# Patient Record
Sex: MALE | Race: WHITE | NOT HISPANIC OR LATINO
[De-identification: names, ages, dates, MRNs, and addresses within clinical notes are randomized per-mention and may not be internally consistent; named-entity substitution may affect disease eponyms.]

---

## 2017-01-25 ENCOUNTER — MEDICATION RENEWAL (OUTPATIENT)
Age: 82
End: 2017-01-25

## 2017-02-15 ENCOUNTER — LABORATORY RESULT (OUTPATIENT)
Age: 82
End: 2017-02-15

## 2017-02-22 ENCOUNTER — APPOINTMENT (OUTPATIENT)
Dept: ENDOCRINOLOGY | Facility: CLINIC | Age: 82
End: 2017-02-22

## 2017-02-22 VITALS
DIASTOLIC BLOOD PRESSURE: 82 MMHG | SYSTOLIC BLOOD PRESSURE: 150 MMHG | HEART RATE: 72 BPM | HEIGHT: 60 IN | BODY MASS INDEX: 30.43 KG/M2 | OXYGEN SATURATION: 95 % | WEIGHT: 155 LBS

## 2017-02-22 RX ORDER — DENOSUMAB 60 MG/ML
60 INJECTION SUBCUTANEOUS
Qty: 1 | Refills: 0 | Status: COMPLETED | OUTPATIENT
Start: 2017-02-22

## 2017-02-22 RX ADMIN — DENOSUMAB 0 MG/ML: 60 INJECTION SUBCUTANEOUS at 00:00

## 2017-08-30 ENCOUNTER — APPOINTMENT (OUTPATIENT)
Dept: ENDOCRINOLOGY | Facility: CLINIC | Age: 82
End: 2017-08-30

## 2017-08-30 ENCOUNTER — APPOINTMENT (OUTPATIENT)
Dept: ENDOCRINOLOGY | Facility: CLINIC | Age: 82
End: 2017-08-30
Payer: COMMERCIAL

## 2017-08-30 VITALS
SYSTOLIC BLOOD PRESSURE: 142 MMHG | DIASTOLIC BLOOD PRESSURE: 64 MMHG | BODY MASS INDEX: 30.63 KG/M2 | HEART RATE: 73 BPM | OXYGEN SATURATION: 93 % | WEIGHT: 156 LBS | HEIGHT: 60 IN

## 2017-08-30 PROCEDURE — 99214 OFFICE O/P EST MOD 30 MIN: CPT | Mod: 25

## 2017-08-30 PROCEDURE — 96401 CHEMO ANTI-NEOPL SQ/IM: CPT

## 2017-08-30 RX ORDER — DENOSUMAB 60 MG/ML
60 INJECTION SUBCUTANEOUS
Qty: 1 | Refills: 0 | Status: COMPLETED | OUTPATIENT
Start: 2017-08-30

## 2017-08-30 RX ORDER — AMLODIPINE BESYLATE AND BENAZEPRIL HYDROCHLORIDE 5; 10 MG/1; MG/1
5-10 CAPSULE ORAL
Refills: 0 | Status: ACTIVE | COMMUNITY

## 2017-08-30 RX ADMIN — DENOSUMAB 0 MG/ML: 60 INJECTION SUBCUTANEOUS at 00:00

## 2017-09-06 ENCOUNTER — APPOINTMENT (OUTPATIENT)
Dept: ENDOCRINOLOGY | Facility: CLINIC | Age: 82
End: 2017-09-06
Payer: COMMERCIAL

## 2017-09-06 VITALS — BODY MASS INDEX: 24.91 KG/M2 | HEIGHT: 66 IN | WEIGHT: 155 LBS

## 2017-09-06 PROCEDURE — 77080 DXA BONE DENSITY AXIAL: CPT

## 2018-03-05 ENCOUNTER — APPOINTMENT (OUTPATIENT)
Dept: ENDOCRINOLOGY | Facility: CLINIC | Age: 83
End: 2018-03-05

## 2018-04-16 ENCOUNTER — INPATIENT (INPATIENT)
Facility: HOSPITAL | Age: 83
LOS: 3 days | Discharge: HOME | DRG: 156 | End: 2018-04-20
Attending: INTERNAL MEDICINE | Admitting: INTERNAL MEDICINE
Payer: COMMERCIAL

## 2018-04-16 VITALS
HEART RATE: 86 BPM | RESPIRATION RATE: 17 BRPM | DIASTOLIC BLOOD PRESSURE: 88 MMHG | TEMPERATURE: 98 F | OXYGEN SATURATION: 98 % | HEIGHT: 68 IN | SYSTOLIC BLOOD PRESSURE: 158 MMHG | WEIGHT: 154.98 LBS

## 2018-04-16 DIAGNOSIS — K12.2 CELLULITIS AND ABSCESS OF MOUTH: ICD-10-CM

## 2018-04-16 LAB
ALBUMIN SERPL ELPH-MCNC: 3.6 G/DL — SIGNIFICANT CHANGE UP (ref 3.3–5)
ALP SERPL-CCNC: 60 U/L — SIGNIFICANT CHANGE UP (ref 40–120)
ALT FLD-CCNC: 8 U/L RC — LOW (ref 10–45)
ANION GAP SERPL CALC-SCNC: 15 MMOL/L — SIGNIFICANT CHANGE UP (ref 5–17)
APTT BLD: 28 SEC — SIGNIFICANT CHANGE UP (ref 27.5–37.4)
AST SERPL-CCNC: 11 U/L — SIGNIFICANT CHANGE UP (ref 10–40)
BASOPHILS # BLD AUTO: 0 K/UL — SIGNIFICANT CHANGE UP (ref 0–0.2)
BASOPHILS NFR BLD AUTO: 0.1 % — SIGNIFICANT CHANGE UP (ref 0–2)
BILIRUB SERPL-MCNC: 0.6 MG/DL — SIGNIFICANT CHANGE UP (ref 0.2–1.2)
BUN SERPL-MCNC: 18 MG/DL — SIGNIFICANT CHANGE UP (ref 7–23)
CALCIUM SERPL-MCNC: 9.4 MG/DL — SIGNIFICANT CHANGE UP (ref 8.4–10.5)
CHLORIDE SERPL-SCNC: 95 MMOL/L — LOW (ref 96–108)
CO2 SERPL-SCNC: 23 MMOL/L — SIGNIFICANT CHANGE UP (ref 22–31)
CREAT SERPL-MCNC: 0.8 MG/DL — SIGNIFICANT CHANGE UP (ref 0.5–1.3)
EOSINOPHIL # BLD AUTO: 0.1 K/UL — SIGNIFICANT CHANGE UP (ref 0–0.5)
EOSINOPHIL NFR BLD AUTO: 0.6 % — SIGNIFICANT CHANGE UP (ref 0–6)
GLUCOSE SERPL-MCNC: 109 MG/DL — HIGH (ref 70–99)
HCT VFR BLD CALC: 32.7 % — LOW (ref 39–50)
HGB BLD-MCNC: 11 G/DL — LOW (ref 13–17)
INR BLD: 1.08 RATIO — SIGNIFICANT CHANGE UP (ref 0.88–1.16)
LYMPHOCYTES # BLD AUTO: 0.8 K/UL — LOW (ref 1–3.3)
LYMPHOCYTES # BLD AUTO: 6.2 % — LOW (ref 13–44)
MCHC RBC-ENTMCNC: 31.3 PG — SIGNIFICANT CHANGE UP (ref 27–34)
MCHC RBC-ENTMCNC: 33.7 GM/DL — SIGNIFICANT CHANGE UP (ref 32–36)
MCV RBC AUTO: 93 FL — SIGNIFICANT CHANGE UP (ref 80–100)
MONOCYTES # BLD AUTO: 1.4 K/UL — HIGH (ref 0–0.9)
MONOCYTES NFR BLD AUTO: 10.5 % — SIGNIFICANT CHANGE UP (ref 2–14)
NEUTROPHILS # BLD AUTO: 11 K/UL — HIGH (ref 1.8–7.4)
NEUTROPHILS NFR BLD AUTO: 82.6 % — HIGH (ref 43–77)
PLATELET # BLD AUTO: 280 K/UL — SIGNIFICANT CHANGE UP (ref 150–400)
POTASSIUM SERPL-MCNC: 4.2 MMOL/L — SIGNIFICANT CHANGE UP (ref 3.5–5.3)
POTASSIUM SERPL-SCNC: 4.2 MMOL/L — SIGNIFICANT CHANGE UP (ref 3.5–5.3)
PROT SERPL-MCNC: 6.5 G/DL — SIGNIFICANT CHANGE UP (ref 6–8.3)
PROTHROM AB SERPL-ACNC: 11.8 SEC — SIGNIFICANT CHANGE UP (ref 9.8–12.7)
RBC # BLD: 3.51 M/UL — LOW (ref 4.2–5.8)
RBC # FLD: 11.2 % — SIGNIFICANT CHANGE UP (ref 10.3–14.5)
SODIUM SERPL-SCNC: 133 MMOL/L — LOW (ref 135–145)
WBC # BLD: 13.4 K/UL — HIGH (ref 3.8–10.5)
WBC # FLD AUTO: 13.4 K/UL — HIGH (ref 3.8–10.5)

## 2018-04-16 PROCEDURE — 99220: CPT

## 2018-04-16 PROCEDURE — 93010 ELECTROCARDIOGRAM REPORT: CPT

## 2018-04-16 RX ORDER — SODIUM CHLORIDE 9 MG/ML
3 INJECTION INTRAMUSCULAR; INTRAVENOUS; SUBCUTANEOUS EVERY 8 HOURS
Qty: 0 | Refills: 0 | Status: DISCONTINUED | OUTPATIENT
Start: 2018-04-16 | End: 2018-04-20

## 2018-04-16 RX ORDER — AMLODIPINE BESYLATE 2.5 MG/1
5 TABLET ORAL DAILY
Qty: 0 | Refills: 0 | Status: DISCONTINUED | OUTPATIENT
Start: 2018-04-16 | End: 2018-04-20

## 2018-04-16 RX ORDER — SODIUM CHLORIDE 9 MG/ML
1000 INJECTION INTRAMUSCULAR; INTRAVENOUS; SUBCUTANEOUS
Qty: 0 | Refills: 0 | Status: DISCONTINUED | OUTPATIENT
Start: 2018-04-16 | End: 2018-04-20

## 2018-04-16 RX ORDER — ATORVASTATIN CALCIUM 80 MG/1
20 TABLET, FILM COATED ORAL AT BEDTIME
Qty: 0 | Refills: 0 | Status: DISCONTINUED | OUTPATIENT
Start: 2018-04-16 | End: 2018-04-20

## 2018-04-16 RX ADMIN — ATORVASTATIN CALCIUM 20 MILLIGRAM(S): 80 TABLET, FILM COATED ORAL at 23:04

## 2018-04-16 RX ADMIN — SODIUM CHLORIDE 100 MILLILITER(S): 9 INJECTION INTRAMUSCULAR; INTRAVENOUS; SUBCUTANEOUS at 23:05

## 2018-04-16 RX ADMIN — AMLODIPINE BESYLATE 5 MILLIGRAM(S): 2.5 TABLET ORAL at 23:05

## 2018-04-16 RX ADMIN — Medication 100 MILLIGRAM(S): at 18:46

## 2018-04-16 NOTE — ED PROVIDER NOTE - PROGRESS NOTE DETAILS
ENT consulted, PAs saw pt, waiting for attending, Dr. Roberson for recs. ENT performed I&D at bedside, recommended clinda, medicine admission, will follow.

## 2018-04-16 NOTE — CONSULT NOTE ADULT - ATTENDING COMMENTS
Thick purulent foul smelling abscess contents drained from right SMG. Sent for culture. Packed with gauze. Pt failed outpt treatment with augmentin so recommend clindamycin. Likely can d/c tomorrow if clinically improved.

## 2018-04-16 NOTE — ED CDU PROVIDER INITIAL DAY NOTE - OBJECTIVE STATEMENT
88y/o M with PMH prostate cancer, HLD, HTN, and osteoporosis p/w L. mandibular swelling x 3 wks. Area is painful, swollen and red. pt was diagnosed with sialodenitis 3 weeks ago, completed course of augmentin about 1.5wks ago. pts symptoms were improving while on the antibiotic, then a week after completing it, symptoms worsened. + subjective fevers, pain with swallowing. pt went to his ENT again today, had flexible laryngoscope which showed clear airway, and was informed to go to the ED for IV antibiotics. denies SOB, dyspnea, inability to swallowing, drooling, N/V, CP, neck pain, problems walking/going to the bathroom.   In ED, ENT saw pt and performed I&D. pt states he feels much better since having the I&D. pt started on clinda. pt sent to CDU for continued IV antibioitics.      PMD: Dr. Geovani Puri  ENT: Dr. Philip Perlman 88y/o M with PMH prostate cancer, HLD, HTN, and osteoporosis p/w L. mandibular swelling x 3 wks. Area is painful, swollen and red. pt was diagnosed with sialodenitis 3 weeks ago, completed course of augmentin about 1.5wks ago. pts symptoms were improving while on the antibiotic, then a week after completing it, symptoms worsened. + subjective fevers, pain with swallowing. pt went to his ENT again today, had flexible laryngoscope which showed clear airway, and was informed to go to the ED for IV antibiotics. denies SOB, dyspnea, inability to swallowing, drooling, N/V, CP, neck pain, problems walking/going to the bathroom.   In ED, CT shows L sialodenitis with abscess, ENT saw pt and performed I&D. pt states he feels much better since having the I&D. pt started on clinda. pt sent to CDU for continued IV antibiotics.      PMD: Dr. Geovani Puri  ENT: Dr. Philip Perlman

## 2018-04-16 NOTE — ED PROVIDER NOTE - PHYSICAL EXAMINATION
Gen: NAD  Eyes:  sclerae white, no icterus  HEENT swelling under L. mandible 2x3cm, indurated, erythematous, tender  Neck: supple, no LAD, mass or goiter, trachea midline  CV: RRR. Audible S1 and S2. No murmurs, rubs, gallops, S3, nor S4  Pulm: Clear to auscultation bilaterally. No wheezes, rales, or rhonchi  Abd: BS+, nondistended, No tenderness to palpation  Psych: mood good, affect full range and congruent with mood.  Neurologic: AAOx3

## 2018-04-16 NOTE — ED CDU PROVIDER INITIAL DAY NOTE - ATTENDING CONTRIBUTION TO CARE
Attending Note (Jamal): patient with rle cellulitis.  erythema, warmth.  recently treated with antibiotics, failed.  labs, antibiotics, cdu. 88 yo male with large L sided neck mass as noted, evaluated by ENT and drained at the bedside, now dispositioned to CDU for observation and further management.  see ED note and ENT consult note from this same visit for further details.

## 2018-04-16 NOTE — CONSULT NOTE ADULT - PROBLEM SELECTOR RECOMMENDATION 9
IV ABX & pain control prn  Sialogogues  Possible I&D of abscess  Dr Roberson to see Medicine Admission for observation IV ABX & pain control prn  Sialogogues, warm compresses  Local wound checks/care per routine  Will follow

## 2018-04-16 NOTE — ED CDU PROVIDER DISPOSITION NOTE - CLINICAL COURSE
88y/o M with PMH prostate cancer, HLD, HTN, and osteoporosis p/w L. mandibular swelling x 3 wks. Area is painful, swollen and red. pt was diagnosed with sialodenitis 3 weeks ago, completed course of augmentin about 1.5wks ago. pts symptoms were improving while on the antibiotic, then a week after completing it, symptoms worsened. + subjective fevers, pain with swallowing. pt went to his ENT again today, had flexible laryngoscope which showed clear airway, and was informed to go to the ED for IV antibiotics.   In ED, CT shows L sialodenitis with abscess, ENT saw pt and performed I&D. pt states he feels much better since having the I&D. pt started on clinda. pt sent to CDU for continued IV antibiotics. 89y/o M with PMH prostate cancer, HLD, HTN, and osteoporosis p/w L. mandibular swelling x 3 wks. Area is painful, swollen and red. pt was diagnosed with sialodenitis 3 weeks ago, completed course of augmentin about 1.5wks ago. pts symptoms were improving while on the antibiotic, then a week after completing it, symptoms worsened. + subjective fevers, pain with swallowing. pt went to his ENT again today, had flexible laryngoscope which showed clear airway, and was informed to go to the ED for IV antibiotics.   In ED, CT shows L sialodenitis with abscess, ENT saw pt and performed I&D. pt states he feels much better since having the I&D. pt started on clinda. pt sent to CDU for continued IV antibiotics. Patient felt well in the morning and was afebrile overnight. ENT came to see the patient in AM and changed the packing to assess and after further observation patient was evaluated by ENT attending Dr. Roberson who recommended medicine admission for continued IV abx given wound continuously draining. Dr. Rudolph was called and accepted the patient under his service. Case discussed with ED attending and patient made aware.

## 2018-04-16 NOTE — ED CDU PROVIDER DISPOSITION NOTE - ATTENDING CONTRIBUTION TO CARE
ED attending Dr Marck Escudero note:  Patient re-evaluated and doing well.  No acute issues at  this time.  Lab and radiology tests reviewed with patient and/or family.  Patient stable for discharge.  I have personally performed a face to face diagnostic evaluation on this patient.  I have reviewed the ACP note and agree with the history, exam, and plan of care, except as noted.  History and Exam by me showed improvement of sts, seen by ENT and repacked abscess for d.c today with abx and ent follow up.

## 2018-04-16 NOTE — ED ADULT NURSE NOTE - OBJECTIVE STATEMENT
1235 89 yr old WM brought to ER by daughter for further eval and tx of increasing redness to left side of face and voice slightly hoarse. s/p "infected salivary gland" 3/28/2018. Was on augmentin at that time. finished 2 wks ago. Evaluated today by ENT and sent to ER for work-up to r/o abscess. Denies fever or chills. A&Ox3. Hx through daughter. Fall risk precautions maintained. Yellow band intact, siderails up, red socks given. wearing diaper. minimal pain at present2/10. erythema noted at left side of face and left ear. No stridor or drooling. Voice mildly hoarse.

## 2018-04-16 NOTE — ED ADULT TRIAGE NOTE - CHIEF COMPLAINT QUOTE
Patient sent by Dr. Perlman (ENT) for "clogged left salivary gland" +swelling and redness x 2 weeks. Patient was on augmentin, minimal improvement.

## 2018-04-16 NOTE — ED CDU PROVIDER INITIAL DAY NOTE - FAMILY HISTORY
Sibling  Still living? Unknown  Family history of breast cancer in male, Age at diagnosis: Age Unknown  Family history of prostate cancer, Age at diagnosis: Age Unknown

## 2018-04-16 NOTE — CONSULT NOTE ADULT - SUBJECTIVE AND OBJECTIVE BOX
CC: Left mandibular swelling    HPI: Patient is a 89y old male with HTN, HLD, hx prostate ca, sent in by Dr. Perlman (ENT) for worsening left submandibular swelling. Pt with increasing pain, subjective fevers, after course of abx for suspecting sialoadenitis. Out pt FOE/laryngoscopy done out pt and revealed patent airway.    PAST MEDICAL & SURGICAL HISTORY:  HLD (hyperlipidemia)  Hypertension  History of Prostate Cancer  H/O arthroplasty: Left shoulder.  S/P Prostatectomy  S/P Appendectomy    Allergies    No Known Allergies    Intolerances      MEDICATIONS  (STANDING):    MEDICATIONS  (PRN):    Social History: ????????    ROS: ENT, GI, , CV, Pulm, Neuro, Psych, MS, Heme, Endo, Constitutional; all negative except as noted in HPI    Vital Signs Last 24 Hrs  T(C): 37.1 (16 Apr 2018 15:03), Max: 37.1 (16 Apr 2018 15:03)  T(F): 98.7 (16 Apr 2018 15:03), Max: 98.7 (16 Apr 2018 15:03)  HR: 96 (16 Apr 2018 15:03) (82 - 96)  BP: 155/55 (16 Apr 2018 15:03) (147/80 - 158/88)  BP(mean): --  RR: 19 (16 Apr 2018 15:03) (17 - 20)  SpO2: 98% (16 Apr 2018 15:03) (98% - 99%)                          11.0   13.4  )-----------( 280      ( 16 Apr 2018 14:00 )             32.7    04-16    133<L>  |  95<L>  |  18  ----------------------------<  109<H>  4.2   |  23  |  0.80    Ca    9.4      16 Apr 2018 14:00    TPro  6.5  /  Alb  3.6  /  TBili  0.6  /  DBili  x   /  AST  11  /  ALT  8<L>  /  AlkPhos  60  04-16   PT/INR - ( 16 Apr 2018 14:00 )   PT: 11.8 sec;   INR: 1.08 ratio         PTT - ( 16 Apr 2018 14:00 )  PTT:28.0 sec    PHYSICAL EXAM:  Gen: NAD, well-developed  Head: Normocephalic, Atraumatic  Face: no edema/erythema/fluctuance, parotid glands soft without mass  Eyes: PERRL, EOMI, no scleral injection  Ears: Right - ear canal clear, TM intact without effusion            Left - ear canal clear, TM intact without effusion  Nose: Nares bilaterally patent, no discharge  Mouth: Mucosa moist, tongue/uvula midline, oropharynx clear  Neck: Flat, supple, no lymphadenopathy, trachea midline, no masses  Resp: no use of accessory muscles, no stridor  CV: no peripheral edema/cyanosis CC: Left mandibular swelling x 2 weeks        HPI: Patient is a 89y old male with HTN, HLD, hx prostate ca, sent in by Dr. Perlman ( Primary ENT) for progressive Left submandibular swelling, and pain a/w difficulty swallowing, pt reports discomfort while sleeping increasing Left neck pain. Pt was being treated with Augmentin PO, with no resolution of symptoms. Pt seen & evaluated  by Dr Perlman this AM, advised to come to ED for further evaluation Imaging ( Neck CT to r/o Abscess), and IV ABX     Laryngoscopy by Dr Perlman today within normal limits, no airway edema or involvement. Pt denies dysphagia/f/c/voice change/drooling/recent travel/weight loss/neck pain/      EXAM: CT NECK SOFT TISSUE IC     PROCEDURE DATE: 04/16/2018     INTERPRETATION: Contrast-enhanced CT of the neck     CLINICAL INDICATION: Left neck swelling. Pain.     TECHNIQUE: Axial CT scanning of the neck was obtained after the intravenous   administration of 90 cc of Omnipaque 300 (10 cc were discarded) from skull   base to the clavicles. Sagittal and coronal reformats were provided.     COMPARISON: None available     FINDINGS:     There is a 3.4 x 3.2 x 2.9 cm (anterior-posterior by transverse by   craniocaudad) rim-enhancing fluid collection involving the inferior aspect   of the left submandibular gland, consistent with abscess. Thin internal   septations are present. Left submandibular sialoliths are noted. There is   infiltration of the surrounding soft tissues.     Right submandibular gland, bilateral parotid glands, and visualized thyroid   gland demonstrate no focal lesion.     Soft tissues surrounding the nasopharynx, oropharynx, hypopharynx, and   larynx are unremarkable.     No lymphadenopathy.     Visualized lung apices clear. No lytic or destructive osseous lesion.     Moderate compression deformity of T3 which appears chronic. Milder   compression deformity of other     IMPRESSION:     Findings consistent with left submandibular gland sialadenitis complicated   by the presence of a 3.4 x 3.2 x 2.9 cm left submandibular abscess.     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 Clinical   correlation recommended.         PAST MEDICAL & SURGICAL HISTORY:  HLD (hyperlipidemia)  Hypertension  History of Prostate Cancer  H/O arthroplasty: Left shoulder.  S/P Prostatectomy  S/P Appendectomy    Allergies    No Known Allergies    Intolerances      MEDICATIONS  (STANDING):    MEDICATIONS  (PRN):    Social History: denies ETOH/Tobacco/Recreational Drug use    ROS: ENT, GI, , CV, Pulm, Neuro, Psych, MS, Heme, Endo, Constitutional; all negative except as noted in HPI    Vital Signs Last 24 Hrs  T(C): 37.1 (16 Apr 2018 15:03), Max: 37.1 (16 Apr 2018 15:03)  T(F): 98.7 (16 Apr 2018 15:03), Max: 98.7 (16 Apr 2018 15:03)  HR: 96 (16 Apr 2018 15:03) (82 - 96)  BP: 155/55 (16 Apr 2018 15:03) (147/80 - 158/88)  RR: 19 (16 Apr 2018 15:03) (17 - 20)  SpO2: 98% (16 Apr 2018 15:03) (98% - 99%)                          11.0   13.4  )-----------( 280      ( 16 Apr 2018 14:00 )             32.7    04-16    133<L>  |  95<L>  |  18  ----------------------------<  109<H>  4.2   |  23  |  0.80    Ca    9.4      16 Apr 2018 14:00    TPro  6.5  /  Alb  3.6  /  TBili  0.6  /  DBili  x   /  AST  11  /  ALT  8<L>  /  AlkPhos  60  04-16   PT/INR - ( 16 Apr 2018 14:00 )   PT: 11.8 sec;   INR: 1.08 ratio         PTT - ( 16 Apr 2018 14:00 )  PTT:28.0 sec    PHYSICAL EXAM:  Gen: NAD, well-developed speaking full sentences no stridor  Head: Normocephalic, Atraumatic  Face: no edema/erythema/fluctuance, parotid glands soft without mass   Eyes: no scleral injection  Ears: Right - ear canal clear, TM intact without effusion            Left - ear canal clear, TM intact without effusion  Nose: Nares bilaterally patent, no discharge  Mouth: Mucosa moist, tongue midline FOM soft uvula midline, oropharynx clear symmterical tonsillar fossa no pooling of secretions  Neck:  +Left Submandibular swelling with overlying erythema, induration, and fluctuance, +TTP no submental edema, neck landmarks visible otherwise neck Flat, supple, no lymphadenopathy, trachea midline, no masses  Resp: no use of accessory muscles, no stridor    Laryngoscopy Scope 4  Nasopharynx, oropharynx, and hypopharynx clear, no bleeding. Airway patent, no foreign body visualized. No erythema, edema, pooling of secretions, masses or lesions. No glottic/supraglottic edema. Vocal cords mobile with good contact b/l. bilateral nasal cavities were thoroughly inspected. The scope was advanced on the floor of the nose to the nasopharynx, it was fully inspected. It was then passed between the middle and the inferior turbinates, followed by exam of the olfactory cleft to look for any polyps. The patient was seen to have no bilateral turbinate hypertrophy and no nasal septal deviation. Examination of the middle and superior meatus, sphenoethmoid recess were seen to be normal.  No bleeding in Oral pharynx.  No foreign body or pooling of secretions.  No glottic / supraglottic swelling.  Vocal cords mobile in intact b/l.  Airway patent. CC: Left mandibular swelling x 2 weeks        HPI: Patient is a 89y old male with HTN, HLD, hx prostate ca, sent in by Dr. Perlman ( Primary ENT) for progressive Left submandibular swelling, and pain a/w difficulty swallowing, pt reports discomfort while sleeping increasing Left neck pain. Pt was being treated with Augmentin PO, with no resolution of symptoms. Pt seen & evaluated  by Dr Perlman this AM, advised to come to ED for further evaluation Imaging ( Neck CT to r/o Abscess), and IV ABX     Laryngoscopy by Dr Perlman today within normal limits, no airway edema or involvement. Pt denies dysphagia/f/c/voice change/drooling/recent travel/weight loss/neck pain/      EXAM: CT NECK SOFT TISSUE IC     PROCEDURE DATE: 04/16/2018     INTERPRETATION: Contrast-enhanced CT of the neck     CLINICAL INDICATION: Left neck swelling. Pain.     TECHNIQUE: Axial CT scanning of the neck was obtained after the intravenous   administration of 90 cc of Omnipaque 300 (10 cc were discarded) from skull   base to the clavicles. Sagittal and coronal reformats were provided.     COMPARISON: None available     FINDINGS:     There is a 3.4 x 3.2 x 2.9 cm (anterior-posterior by transverse by   craniocaudad) rim-enhancing fluid collection involving the inferior aspect   of the left submandibular gland, consistent with abscess. Thin internal   septations are present. Left submandibular sialoliths are noted. There is   infiltration of the surrounding soft tissues.     Right submandibular gland, bilateral parotid glands, and visualized thyroid   gland demonstrate no focal lesion.     Soft tissues surrounding the nasopharynx, oropharynx, hypopharynx, and   larynx are unremarkable.     No lymphadenopathy.     Visualized lung apices clear. No lytic or destructive osseous lesion.     Moderate compression deformity of T3 which appears chronic. Milder   compression deformity of other     IMPRESSION:     Findings consistent with left submandibular gland sialadenitis complicated   by the presence of a 3.4 x 3.2 x 2.9 cm left submandibular abscess.     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 Clinical   correlation recommended.         PAST MEDICAL & SURGICAL HISTORY:  HLD (hyperlipidemia)  Hypertension  History of Prostate Cancer  H/O arthroplasty: Left shoulder.  S/P Prostatectomy  S/P Appendectomy    Allergies    No Known Allergies    Intolerances      MEDICATIONS  (STANDING):    MEDICATIONS  (PRN):    Social History: denies ETOH/Tobacco/Recreational Drug use    ROS: ENT, GI, , CV, Pulm, Neuro, Psych, MS, Heme, Endo, Constitutional; all negative except as noted in HPI    Vital Signs Last 24 Hrs  T(C): 37.1 (16 Apr 2018 15:03), Max: 37.1 (16 Apr 2018 15:03)  T(F): 98.7 (16 Apr 2018 15:03), Max: 98.7 (16 Apr 2018 15:03)  HR: 96 (16 Apr 2018 15:03) (82 - 96)  BP: 155/55 (16 Apr 2018 15:03) (147/80 - 158/88)  RR: 19 (16 Apr 2018 15:03) (17 - 20)  SpO2: 98% (16 Apr 2018 15:03) (98% - 99%)                          11.0   13.4  )-----------( 280      ( 16 Apr 2018 14:00 )             32.7    04-16    133<L>  |  95<L>  |  18  ----------------------------<  109<H>  4.2   |  23  |  0.80    Ca    9.4      16 Apr 2018 14:00    TPro  6.5  /  Alb  3.6  /  TBili  0.6  /  DBili  x   /  AST  11  /  ALT  8<L>  /  AlkPhos  60  04-16   PT/INR - ( 16 Apr 2018 14:00 )   PT: 11.8 sec;   INR: 1.08 ratio         PTT - ( 16 Apr 2018 14:00 )  PTT:28.0 sec    PHYSICAL EXAM:  Gen: NAD, well-developed speaking full sentences no stridor  Head: Normocephalic, Atraumatic  Face: no edema/erythema/fluctuance, parotid glands soft without mass   Eyes: no scleral injection  Ears: Right - ear canal clear, TM intact without effusion            Left - ear canal clear, TM intact without effusion  Nose: Nares bilaterally patent, no discharge  Mouth: Mucosa moist, tongue midline FOM soft uvula midline, oropharynx clear symmterical tonsillar fossa no pooling of secretions  Neck:  +Left Submandibular swelling with overlying erythema, induration, and fluctuance, +TTP no submental edema, neck landmarks visible otherwise neck Flat, supple, no lymphadenopathy, trachea midline, no masses  Resp: no use of accessory muscles, no stridor    Laryngoscopy Scope 4  Nasopharynx, oropharynx, and hypopharynx clear, no bleeding. Airway patent, no foreign body visualized. No erythema, edema, pooling of secretions, masses or lesions. No glottic/supraglottic edema. Vocal cords mobile with good contact b/l. bilateral nasal cavities were thoroughly inspected. The scope was advanced on the floor of the nose to the nasopharynx, it was fully inspected. It was then passed between the middle and the inferior turbinates, followed by exam of the olfactory cleft to look for any polyps. The patient was seen to have no bilateral turbinate hypertrophy and no nasal septal deviation. Examination of the middle and superior meatus, sphenoethmoid recess were seen to be normal.  No bleeding in Oral pharynx.  No foreign body or pooling of secretions.  No glottic / supraglottic swelling.  Vocal cords mobile in intact b/l.  Airway patent.        PROCEDURE NOTE - Incision and Drainage of  Left Submandibular Abscess    Indication: Left Submandibular Abscess  	  Consent obtained, correct patient, procedure, site, positioning, and presence of correct supplies.    Procedure: Patient was positioned, prepped and draped in usual sterile fashion. Approximately 5cc's of 1% lidocaine w/ Epi was used to anesthetize the Left Submandibular region #11 blade scalpel was used to make a 1.5 cm incision across the body of the abscess. Clamp inserted and maneuvered to break up  any loculations Manual pressure was used to remove the contents of the abscess cavity. Cultures of the drainage were sent. Following evacuation of the abscess cavity, it was rinsed thoroughly with sterile normal saline. The abscess cavity was packed with 1/2 inch iodoform pressure dressing as applied to ensure hemostasis.  Blood loss: Minimal  Complications: None CC: Left mandibular swelling x 2 weeks        HPI: Patient is a 89y old male with HTN, HLD, hx prostate ca, sent in by Dr. Perlman ( Primary ENT) for progressive Left submandibular swelling, and pain a/w difficulty swallowing, pt reports discomfort while sleeping increasing Left neck pain. Pt was being treated with Augmentin PO, with no resolution of symptoms. Pt seen & evaluated  by Dr Perlman this AM, advised to come to ED for further evaluation Imaging ( Neck CT to r/o Abscess), and IV ABX     Laryngoscopy by Dr Perlman today within normal limits, no airway edema or involvement. Pt denies dysphagia/f/c/voice change/drooling/recent travel/weight loss/neck pain/      EXAM: CT NECK SOFT TISSUE IC     PROCEDURE DATE: 04/16/2018     INTERPRETATION: Contrast-enhanced CT of the neck     CLINICAL INDICATION: Left neck swelling. Pain.     TECHNIQUE: Axial CT scanning of the neck was obtained after the intravenous   administration of 90 cc of Omnipaque 300 (10 cc were discarded) from skull   base to the clavicles. Sagittal and coronal reformats were provided.     COMPARISON: None available     FINDINGS:     There is a 3.4 x 3.2 x 2.9 cm (anterior-posterior by transverse by   craniocaudad) rim-enhancing fluid collection involving the inferior aspect   of the left submandibular gland, consistent with abscess. Thin internal   septations are present. Left submandibular sialoliths are noted. There is   infiltration of the surrounding soft tissues.     Right submandibular gland, bilateral parotid glands, and visualized thyroid   gland demonstrate no focal lesion.     Soft tissues surrounding the nasopharynx, oropharynx, hypopharynx, and   larynx are unremarkable.     No lymphadenopathy.     Visualized lung apices clear. No lytic or destructive osseous lesion.     Moderate compression deformity of T3 which appears chronic. Milder   compression deformity of other     IMPRESSION:     Findings consistent with left submandibular gland sialadenitis complicated   by the presence of a 3.4 x 3.2 x 2.9 cm left submandibular abscess.     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 Clinical   correlation recommended.         PAST MEDICAL & SURGICAL HISTORY:  HLD (hyperlipidemia)  Hypertension  History of Prostate Cancer  H/O arthroplasty: Left shoulder.  S/P Prostatectomy  S/P Appendectomy    Allergies    No Known Allergies    Intolerances      MEDICATIONS  (STANDING):    MEDICATIONS  (PRN):    Social History: denies ETOH/Tobacco/Recreational Drug use    ROS: ENT, GI, , CV, Pulm, Neuro, Psych, MS, Heme, Endo, Constitutional; all negative except as noted in HPI    Vital Signs Last 24 Hrs  T(C): 37.1 (16 Apr 2018 15:03), Max: 37.1 (16 Apr 2018 15:03)  T(F): 98.7 (16 Apr 2018 15:03), Max: 98.7 (16 Apr 2018 15:03)  HR: 96 (16 Apr 2018 15:03) (82 - 96)  BP: 155/55 (16 Apr 2018 15:03) (147/80 - 158/88)  RR: 19 (16 Apr 2018 15:03) (17 - 20)  SpO2: 98% (16 Apr 2018 15:03) (98% - 99%)                          11.0   13.4  )-----------( 280      ( 16 Apr 2018 14:00 )             32.7    04-16    133<L>  |  95<L>  |  18  ----------------------------<  109<H>  4.2   |  23  |  0.80    Ca    9.4      16 Apr 2018 14:00    TPro  6.5  /  Alb  3.6  /  TBili  0.6  /  DBili  x   /  AST  11  /  ALT  8<L>  /  AlkPhos  60  04-16   PT/INR - ( 16 Apr 2018 14:00 )   PT: 11.8 sec;   INR: 1.08 ratio         PTT - ( 16 Apr 2018 14:00 )  PTT:28.0 sec    PHYSICAL EXAM:  Gen: NAD, well-developed speaking full sentences no stridor  Head: Normocephalic, Atraumatic  Face: no edema/erythema/fluctuance, parotid glands soft without mass   Eyes: no scleral injection  Ears: Right - ear canal clear, TM intact without effusion            Left - ear canal clear, TM intact without effusion  Nose: Nares bilaterally patent, no discharge  Mouth: Mucosa moist, tongue midline FOM soft uvula midline, oropharynx clear symmterical tonsillar fossa no pooling of secretions  Neck:  +Left Submandibular swelling with overlying erythema, induration, and fluctuance, +TTP no submental edema, neck landmarks visible otherwise neck Flat, supple, no lymphadenopathy, trachea midline, no masses  Resp: no use of accessory muscles, no stridor    Laryngoscopy Scope 4  Nasopharynx, oropharynx, and hypopharynx clear, no bleeding. Airway patent, no foreign body visualized. No erythema, edema, pooling of secretions, masses or lesions. Vocal cords mobile with good contact b/l.   No foreign body or pooling of secretions.  No glottic / supraglottic swelling.  Airway patent.        PROCEDURE NOTE - Incision and Drainage of  Left Submandibular Abscess    Indication: Left Submandibular Abscess  	  Consent obtained, correct patient, procedure, site, positioning, and presence of correct supplies.    Procedure: Patient was positioned, prepped and draped in usual sterile fashion. Approximately 5cc's of 1% lidocaine w/ Epi was used to anesthetize the Left Submandibular region #11 blade scalpel was used to make a 1.5 cm incision across the body of the abscess. Clamp inserted and maneuvered to break up  any loculations Manual pressure was used to remove the contents of the abscess cavity. Cultures of the drainage were sent. Following evacuation of the abscess cavity, it was rinsed thoroughly with sterile normal saline. The abscess cavity was packed with 1/2 inch iodoform pressure dressing as applied to ensure hemostasis.  Blood loss: Minimal  Complications: None

## 2018-04-16 NOTE — ED CDU PROVIDER DISPOSITION NOTE - PLAN OF CARE
1. Take Clindamycin 300mg every 6 hours for 1 week. Stay hydrated.  2.  Keep covered and dry.  Return to ED in 1-2 days for wound recheck and packing change.   3. Any increased pain, redness, fever, chills return to ED.  4. Follow up with your PMD within 1 week.

## 2018-04-16 NOTE — ED PROVIDER NOTE - MEDICAL DECISION MAKING DETAILS
Attending Note (Jamal): patient with rle cellulitis.  erythema, warmth.  recently treated with antibiotics, failed.  labs, antibiotics, cdu. Attending Note (Jamal): patient with neck swelling, sent in by ent.  unclear etiology.  ct neck, ent consult.

## 2018-04-16 NOTE — CONSULT NOTE ADULT - ASSESSMENT
89yoM with progressive Left Submandibular Swelling and pain, with no improvement on Oral ABX,  Neck CT reveals a 3.4 x 3.2 x 2.9 cm (anterior-posterior by transverse by   craniocaudad) rim-enhancing fluid collection involving the inferior aspect of the left submandibular gland, consistent with abscess.  Laryngoscopy reveals no airway involvement 89yoM with progressive Left Submandibular Swelling and pain, with no improvement on Oral ABX,  Neck CT reveals a 3.4 x 3.2 x 2.9 cm (anterior-posterior by transverse by craniocaudad) rim-enhancing fluid collection involving the inferior aspect of the left submandibular gland, consistent with abscess.  Laryngoscopy reveals no airway involvement Pt is s/p Bedside Incision and Drainage of Abscess Cultures obtained

## 2018-04-16 NOTE — ED PROVIDER NOTE - OBJECTIVE STATEMENT
Hx HTN, hyperchol, p/w L. mandibular swelling, worsening x 3 wks, painful, sent in by ENT - flexible laryngoscope showed clear airway, initially thought to be sialdenitis, completed course of augmentin. + subjective fever. Hx HTN, hyperchol, p/w L. mandibular swelling, worsening x 3 wks, painful, sent in by ENT - flexible laryngoscope showed clear airway, initially thought to be sialdenitis, completed course of augmentin. + subjective fever.  primary care physician: Geovani Puri  ENT: Philip Perlman

## 2018-04-17 DIAGNOSIS — K12.2 CELLULITIS AND ABSCESS OF MOUTH: ICD-10-CM

## 2018-04-17 LAB
ANION GAP SERPL CALC-SCNC: 14 MMOL/L — SIGNIFICANT CHANGE UP (ref 5–17)
BASOPHILS # BLD AUTO: 0 K/UL — SIGNIFICANT CHANGE UP (ref 0–0.2)
BASOPHILS NFR BLD AUTO: 0.3 % — SIGNIFICANT CHANGE UP (ref 0–2)
BUN SERPL-MCNC: 14 MG/DL — SIGNIFICANT CHANGE UP (ref 7–23)
CALCIUM SERPL-MCNC: 8.7 MG/DL — SIGNIFICANT CHANGE UP (ref 8.4–10.5)
CHLORIDE SERPL-SCNC: 95 MMOL/L — LOW (ref 96–108)
CO2 SERPL-SCNC: 22 MMOL/L — SIGNIFICANT CHANGE UP (ref 22–31)
CREAT SERPL-MCNC: 0.79 MG/DL — SIGNIFICANT CHANGE UP (ref 0.5–1.3)
EOSINOPHIL # BLD AUTO: 0.1 K/UL — SIGNIFICANT CHANGE UP (ref 0–0.5)
EOSINOPHIL NFR BLD AUTO: 1 % — SIGNIFICANT CHANGE UP (ref 0–6)
GLUCOSE SERPL-MCNC: 108 MG/DL — HIGH (ref 70–99)
HCT VFR BLD CALC: 30.2 % — LOW (ref 39–50)
HGB BLD-MCNC: 10.2 G/DL — LOW (ref 13–17)
LYMPHOCYTES # BLD AUTO: 1 K/UL — SIGNIFICANT CHANGE UP (ref 1–3.3)
LYMPHOCYTES # BLD AUTO: 7.7 % — LOW (ref 13–44)
MCHC RBC-ENTMCNC: 30.8 PG — SIGNIFICANT CHANGE UP (ref 27–34)
MCHC RBC-ENTMCNC: 33.8 GM/DL — SIGNIFICANT CHANGE UP (ref 32–36)
MCV RBC AUTO: 91.1 FL — SIGNIFICANT CHANGE UP (ref 80–100)
MONOCYTES # BLD AUTO: 1.6 K/UL — HIGH (ref 0–0.9)
MONOCYTES NFR BLD AUTO: 13.2 % — SIGNIFICANT CHANGE UP (ref 2–14)
NEUTROPHILS # BLD AUTO: 9.7 K/UL — HIGH (ref 1.8–7.4)
NEUTROPHILS NFR BLD AUTO: 77.9 % — HIGH (ref 43–77)
PLATELET # BLD AUTO: 279 K/UL — SIGNIFICANT CHANGE UP (ref 150–400)
POTASSIUM SERPL-MCNC: 4.1 MMOL/L — SIGNIFICANT CHANGE UP (ref 3.5–5.3)
POTASSIUM SERPL-SCNC: 4.1 MMOL/L — SIGNIFICANT CHANGE UP (ref 3.5–5.3)
RBC # BLD: 3.32 M/UL — LOW (ref 4.2–5.8)
RBC # FLD: 11.2 % — SIGNIFICANT CHANGE UP (ref 10.3–14.5)
SODIUM SERPL-SCNC: 131 MMOL/L — LOW (ref 135–145)
WBC # BLD: 12.5 K/UL — HIGH (ref 3.8–10.5)
WBC # FLD AUTO: 12.5 K/UL — HIGH (ref 3.8–10.5)

## 2018-04-17 PROCEDURE — 99024 POSTOP FOLLOW-UP VISIT: CPT

## 2018-04-17 PROCEDURE — 99217: CPT

## 2018-04-17 RX ORDER — VANCOMYCIN HCL 1 G
1000 VIAL (EA) INTRAVENOUS EVERY 24 HOURS
Qty: 0 | Refills: 0 | Status: DISCONTINUED | OUTPATIENT
Start: 2018-04-17 | End: 2018-04-20

## 2018-04-17 RX ADMIN — Medication 100 MILLIGRAM(S): at 21:07

## 2018-04-17 RX ADMIN — SODIUM CHLORIDE 3 MILLILITER(S): 9 INJECTION INTRAMUSCULAR; INTRAVENOUS; SUBCUTANEOUS at 21:05

## 2018-04-17 RX ADMIN — Medication 250 MILLIGRAM(S): at 18:14

## 2018-04-17 RX ADMIN — SODIUM CHLORIDE 3 MILLILITER(S): 9 INJECTION INTRAMUSCULAR; INTRAVENOUS; SUBCUTANEOUS at 16:39

## 2018-04-17 RX ADMIN — ATORVASTATIN CALCIUM 20 MILLIGRAM(S): 80 TABLET, FILM COATED ORAL at 21:07

## 2018-04-17 RX ADMIN — SODIUM CHLORIDE 100 MILLILITER(S): 9 INJECTION INTRAMUSCULAR; INTRAVENOUS; SUBCUTANEOUS at 18:14

## 2018-04-17 RX ADMIN — Medication 100 MILLIGRAM(S): at 10:56

## 2018-04-17 RX ADMIN — SODIUM CHLORIDE 3 MILLILITER(S): 9 INJECTION INTRAMUSCULAR; INTRAVENOUS; SUBCUTANEOUS at 07:01

## 2018-04-17 RX ADMIN — Medication 100 MILLIGRAM(S): at 03:07

## 2018-04-17 NOTE — H&P ADULT - HISTORY OF PRESENT ILLNESS
88y/o M with PMH prostate cancer, HLD, HTN, and osteoporosis p/w L. mandibular swelling x 3 wks. Area is painful, swollen and red. pt was diagnosed with sialodenitis 3 weeks ago, completed course of augmentin about 1.5wks ago. pts symptoms were improving while on the antibiotic, then a week after completing it, symptoms worsened. + subjective fevers, pain with swallowing. pt went to his ENT again today, had flexible laryngoscope which showed clear airway, and was informed to go to the ED for IV antibiotics. denies SOB, dyspnea, inability to swallowing, drooling, N/V, CP, neck pain, problems walking/going to the bathroom.   	In ED, CT shows L sialodenitis with abscess, ENT saw pt and performed I&D. pt states he feels much better since having the I&D. pt started on 89y/o M with PMH prostate cancer, HLD, HTN, and osteoporosis p/w L. mandibular swelling x 3 wks. Area is painful, swollen and red. pt was diagnosed with sialodenitis 3 weeks ago, completed course of augmentin about 1.5wks ago. pts symptoms were improving while on the antibiotic, then a week after completing it, symptoms worsened. + subjective fevers, pain with swallowing. pt went to his ENT again today, had flexible laryngoscope which showed clear airway, and was informed to go to the ED for IV antibiotics. denies SOB, dyspnea, inability to swallowing, drooling, N/V, CP, neck pain, problems walking/going to the bathroom.   	In ED, CT shows L sialodenitis with abscess, ENT saw pt and performed I&D. pt states he feels much better since having the I&D. pt started on clinda

## 2018-04-17 NOTE — H&P ADULT - ASSESSMENT
89 yo male h/o prstate ca, htn, chol, a/w left submandibular cellulitis with abscess  s/p I&D  wound care  ent f/u  f/u cult  abx as per id  pain control  hydration 88 yo male h/o prstate ca, htn, chol, a/w left submandibular cellulitis with abscess    s/p I&D by ENT  wound care  ent f/u  f/u cult  abx as per id  pain control  hydration    cont home meds    dvt ppx

## 2018-04-17 NOTE — CONSULT NOTE ADULT - SUBJECTIVE AND OBJECTIVE BOX
Patient is a 89y old  Male who presents with a chief complaint of     HPI:    Patient is a 89y old male with HTN, HLD, hx prostate ca, sent in by Dr. Perlman ( Primary ENT) for progressive Left submandibular swelling, and pain a/w difficulty swallowing, pt reports discomfort while sleeping increasing Left neck pain. Pt was being treated with Augmentin PO, with no resolution of symptoms. Pt seen & evaluated  by Dr Perlman this AM, advised to come to ED for further evaluation Imaging ( Neck CT to r/o Abscess), and IV ABX     Laryngoscopy by Dr Perlman today within normal limits, no airway edema or involvement. Pt denies dysphagia/f/c/voice change/drooling/recent travel/weight loss/neck pain/      REVIEW OF SYSTEMS:    CONSTITUTIONAL: No fever, weight loss, or fatigue  EYES: No eye pain, visual disturbances, or discharge  ENMT:  No sore throat  NECK: No pain or stiffness  RESPIRATORY: No cough, wheezing, chills or hemoptysis; No shortness of breath  CARDIOVASCULAR: No chest pain, palpitations, dizziness, or leg swelling  GASTROINTESTINAL: No abdominal or epigastric pain. No nausea, vomiting, or hematemesis; No diarrhea or constipation. No melena or hematochezia.  GENITOURINARY: No dysuria, frequency, hematuria, or incontinence  NEUROLOGICAL: No headaches, memory loss, loss of strength, numbness, or tremors  SKIN: No itching, burning, rashes, or lesions   LYMPH NODES: No enlarged glands  MUSCULOSKELETAL: No joint pain or swelling; No muscle, back, or extremity pain      PAST MEDICAL & SURGICAL HISTORY:  Osteoporosis  HLD (hyperlipidemia)  Hypertension  History of Prostate Cancer  H/O arthroplasty: Left shoulder.  S/P Prostatectomy  S/P Appendectomy      Allergies    No Known Allergies    Intolerances        FAMILY HISTORY:  Family history of prostate cancer (Sibling)  Family history of breast cancer in male (Sibling)      SOCIAL HISTORY:        MEDICATIONS  (STANDING):  amLODIPine   Tablet 5 milliGRAM(s) Oral daily  atorvastatin 20 milliGRAM(s) Oral at bedtime  clindamycin IVPB 600 milliGRAM(s) IV Intermittent every 8 hours  sodium chloride 0.9% lock flush 3 milliLiter(s) IV Push every 8 hours  sodium chloride 0.9%. 1000 milliLiter(s) (100 mL/Hr) IV Continuous <Continuous>    MEDICATIONS  (PRN):      Vital Signs Last 24 Hrs  T(C): 37 (17 Apr 2018 12:14), Max: 37.5 (17 Apr 2018 03:09)  T(F): 98.6 (17 Apr 2018 12:14), Max: 99.5 (17 Apr 2018 03:09)  HR: 81 (17 Apr 2018 12:14) (81 - 93)  BP: 110/68 (17 Apr 2018 12:14) (110/68 - 171/89)  BP(mean): --  RR: 18 (17 Apr 2018 12:14) (16 - 19)  SpO2: 97% (17 Apr 2018 12:14) (96% - 98%)    PHYSICAL EXAM:    GENERAL: NAD, well-groomed  HEAD:  Atraumatic, Normocephalic  EYES: EOMI, PERRLA, conjunctiva and sclera clear  ENMT: No tonsillar erythema, exudates, or enlargement; Moist mucous membranes  NECK: Supple, No JVD  CHEST/LUNG: Clear to percussion bilaterally; No rales, rhonchi, wheezing, or rubs  HEART: Regular rate and rhythm; No murmurs, rubs, or gallops  ABDOMEN: Soft, Nontender, Nondistended; Bowel sounds present  EXTREMITIES:  2+ Peripheral Pulses, No clubbing, cyanosis, or edema  LYMPH: No lymphadenopathy noted  SKIN: No rashes or lesions    LABS:  CBC Full  -  ( 17 Apr 2018 06:49 )  WBC Count : 12.5 K/uL  Hemoglobin : 10.2 g/dL  Hematocrit : 30.2 %  Platelet Count - Automated : 279 K/uL  Mean Cell Volume : 91.1 fl  Mean Cell Hemoglobin : 30.8 pg  Mean Cell Hemoglobin Concentration : 33.8 gm/dL  Auto Neutrophil # : 9.7 K/uL  Auto Lymphocyte # : 1.0 K/uL  Auto Monocyte # : 1.6 K/uL  Auto Eosinophil # : 0.1 K/uL  Auto Basophil # : 0.0 K/uL  Auto Neutrophil % : 77.9 %  Auto Lymphocyte % : 7.7 %  Auto Monocyte % : 13.2 %  Auto Eosinophil % : 1.0 %  Auto Basophil % : 0.3 %      04-17    131<L>  |  95<L>  |  14  ----------------------------<  108<H>  4.1   |  22  |  0.79    Ca    8.7      17 Apr 2018 06:49    TPro  6.5  /  Alb  3.6  /  TBili  0.6  /  DBili  x   /  AST  11  /  ALT  8<L>  /  AlkPhos  60  04-16      LIVER FUNCTIONS - ( 16 Apr 2018 14:00 )  Alb: 3.6 g/dL / Pro: 6.5 g/dL / ALK PHOS: 60 U/L / ALT: 8 U/L RC / AST: 11 U/L / GGT: x                 MICROBIOLOGY:          RADIOLOGY:      < from: CT Neck Soft Tissue w/ IV Cont (04.16.18 @ 15:44) >    IMPRESSION:    Findings consistent with left submandibular gland sialadenitis   complicated by the presence of a 3.4 x 3.2 x 2.9 cm left submandibular   abscess.    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 Clinical   correlation recommended.    < end of copied text >

## 2018-04-17 NOTE — H&P ADULT - NSHPREVIEWOFSYSTEMS_GEN_ALL_CORE
Constitutional: No fatigue or weight loss.  Skin: No rash.  Eyes: No recent vision problems or eye pain.  ENT: No congestion, ear pain, or sore throat.  Endocrine: No thyroid problems.  Cardiovascular: No chest pain or palpation.  Respiratory: No cough, shortness of breath, congestion, or wheezing.  Gastrointestinal: No abdominal pain, nausea, vomiting, or diarrhea.  Genitourinary: No dysuria.  Musculoskeletal: No joint swelling.  Neurologic: No headache.

## 2018-04-17 NOTE — ED ADULT NURSE REASSESSMENT NOTE - NS ED NURSE REASSESS COMMENT FT1
ENT at bedside, dtr present
awaiting CDU PA eval
dtr present, transfer to cdu pending PA eval, without c/o
report taken from Juanita GREEN. pt antibiotic due at 11:00 am
resting in bed, dtr present, awaiting CT neck/soft tissue, vitals satble, clear lungs, skin wdi, denies pain/pruritis, L jaw firm to touch with associated redness, L ear redness, non tender
returns from CT
Pt received from PETER Ward. Pt oriented to CDU & plan of care was discussed. Pt denies any pain at the moment. Pt L mandible is swollen. Unable to visualize wound due to packing & tape. Dressy dry & intact. Pt endorses some difficulty swallowing. Pt on continuous pulse ox maintaining O2 sat >95%. Safety & comfort measures maintained. Call bell in reach. Will continue to monitor.

## 2018-04-17 NOTE — CHART NOTE - NSCHARTNOTEFT_GEN_A_CORE
88 yo male with left sialoadenitiis complicated by submandibular abscess s/p Incision and Drainage in ED yesterday. Dressing change at bedside tonight performed with saline flush into wound and massaging of wound edges to evacuate drainage.  There was a smaller amount of serosanginous with pus drainage as compared to the mornings dressing change.. Wound was packed with iodoform guaze and dressing was applied. Pt. tolerated preocedure. Pt. to continue with IV antibiotics (Clinda) and daily dressing changes. ENT will continue to follow.

## 2018-04-17 NOTE — PROGRESS NOTE ADULT - SUBJECTIVE AND OBJECTIVE BOX
POD/STATUS POST/ENT ISSUE: L submandibular swelling and pain    INTERVAL HPI: 89 yo male seen and examined in CDU after overnight stay for IV abx s/p incision and drainage of left submandibular abscess in ED last night. WBC 12.5, afebrile.    Vital Signs Last 24 Hrs  T(C): 37.2 (17 Apr 2018 07:56), Max: 37.5 (17 Apr 2018 03:09)  T(F): 98.9 (17 Apr 2018 07:56), Max: 99.5 (17 Apr 2018 03:09)  HR: 81 (17 Apr 2018 07:56) (81 - 96)  BP: 124/73 (17 Apr 2018 07:56) (116/72 - 171/89)  BP(mean): --  RR: 18 (17 Apr 2018 07:56) (16 - 20)  SpO2: 98% (17 Apr 2018 07:56) (96% - 99%)    PHYSICAL EXAM:  Gen: NAD, well-developed  Head: Normocephalic, Atraumatic  Eyes: PERRL, EOMI, no scleral injection  Nose: Nares bilaterally patent, no discharge  Mouth: Mucosa moist, tongue/uvula midline, oropharynx clear  Neck: Flat, supple, no lymphadenopathy, trachea midline, no masses  Resp: breathing easily, no stridor  CV: no peripheral edema/cyanosis    LABS:                        10.2   12.5  )-----------( 279      ( 17 Apr 2018 06:49 )             30.2 POD/STATUS POST/ENT ISSUE: L submandibular swelling and pain    INTERVAL HPI: 88 yo male seen and examined in CDU after overnight stay for IV abx s/p incision and drainage of left submandibular abscess in ED last night. WBC 12.5, afebrile. Pt eating breakfast without difficulty, breathing comfortably on room air.    Vital Signs Last 24 Hrs  T(C): 37.2 (17 Apr 2018 07:56), Max: 37.5 (17 Apr 2018 03:09)  T(F): 98.9 (17 Apr 2018 07:56), Max: 99.5 (17 Apr 2018 03:09)  HR: 81 (17 Apr 2018 07:56) (81 - 96)  BP: 124/73 (17 Apr 2018 07:56) (116/72 - 171/89)  BP(mean): --  RR: 18 (17 Apr 2018 07:56) (16 - 20)  SpO2: 98% (17 Apr 2018 07:56) (96% - 99%)    PHYSICAL EXAM:  Gen: NAD, well-developed  Head: Normocephalic, Atraumatic  Eyes: PERRL, EOMI, no scleral injection  Nose: Nares bilaterally patent, no discharge  Mouth: Mucosa moist, tongue/uvula midline, oropharynx clear  Neck: Flat, supple, no lymphadenopathy, trachea midline, left submandibular abscess draining serosanguinous drainage, 4x4 gauze saturated, iodofrom packing removed and repacked, new gauze pressure dressing placed  Resp: breathing easily, no stridor  CV: no peripheral edema/cyanosis    LABS:                        10.2   12.5  )-----------( 279      ( 17 Apr 2018 06:49 )             30.2

## 2018-04-17 NOTE — CONSULT NOTE ADULT - ASSESSMENT
Patient is a 89y old male with HTN, HLD, hx prostate ca, sent in by Dr. Perlman ( Primary ENT) for progressive Left submandibular swelling, and pain a/w difficulty swallowing, pt reports discomfort while sleeping increasing Left neck pain. Pt was being treated with Augmentin PO, with no resolution of symptoms. Pt seen & evaluated  by Dr Perlman this AM, advised to come to ED for further evaluation Imaging ( Neck CT to r/o Abscess), and IV ABX     Laryngoscopy by Dr Perlman today within normal limits, no airway edema or involvement. Pt denies dysphagia/f/c/voice change/drooling/recent travel/weight loss/neck pain/    Problem/Plan - 1:    Submandibular abscess    - s/p I&D and cultures sent    - Agree with continuing clindamycin for gram positive and anerobic coverage.  Will add vancomycin to cover for MRSA.    - Cont wound care, packing in place.  ENT following    d/w patient and daughter at bedside      Will follow,    Shanika Espinoza  [phone]

## 2018-04-17 NOTE — ED CDU PROVIDER SUBSEQUENT DAY NOTE - SKIN, MLM
Left mandibular wound as above in HEAD. Skin normal color for race, warm, dry and intact. No evidence of rash.

## 2018-04-17 NOTE — PATIENT PROFILE ADULT. - ABILITY TO HEAR (WITH HEARING AID OR HEARING APPLIANCE IF NORMALLY USED):
Mildly to Moderately Impaired: difficulty hearing in some environments or speaker may need to increase volume or speak distinctly/bilateral [place]

## 2018-04-17 NOTE — PROGRESS NOTE ADULT - ASSESSMENT
88 yo male  s/p incision and drainage of left submandibular abscess in ED last night, received 2 doses IV abx. WBC 12.5, trending down, afebrile. 89 yo male  s/p incision and drainage of left submandibular abscess in ED last night, received 2 doses IV abx. WBC 12.5, trending down, afebrile. Draining left submandibular abscess iodoform packing changed, saturated gauze replaced with new pressure dressing.

## 2018-04-17 NOTE — H&P ADULT - NSHPPHYSICALEXAM_GEN_ALL_CORE
Vital Signs Last 24 Hrs  T(C): 36.8 (17 Apr 2018 17:56), Max: 37.5 (17 Apr 2018 03:09)  T(F): 98.2 (17 Apr 2018 17:56), Max: 99.5 (17 Apr 2018 03:09)  HR: 77 (17 Apr 2018 17:56) (77 - 93)  BP: 110/73 (17 Apr 2018 17:56) (110/68 - 159/82)  BP(mean): --  RR: 18 (17 Apr 2018 17:56) (16 - 18)  SpO2: 97% (17 Apr 2018 17:56) (96% - 98%)    PHYSICAL EXAM:  GENERAL: NAD, well-developed  HEAD:  Atraumatic, Normocephalic.  left mandibular dressing in place  EYES: EOMI, PERRLA, conjunctiva and sclera clear  NECK: Supple, No JVD  CHEST/LUNG: Clear to auscultation bilaterally; No wheeze  HEART: Regular rate and rhythm; No murmurs, rubs, or gallops  ABDOMEN: Soft, Nontender, Nondistended; Bowel sounds present  EXTREMITIES:  2+ Peripheral Pulses, No clubbing, cyanosis, or edema  PSYCH: AAOx3  NEUROLOGY: non-focal  SKIN: No rashes or lesions

## 2018-04-17 NOTE — ED CDU PROVIDER SUBSEQUENT DAY NOTE - PROGRESS NOTE DETAILS
Patient seen at bedside in NAD.  VSS.  Patient resting comfortably without complaints. Will continue to monitor. - Boni Morrow PA-C Patient seen at bedside in NAD.  VSS.  Patient resting comfortably without complaints. Very hard of hearing at baseline. Awaiting daughter to bring back hearing aids. Pt feels well and states improved since yesterday. Packing in place. No fever/chills overnight and has had improvement of pain. Will continue to monitor. - Boni Morrow PA-C Patient seen at bedside in NAD.  VSS.  Patient resting comfortably without complaints. Continues to report improvement this AM. Feels swelling is still there but improving. No fever/chills, dysphagia, headache, trismus. ENT saw pt in AM and changed packing and will round on patient again in afternoon w/ attending. Will await final recs. Continue clindamycin. - Boni Morrow PA-C ENT attending re-evaluated pt at bedside and decided given pt's wound still draining a lot in setting of age and co-morbidities will need admission for additional days of IV abx and wound care. Called PMD Dr. Sanjay Puri and informed him of this and he instructed to admit to whoever is on call for unattached. ED attending made aware. Will page unattached med. - Boni Morrow PA-C Spoke with Dr. Rudolph regarding admission. Informed him of pt's presentation, ENT evaluations thus far and ENTs recs for admission for continued IV abx and wound care as inpatient. He accepted pt under his service. Admission orders in. - Boni Morrow PA-C

## 2018-04-17 NOTE — PROGRESS NOTE ADULT - PROBLEM SELECTOR PLAN 1
Iodoform packing changed, pressure dressing changed  Continue with medications  Dr. Roberson to see pt Iodoform packing changed, pressure dressing changed  Continue with medications  Pending left submandibular abscess culture  Dr. Roberson to see pt

## 2018-04-17 NOTE — PATIENT PROFILE ADULT. - SURGICAL SITE INCISION
"FLASHES / FLOATERS / POSTERIOR VITREOUS DETACHMENT    Call the clinic if you have any further changes in symptoms.  Including:  Increased numbers of floaters or flashing lights, dimness or darkness that moves through or stays constant in your vision, or any pain in the eye (s).            DRY EYES:  Use Over The Counter artificial tears as needed for dry eye symptoms.  Some common brands include:  Systane, Optive, and Refresh.  These drops can be used as frequently as desired, but may be most helpful use during long periods of concentrated work.  For example, reading / working at the computer.  Avoid drops that "get redness out", as these contain medication that may further irritate the eyes.    ALLERGY EYES / SYMPTOMS:    Over the counter medications include--Zaditor and Alaway  Use as directed 1-2 drops daily for symptoms of itching / watering eyes.  These drops will not help for dry eye or exposure symptoms.      " no

## 2018-04-18 LAB
ANION GAP SERPL CALC-SCNC: 11 MMOL/L — SIGNIFICANT CHANGE UP (ref 5–17)
BUN SERPL-MCNC: 16 MG/DL — SIGNIFICANT CHANGE UP (ref 7–23)
CALCIUM SERPL-MCNC: 9 MG/DL — SIGNIFICANT CHANGE UP (ref 8.4–10.5)
CHLORIDE SERPL-SCNC: 97 MMOL/L — SIGNIFICANT CHANGE UP (ref 96–108)
CO2 SERPL-SCNC: 25 MMOL/L — SIGNIFICANT CHANGE UP (ref 22–31)
CREAT SERPL-MCNC: 0.85 MG/DL — SIGNIFICANT CHANGE UP (ref 0.5–1.3)
CULTURE RESULTS: SIGNIFICANT CHANGE UP
GLUCOSE SERPL-MCNC: 106 MG/DL — HIGH (ref 70–99)
HCT VFR BLD CALC: 29.4 % — LOW (ref 39–50)
HGB BLD-MCNC: 10.2 G/DL — LOW (ref 13–17)
MCHC RBC-ENTMCNC: 31.9 PG — SIGNIFICANT CHANGE UP (ref 27–34)
MCHC RBC-ENTMCNC: 34.6 GM/DL — SIGNIFICANT CHANGE UP (ref 32–36)
MCV RBC AUTO: 92.1 FL — SIGNIFICANT CHANGE UP (ref 80–100)
PLATELET # BLD AUTO: 259 K/UL — SIGNIFICANT CHANGE UP (ref 150–400)
POTASSIUM SERPL-MCNC: 4.1 MMOL/L — SIGNIFICANT CHANGE UP (ref 3.5–5.3)
POTASSIUM SERPL-SCNC: 4.1 MMOL/L — SIGNIFICANT CHANGE UP (ref 3.5–5.3)
RBC # BLD: 3.19 M/UL — LOW (ref 4.2–5.8)
RBC # FLD: 11.1 % — SIGNIFICANT CHANGE UP (ref 10.3–14.5)
SODIUM SERPL-SCNC: 133 MMOL/L — LOW (ref 135–145)
SPECIMEN SOURCE: SIGNIFICANT CHANGE UP
WBC # BLD: 7.5 K/UL — SIGNIFICANT CHANGE UP (ref 3.8–10.5)
WBC # FLD AUTO: 7.5 K/UL — SIGNIFICANT CHANGE UP (ref 3.8–10.5)

## 2018-04-18 PROCEDURE — 99024 POSTOP FOLLOW-UP VISIT: CPT

## 2018-04-18 RX ADMIN — AMLODIPINE BESYLATE 5 MILLIGRAM(S): 2.5 TABLET ORAL at 05:03

## 2018-04-18 RX ADMIN — SODIUM CHLORIDE 3 MILLILITER(S): 9 INJECTION INTRAMUSCULAR; INTRAVENOUS; SUBCUTANEOUS at 21:14

## 2018-04-18 RX ADMIN — Medication 100 MILLIGRAM(S): at 05:03

## 2018-04-18 RX ADMIN — Medication 100 MILLIGRAM(S): at 21:14

## 2018-04-18 RX ADMIN — Medication 100 MILLIGRAM(S): at 13:13

## 2018-04-18 RX ADMIN — SODIUM CHLORIDE 3 MILLILITER(S): 9 INJECTION INTRAMUSCULAR; INTRAVENOUS; SUBCUTANEOUS at 11:34

## 2018-04-18 RX ADMIN — ATORVASTATIN CALCIUM 20 MILLIGRAM(S): 80 TABLET, FILM COATED ORAL at 21:14

## 2018-04-18 RX ADMIN — Medication 250 MILLIGRAM(S): at 17:53

## 2018-04-18 RX ADMIN — SODIUM CHLORIDE 3 MILLILITER(S): 9 INJECTION INTRAMUSCULAR; INTRAVENOUS; SUBCUTANEOUS at 05:03

## 2018-04-18 NOTE — PROGRESS NOTE ADULT - PROBLEM SELECTOR PLAN 1
Iodoform packing changed, pressure dressing changed  Continue with medications  Pending left submandibular abscess culture

## 2018-04-18 NOTE — PROGRESS NOTE ADULT - SUBJECTIVE AND OBJECTIVE BOX
POD/STATUS POST/ENT ISSUE: L submandibular swelling and pain      INTERVAL HPI: 89 yo male seen and examined at bedside after admitted for IV abx s/p incision and drainage of left submandibular abscess in ED on 4/16. WBC 12.5, afebrile. Pt eating breakfast without difficulty, breathing comfortably on room air. Pt denies sore throat, SOB, hoarseness, fevers      Vital Signs Last 24 Hrs  T(C): 36.6 (18 Apr 2018 04:08), Max: 37.1 (17 Apr 2018 16:55)  T(F): 97.9 (18 Apr 2018 04:08), Max: 98.7 (17 Apr 2018 16:55)  HR: 68 (18 Apr 2018 04:08) (68 - 81)  BP: 105/70 (18 Apr 2018 04:08) (105/70 - 133/86)  BP(mean): --  RR: 18 (18 Apr 2018 04:08) (18 - 18)  SpO2: 96% (18 Apr 2018 04:08) (96% - 97%)    PHYSICAL EXAM:  Gen: NAD, well-developed  Head: Normocephalic, Atraumatic  Eyes: PERRL, EOMI, no scleral injection  Nose: Nares bilaterally patent, no discharge  Mouth: Mucosa moist, tongue/uvula midline, oropharynx clear  Neck: minimal serosanguinous drainage from left submandibular wound, mildly tender to palpation, DRSG and iodoform packing in place, trachea midline, no masses  Resp:  no stridor  CV: no peripheral edema/cyanosis    LABS:                        10.2   12.5  )-----------( 279      ( 17 Apr 2018 06:49 )             30.2 POD/STATUS POST/ENT ISSUE: L submandibular swelling and pain      INTERVAL HPI: 88 yo male seen and examined at bedside after admitted for IV abx s/p incision and drainage of left submandibular abscess in ED on 4/16. WBC 12.5, afebrile. Pt admits to feeling much better today. Pt eating breakfast without difficulty, breathing comfortably on room air. Pt denies sore throat, SOB, hoarseness, fevers      Vital Signs Last 24 Hrs  T(C): 36.6 (18 Apr 2018 04:08), Max: 37.1 (17 Apr 2018 16:55)  T(F): 97.9 (18 Apr 2018 04:08), Max: 98.7 (17 Apr 2018 16:55)  HR: 68 (18 Apr 2018 04:08) (68 - 81)  BP: 105/70 (18 Apr 2018 04:08) (105/70 - 133/86)  BP(mean): --  RR: 18 (18 Apr 2018 04:08) (18 - 18)  SpO2: 96% (18 Apr 2018 04:08) (96% - 97%)    PHYSICAL EXAM:  Gen: NAD, well-developed  Head: Normocephalic, Atraumatic  Eyes: PERRL, EOMI, no scleral injection  Nose: Nares bilaterally patent, no discharge  Mouth: Mucosa moist, tongue/uvula midline, oropharynx clear  Neck: minimal serosanguinous drainage from left submandibular wound, mildly tender to palpation, DRSG and wound washed and  iodoform packing changed, trachea midline, no masses  Resp:  no stridor  CV: no peripheral edema/cyanosis    LABS:                        10.2   12.5  )-----------( 279      ( 17 Apr 2018 06:49 )             30.2

## 2018-04-18 NOTE — PROGRESS NOTE ADULT - ASSESSMENT
Patient is a 89y old male with HTN, HLD, hx prostate ca, sent in by Dr. Perlman ( Primary ENT) for progressive Left submandibular swelling, and pain a/w difficulty swallowing, pt reports discomfort while sleeping increasing Left neck pain. Pt was being treated with Augmentin PO, with no resolution of symptoms. Pt seen & evaluated  by Dr Perlman this AM, advised to come to ED for further evaluation Imaging ( Neck CT to r/o Abscess), and IV ABX     Laryngoscopy by Dr Perlman today within normal limits, no airway edema or involvement. Pt denies dysphagia/f/c/voice change/drooling/recent travel/weight loss/neck pain/    Problem/Plan - 1:    Submandibular abscess    - s/p I&D and cultures sent - growing alpha strep species.  Awaiting final cultures results/sensitivities.      - Agree with continuing clindamycin for gram positive and anerobic coverage.  Will add vancomycin to cover for MRSA pending final results    - Cont wound care, packing in place.  ENT following    - Cont iv abx for now - pt still with significant induration    d/w patient and daughter at bedside      Will follow,    Shanika Espinoza  [phone]

## 2018-04-18 NOTE — PROGRESS NOTE ADULT - ASSESSMENT
89 yo male  s/p incision and drainage of left submandibular abscess in ED last night. WBC 12.5, trending down, afebrile. Draining left submandibular abscess iodoform packing changed, saturated gauze replaced with new pressure dressing.

## 2018-04-18 NOTE — PROGRESS NOTE ADULT - SUBJECTIVE AND OBJECTIVE BOX
Infectious Diseases progress note:    Subjective: Feels better.  Rt submandibular area still indurated/swollen.  Packing in place.  No new complaints.  WBC trending down.  Daughter at bedside.    ROS:  CONSTITUTIONAL:  No fever, chills, rigors  CARDIOVASCULAR:  No chest pain or palpitations  RESPIRATORY:   No SOB, cough, dyspnea on exertion.  No wheezing  GASTROINTESTINAL:  No abd pain, N/V, diarrhea/constipation  EXTREMITIES:  No swelling or joint pain  GENITOURINARY:  No burning on urination, increased frequency or urgency.  No flank pain  NEUROLOGIC:  No HA, visual disturbances  SKIN: No rashes    Allergies    No Known Allergies    Intolerances        ANTIBIOTICS/RELEVANT:  antimicrobials  clindamycin IVPB 600 milliGRAM(s) IV Intermittent every 8 hours  vancomycin  IVPB 1000 milliGRAM(s) IV Intermittent every 24 hours    immunologic:    OTHER:  amLODIPine   Tablet 5 milliGRAM(s) Oral daily  atorvastatin 20 milliGRAM(s) Oral at bedtime  sodium chloride 0.9% lock flush 3 milliLiter(s) IV Push every 8 hours  sodium chloride 0.9%. 1000 milliLiter(s) IV Continuous <Continuous>      Objective:  Vital Signs Last 24 Hrs  T(C): 36.8 (18 Apr 2018 12:11), Max: 36.8 (18 Apr 2018 12:11)  T(F): 98.3 (18 Apr 2018 12:11), Max: 98.3 (18 Apr 2018 12:11)  HR: 70 (18 Apr 2018 12:11) (68 - 70)  BP: 110/62 (18 Apr 2018 12:11) (105/70 - 110/62)  BP(mean): --  RR: 18 (18 Apr 2018 12:11) (18 - 18)  SpO2: 97% (18 Apr 2018 12:11) (96% - 97%)    PHYSICAL EXAM:  Constitutional:NAD  Eyes:MIRTA, EOMI  Ear/Nose/Throat: no thrush, mucositis.  Moist mucous membranes	  Neck:no JVD, no lymphadenopathy, supple  Respiratory: CTA regan  Cardiovascular: S1S2 RRR, no murmurs  Gastrointestinal:soft, nontender,  nondistended (+) BS  Extremities:no e/e/c  Skin:  rt submandibular area with packing in place.  Erythematous/indurated        LABS:                        10.2   7.5   )-----------( 259      ( 18 Apr 2018 10:24 )             29.4     04-18    133<L>  |  97  |  16  ----------------------------<  106<H>  4.1   |  25  |  0.85    Ca    9.0      18 Apr 2018 10:14              MICROBIOLOGY:    Culture - Other (04.16.18 @ 20:43)    Specimen Source: Skin Submandibular abscess, left    Culture Results:   Moderate Alpha hemolytic strep    Culture - Blood (04.16.18 @ 16:56)    Specimen Source: .Blood Blood-Peripheral    Culture Results:   No growth to date.    Culture - Blood (04.16.18 @ 16:56)    Specimen Source: .Blood Blood-Peripheral    Culture Results:   No growth to date.          RADIOLOGY & ADDITIONAL STUDIES:

## 2018-04-18 NOTE — PROGRESS NOTE ADULT - ASSESSMENT
89 yo male h/o prstate ca, htn, chol, a/w left submandibular cellulitis with abscess    s/p I&D by ENT  wound care  ent f/u  f/u cult and sens  abx as per id  pain control  hydration    cont home meds    dvt ppx

## 2018-04-18 NOTE — PROGRESS NOTE ADULT - SUBJECTIVE AND OBJECTIVE BOX
MANOLO WALDRON  89y Male  MRN:[id number]    Patient is a 89y old  Male who presents with a chief complaint of Cellulitis and abscess of mouth (17 Apr 2018 18:43)    HPI:  88y/o M with PMH prostate cancer, HLD, HTN, and osteoporosis p/w L. mandibular swelling x 3 wks. Area is painful, swollen and red. pt was diagnosed with sialodenitis 3 weeks ago, completed course of augmentin about 1.5wks ago. pts symptoms were improving while on the antibiotic, then a week after completing it, symptoms worsened. + subjective fevers, pain with swallowing. pt went to his ENT again today, had flexible laryngoscope which showed clear airway, and was informed to go to the ED for IV antibiotics. denies SOB, dyspnea, inability to swallowing, drooling, N/V, CP, neck pain, problems walking/going to the bathroom.   	In ED, CT shows L sialodenitis with abscess, ENT saw pt and performed I&D. pt states he feels much better since having the I&D. pt started on clinda (17 Apr 2018 17:14)      Patient seen and evaluated at bedside. No acute events overnight except as noted.    Interval HPI: no acute events     PAST MEDICAL & SURGICAL HISTORY:  Osteoporosis  HLD (hyperlipidemia)  Hypertension  History of Prostate Cancer  H/O arthroplasty: Left shoulder.  S/P Prostatectomy  S/P Appendectomy      REVIEW OF SYSTEMS:  as per hpi    VITALS:  Vital Signs Last 24 Hrs  T(C): 36.6 (18 Apr 2018 04:08), Max: 36.8 (17 Apr 2018 17:56)  T(F): 97.9 (18 Apr 2018 04:08), Max: 98.2 (17 Apr 2018 17:56)  HR: 68 (18 Apr 2018 04:08) (68 - 77)  BP: 105/70 (18 Apr 2018 04:08) (105/70 - 110/73)  BP(mean): --  RR: 18 (18 Apr 2018 04:08) (18 - 18)  SpO2: 96% (18 Apr 2018 04:08) (96% - 97%)  CAPILLARY BLOOD GLUCOSE        I&O's Summary    17 Apr 2018 07:01  -  18 Apr 2018 07:00  --------------------------------------------------------  IN: 470 mL / OUT: 650 mL / NET: -180 mL    18 Apr 2018 07:01  -  18 Apr 2018 17:53  --------------------------------------------------------  IN: 860 mL / OUT: 761 mL / NET: 99 mL        PHYSICAL EXAM:  GENERAL: NAD, well-developed  HEAD:  Atraumatic, Normocephalic, left mandibular dressing in place  EYES: EOMI, PERRLA, conjunctiva and sclera clear  NECK: Supple, No JVD  CHEST/LUNG: Clear to auscultation bilaterally; No wheeze  HEART: S1, S2; No murmurs, rubs, or gallops  ABDOMEN: Soft, Nontender, Nondistended; Bowel sounds present  EXTREMITIES:  2+ Peripheral Pulses, No clubbing, cyanosis, or edema  PSYCH: Normal affect  NEUROLOGY: AAOX3; non-focal  SKIN: No rashes or lesions    Consultant(s) Notes Reviewed:  [x ] YES  [ ] NO  Care Discussed with Consultants/Other Providers [ x] YES  [ ] NO    MEDS:  MEDICATIONS  (STANDING):  amLODIPine   Tablet 5 milliGRAM(s) Oral daily  atorvastatin 20 milliGRAM(s) Oral at bedtime  clindamycin IVPB 600 milliGRAM(s) IV Intermittent every 8 hours  sodium chloride 0.9% lock flush 3 milliLiter(s) IV Push every 8 hours  sodium chloride 0.9%. 1000 milliLiter(s) (100 mL/Hr) IV Continuous <Continuous>  vancomycin  IVPB 1000 milliGRAM(s) IV Intermittent every 24 hours    MEDICATIONS  (PRN):    ALLERGIES:  No Known Allergies      LABS:                        10.2   7.5   )-----------( 259      ( 18 Apr 2018 10:24 )             29.4     04-18    133<L>  |  97  |  16  ----------------------------<  106<H>  4.1   |  25  |  0.85    Ca    9.0      18 Apr 2018 10:14                TSH:   A1c:  BNP:  Lipid panel:   cultures: Culture Results:   Few Alpha hemolytic strep (04-16 @ 20:43)  Culture Results:   No growth to date. (04-16 @ 16:56)  Culture Results:   No growth to date. (04-16 @ 16:56)      RADIOLOGY & ADDITIONAL TESTS:    Imaging Personally Reviewed:  [ ] YES  [ ] NO

## 2018-04-19 ENCOUNTER — TRANSCRIPTION ENCOUNTER (OUTPATIENT)
Age: 83
End: 2018-04-19

## 2018-04-19 LAB
ANION GAP SERPL CALC-SCNC: 11 MMOL/L — SIGNIFICANT CHANGE UP (ref 5–17)
BUN SERPL-MCNC: 17 MG/DL — SIGNIFICANT CHANGE UP (ref 7–23)
CALCIUM SERPL-MCNC: 8.6 MG/DL — SIGNIFICANT CHANGE UP (ref 8.4–10.5)
CHLORIDE SERPL-SCNC: 99 MMOL/L — SIGNIFICANT CHANGE UP (ref 96–108)
CO2 SERPL-SCNC: 22 MMOL/L — SIGNIFICANT CHANGE UP (ref 22–31)
CREAT SERPL-MCNC: 0.88 MG/DL — SIGNIFICANT CHANGE UP (ref 0.5–1.3)
GLUCOSE SERPL-MCNC: 100 MG/DL — HIGH (ref 70–99)
HCT VFR BLD CALC: 28.1 % — LOW (ref 39–50)
HGB BLD-MCNC: 9.5 G/DL — LOW (ref 13–17)
MCHC RBC-ENTMCNC: 30 PG — SIGNIFICANT CHANGE UP (ref 27–34)
MCHC RBC-ENTMCNC: 33.8 GM/DL — SIGNIFICANT CHANGE UP (ref 32–36)
MCV RBC AUTO: 88.6 FL — SIGNIFICANT CHANGE UP (ref 80–100)
PLATELET # BLD AUTO: 270 K/UL — SIGNIFICANT CHANGE UP (ref 150–400)
POTASSIUM SERPL-MCNC: 4.2 MMOL/L — SIGNIFICANT CHANGE UP (ref 3.5–5.3)
POTASSIUM SERPL-SCNC: 4.2 MMOL/L — SIGNIFICANT CHANGE UP (ref 3.5–5.3)
RBC # BLD: 3.17 M/UL — LOW (ref 4.2–5.8)
RBC # FLD: 12.6 % — SIGNIFICANT CHANGE UP (ref 10.3–14.5)
SODIUM SERPL-SCNC: 132 MMOL/L — LOW (ref 135–145)
VANCOMYCIN TROUGH SERPL-MCNC: 4.8 UG/ML — LOW (ref 10–20)
WBC # BLD: 8.69 K/UL — SIGNIFICANT CHANGE UP (ref 3.8–10.5)
WBC # FLD AUTO: 8.69 K/UL — SIGNIFICANT CHANGE UP (ref 3.8–10.5)

## 2018-04-19 PROCEDURE — 99024 POSTOP FOLLOW-UP VISIT: CPT

## 2018-04-19 RX ADMIN — SODIUM CHLORIDE 100 MILLILITER(S): 9 INJECTION INTRAMUSCULAR; INTRAVENOUS; SUBCUTANEOUS at 14:12

## 2018-04-19 RX ADMIN — Medication 250 MILLIGRAM(S): at 18:14

## 2018-04-19 RX ADMIN — SODIUM CHLORIDE 100 MILLILITER(S): 9 INJECTION INTRAMUSCULAR; INTRAVENOUS; SUBCUTANEOUS at 06:10

## 2018-04-19 RX ADMIN — SODIUM CHLORIDE 100 MILLILITER(S): 9 INJECTION INTRAMUSCULAR; INTRAVENOUS; SUBCUTANEOUS at 23:31

## 2018-04-19 RX ADMIN — ATORVASTATIN CALCIUM 20 MILLIGRAM(S): 80 TABLET, FILM COATED ORAL at 23:31

## 2018-04-19 RX ADMIN — SODIUM CHLORIDE 3 MILLILITER(S): 9 INJECTION INTRAMUSCULAR; INTRAVENOUS; SUBCUTANEOUS at 14:13

## 2018-04-19 RX ADMIN — Medication 100 MILLIGRAM(S): at 05:08

## 2018-04-19 RX ADMIN — SODIUM CHLORIDE 3 MILLILITER(S): 9 INJECTION INTRAMUSCULAR; INTRAVENOUS; SUBCUTANEOUS at 23:06

## 2018-04-19 RX ADMIN — AMLODIPINE BESYLATE 5 MILLIGRAM(S): 2.5 TABLET ORAL at 05:08

## 2018-04-19 RX ADMIN — SODIUM CHLORIDE 3 MILLILITER(S): 9 INJECTION INTRAMUSCULAR; INTRAVENOUS; SUBCUTANEOUS at 05:10

## 2018-04-19 RX ADMIN — Medication 100 MILLIGRAM(S): at 23:31

## 2018-04-19 RX ADMIN — Medication 100 MILLIGRAM(S): at 14:12

## 2018-04-19 NOTE — PROGRESS NOTE ADULT - SUBJECTIVE AND OBJECTIVE BOX
MANOLO WALDRON  89y Male  MRN:[id number]    Patient is a 89y old  Male who presents with a chief complaint of Cellulitis and abscess of mouth (17 Apr 2018 18:43)    HPI:  88y/o M with PMH prostate cancer, HLD, HTN, and osteoporosis p/w L. mandibular swelling x 3 wks. Area is painful, swollen and red. pt was diagnosed with sialodenitis 3 weeks ago, completed course of augmentin about 1.5wks ago. pts symptoms were improving while on the antibiotic, then a week after completing it, symptoms worsened. + subjective fevers, pain with swallowing. pt went to his ENT again today, had flexible laryngoscope which showed clear airway, and was informed to go to the ED for IV antibiotics. denies SOB, dyspnea, inability to swallowing, drooling, N/V, CP, neck pain, problems walking/going to the bathroom.   	In ED, CT shows L sialodenitis with abscess, ENT saw pt and performed I&D. pt states he feels much better since having the I&D. pt started on clinda (17 Apr 2018 17:14)      Patient seen and evaluated at bedside. No acute events overnight except as noted.    Interval HPI: no acute events     PAST MEDICAL & SURGICAL HISTORY:  Osteoporosis  HLD (hyperlipidemia)  Hypertension  History of Prostate Cancer  H/O arthroplasty: Left shoulder.  S/P Prostatectomy  S/P Appendectomy      REVIEW OF SYSTEMS:  as per hpi    VITALS:  Vital Signs Last 24 Hrs  T(C): 36.9 (19 Apr 2018 21:24), Max: 36.9 (19 Apr 2018 21:24)  T(F): 98.4 (19 Apr 2018 21:24), Max: 98.4 (19 Apr 2018 21:24)  HR: 71 (19 Apr 2018 21:24) (71 - 79)  BP: 124/78 (19 Apr 2018 21:24) (116/70 - 124/78)  BP(mean): --  RR: 18 (19 Apr 2018 21:24) (18 - 18)  SpO2: 95% (19 Apr 2018 21:24) (95% - 97%)    PHYSICAL EXAM:  GENERAL: NAD, well-developed  HEAD:  Atraumatic, Normocephalic, left mandibular dressing in place  EYES: EOMI, PERRLA, conjunctiva and sclera clear  NECK: Supple, No JVD  CHEST/LUNG: Clear to auscultation bilaterally; No wheeze  HEART: S1, S2; No murmurs, rubs, or gallops  ABDOMEN: Soft, Nontender, Nondistended; Bowel sounds present  EXTREMITIES:  2+ Peripheral Pulses, No clubbing, cyanosis, or edema  PSYCH: Normal affect  NEUROLOGY: AAOX3; non-focal  SKIN: No rashes or lesions    Consultant(s) Notes Reviewed:  [x ] YES  [ ] NO  Care Discussed with Consultants/Other Providers [ x] YES  [ ] NO    MEDS:  MEDICATIONS  (STANDING):  amLODIPine   Tablet 5 milliGRAM(s) Oral daily  atorvastatin 20 milliGRAM(s) Oral at bedtime  clindamycin IVPB 600 milliGRAM(s) IV Intermittent every 8 hours  sodium chloride 0.9% lock flush 3 milliLiter(s) IV Push every 8 hours  sodium chloride 0.9%. 1000 milliLiter(s) (100 mL/Hr) IV Continuous <Continuous>  vancomycin  IVPB 1000 milliGRAM(s) IV Intermittent every 24 hours    MEDICATIONS  (PRN):      ALLERGIES:  No Known Allergies      LABS:                                       9.5    8.69  )-----------( 270      ( 19 Apr 2018 09:28 )             28.1   04-19    132<L>  |  99  |  17  ----------------------------<  100<H>  4.2   |  22  |  0.88    Ca    8.6      19 Apr 2018 07:17            cultures: Culture Results:   Few Alpha hemolytic strep (04-16 @ 20:43)  Culture Results:   No growth to date. (04-16 @ 16:56)  Culture Results:   No growth to date. (04-16 @ 16:56)

## 2018-04-19 NOTE — DISCHARGE NOTE ADULT - HOSPITAL COURSE
89y old male with HTN, HLD, hx prostate ca, sent in by Dr. Perlman ( Primary ENT) for progressive Left submandibular swelling, and pain a/w difficulty swallowing, pt reports discomfort while sleeping increasing Left neck pain. Pt was being treated with Augmentin PO, with no resolution of symptoms. Pt seen & evaluated  by Dr Perlman this AM, advised to come to ED for further evaluation Imaging ( Neck CT to r/o Abscess), and IV ABX.   Laryngoscopy by Dr Perlman within normal limits, no airway edema or involvement.   - s/p I&D by ENT. cultures sent - final cultures reported as alpha hemolytic strep (i.e. strep milleri group);  continued clindamycin for gram positive and anerobic coverage.  - Pt clinically improving.  Can change to PO clindamycin 300mg po qid through May 7th (to complete 3 week course for submandibular abscess).  Discharged home

## 2018-04-19 NOTE — PROGRESS NOTE ADULT - SUBJECTIVE AND OBJECTIVE BOX
POD/STATUS POST/ENT ISSUE: L submandibular abscess s/p I&D 4/16      INTERVAL HPI: 88 yo male seen and examined at bedside after admitted for IV abx s/p incision and drainage of left submandibular abscess in ED on 4/16. Pt admits to feeling much better today. Pt eating breakfast without difficulty, breathing comfortably on room air. Pt denies sore throat, SOB, hoarseness, fevers    Vital Signs Last 24 Hrs  T(C): 36.7 (19 Apr 2018 04:04), Max: 36.8 (18 Apr 2018 12:11)  T(F): 98.1 (19 Apr 2018 04:04), Max: 98.3 (18 Apr 2018 12:11)  HR: 73 (19 Apr 2018 04:04) (70 - 76)  BP: 116/70 (19 Apr 2018 04:04) (110/62 - 125/7)  BP(mean): --  RR: 18 (19 Apr 2018 04:04) (18 - 18)  SpO2: 97% (19 Apr 2018 04:04) (97% - 97%)    LABS:                        10.2   7.5   )-----------( 259      ( 18 Apr 2018 10:24 )             29.4     PHYSICAL EXAM:  Gen: NAD, well-developed  Head: Normocephalic, Atraumatic  Eyes: PERRL, EOMI, no scleral injection  Nose: Nares bilaterally patent, no discharge  Mouth: Mucosa moist, tongue/uvula midline, oropharynx clear  Neck: minimal serosanguinous drainage from left submandibular wound, mildly tender to palpation, DRSG and wound washed and  iodoform packing changed, trachea midline, no masses  Resp:  no stridor  CV: no peripheral edema/cyanosis    Culture - Other (04.16.18 @ 20:43)    Specimen Source: Skin Submandibular abscess, left    Culture Results:   Moderate Alpha hemolytic strep POD/STATUS POST/ENT ISSUE: L submandibular abscess s/p I&D 4/16      INTERVAL HPI: 89 yo male seen and examined at bedside after admitted for IV abx s/p incision and drainage of left submandibular abscess in ED on 4/16. Pt admits to feeling much better today. Pt eating breakfast without difficulty, breathing comfortably on room air. Pt denies sore throat, SOB, hoarseness, fevers.  Culture positive for alpha hemolytic strep, vanco added per ID. Sensitivity pending.    Vital Signs Last 24 Hrs  T(C): 36.7 (19 Apr 2018 04:04), Max: 36.8 (18 Apr 2018 12:11)  T(F): 98.1 (19 Apr 2018 04:04), Max: 98.3 (18 Apr 2018 12:11)  HR: 73 (19 Apr 2018 04:04) (70 - 76)  BP: 116/70 (19 Apr 2018 04:04) (110/62 - 125/7)  BP(mean): --  RR: 18 (19 Apr 2018 04:04) (18 - 18)  SpO2: 97% (19 Apr 2018 04:04) (97% - 97%)    LABS:                        10.2   7.5   )-----------( 259      ( 18 Apr 2018 10:24 )             29.4     PHYSICAL EXAM:  Gen: NAD, well-developed  Head: Normocephalic, Atraumatic  Eyes: PERRL, EOMI, no scleral injection  Nose: Nares bilaterally patent, no discharge  Mouth: Mucosa moist, tongue/uvula midline, oropharynx clear  Neck: minimal serosanguinous drainage from left submandibular wound, mildly tender to palpation, DRSG and wound washed and  iodoform packing changed, trachea midline, no masses  Resp:  no stridor  CV: no peripheral edema/cyanosis    Culture - Other (04.16.18 @ 20:43)    Specimen Source: Skin Submandibular abscess, left    Culture Results:   Moderate Alpha hemolytic strep

## 2018-04-19 NOTE — DISCHARGE NOTE ADULT - MEDICATION SUMMARY - MEDICATIONS TO TAKE
I will START or STAY ON the medications listed below when I get home from the hospital:    atorvastatin 20 mg oral tablet  -- 1 tab(s) by mouth once a day (at bedtime)  -- Indication: For High cholesterol    Xtandi  -- 160 milligram(s) by mouth once a day  -- Indication: For prostate CA    Lupron  --  intramuscular every 3 months  -- Indication: For prostate CA    amLODIPine 5 mg oral tablet  -- 1 tab(s) by mouth once a day  -- Indication: For High BP    docusate sodium 100 mg oral capsule  -- 1 cap(s) by mouth 3 times a day  -- Indication: For Constipation    clindamycin 300 mg oral capsule  -- 1 cap(s) by mouth every 6 hours until 5/7/2018  -- Finish all this medication unless otherwise directed by prescriber.  Medication should be taken with plenty of water.    -- Indication: For abscess

## 2018-04-19 NOTE — DISCHARGE NOTE ADULT - CARE PROVIDER_API CALL
Miroslava Roberson), Otolaryngology  [address] [place] [id number]  Phone: [phone]  Fax: [phone]

## 2018-04-19 NOTE — DISCHARGE NOTE ADULT - ABILITY TO HEAR (WITH HEARING AID OR HEARING APPLIANCE IF NORMALLY USED):
Mildly to Moderately Impaired: difficulty hearing in some environments or speaker may need to increase volume or speak distinctly/bilateral [place]

## 2018-04-19 NOTE — PROGRESS NOTE ADULT - SUBJECTIVE AND OBJECTIVE BOX
Infectious Diseases progress note:    Subjective: Feeling better.  No fevers/chills.  Seen by ENT.  Daughter at bedside    ROS:  CONSTITUTIONAL:  No fever, chills, rigors  CARDIOVASCULAR:  No chest pain or palpitations  RESPIRATORY:   No SOB, cough, dyspnea on exertion.  No wheezing  GASTROINTESTINAL:  No abd pain, N/V, diarrhea/constipation  EXTREMITIES:  No swelling or joint pain  GENITOURINARY:  No burning on urination, increased frequency or urgency.  No flank pain  NEUROLOGIC:  No HA, visual disturbances  SKIN: No rashes    Allergies    No Known Allergies    Intolerances        ANTIBIOTICS/RELEVANT:  antimicrobials  clindamycin IVPB 600 milliGRAM(s) IV Intermittent every 8 hours  vancomycin  IVPB 1000 milliGRAM(s) IV Intermittent every 24 hours    immunologic:    OTHER:  amLODIPine   Tablet 5 milliGRAM(s) Oral daily  atorvastatin 20 milliGRAM(s) Oral at bedtime  sodium chloride 0.9% lock flush 3 milliLiter(s) IV Push every 8 hours  sodium chloride 0.9%. 1000 milliLiter(s) IV Continuous <Continuous>      Objective:  Vital Signs Last 24 Hrs  T(C): 36.8 (19 Apr 2018 12:10), Max: 36.8 (18 Apr 2018 20:35)  T(F): 98.2 (19 Apr 2018 12:10), Max: 98.2 (18 Apr 2018 20:35)  HR: 79 (19 Apr 2018 12:10) (73 - 79)  BP: 120/78 (19 Apr 2018 12:10) (116/70 - 125/7)  BP(mean): --  RR: 18 (19 Apr 2018 12:10) (18 - 18)  SpO2: 97% (19 Apr 2018 12:10) (97% - 97%)    PHYSICAL EXAM:  Constitutional:NAD  Eyes:MIRTA, EOMI  Ear/Nose/Throat: no thrush, mucositis.  Moist mucous membranes	  Neck:no JVD, no lymphadenopathy, supple  Respiratory: CTA regan  Cardiovascular: S1S2 RRR, no murmurs  Gastrointestinal:soft, nontender,  nondistended (+) BS  Extremities:no e/e/c  Skin:  L submandibular swelling        LABS:                        9.5    8.69  )-----------( 270      ( 19 Apr 2018 09:28 )             28.1     04-19    132<L>  |  99  |  17  ----------------------------<  100<H>  4.2   |  22  |  0.88    Ca    8.6      19 Apr 2018 07:17          MICROBIOLOGY:    Culture - Other (04.16.18 @ 20:43)    Specimen Source: Skin Submandibular abscess, left    Culture Results:   Moderate Alpha hemolytic strep    Culture - Blood (04.16.18 @ 16:56)    Specimen Source: .Blood Blood-Peripheral    Culture Results:   No growth to date.    Culture - Blood (04.16.18 @ 16:56)    Specimen Source: .Blood Blood-Peripheral    Culture Results:   No growth to date.          RADIOLOGY & ADDITIONAL STUDIES:

## 2018-04-19 NOTE — PROGRESS NOTE ADULT - ATTENDING COMMENTS
Wound cavity much smaller and significant decrease in discharge. Once ID switches to PO regimen patient can be discharged home. Please call before discharge and we will remove packing. He should keep area clean and dry with a clean gauze and paper tape to absorb any discharge. should be changed bid. He can f/u in office next week. Patient should follow up in ENT office as an outpatient. May see Dr. Roberson or Gonsalo. Call [phone].
left SMG abscess repacked this am. still draining copiously. recommend continued inpatient care for wound management. will repack this evening and again in am. if decreased drainage tomorrow will consider d/c home tomorrow with close outpatient f/u

## 2018-04-19 NOTE — DISCHARGE NOTE ADULT - CARE PLAN
Principal Discharge DX:	Submandibular abscess  Goal:	abscess resolves  Assessment and plan of treatment:	complete antibiotics  keep area clean and dry with clean gauze and paper tape, change 2 x daily  - f/u outpatient next week with Dr. Roberson/Gonsalo [phone].  Secondary Diagnosis:	History of prostate cancer  Assessment and plan of treatment:	continue current treatment  Secondary Diagnosis:	Essential hypertension  Assessment and plan of treatment:	Low salt diet  Activity as tolerated.  Take all medication as prescribed.  Follow up with your medical doctor for routine blood pressure monitoring at your next visit.  Notify your doctor if you have any of the following symptoms:   Dizziness, Lightheadedness, Blurry vision, Headache, Chest pain, Shortness of breath  Secondary Diagnosis:	HLD (hyperlipidemia)  Assessment and plan of treatment:	Low fat diet  continue lipitor

## 2018-04-19 NOTE — DISCHARGE NOTE ADULT - PATIENT PORTAL LINK FT
You can access the [facility] Patient Portal, offered by [facility], by registering with the following website: [email or web address]

## 2018-04-19 NOTE — DISCHARGE NOTE ADULT - CONDITIONS AT DISCHARGE
Alert and oriented  X 4. Skin color good warm and dry to touch. Respirations regular and unlabored. Denies pain or discomfort. Chin dressing clean dry and intact. Appetite good at meals. Out of bed tolerated well. No distress noted at time of discharge.

## 2018-04-19 NOTE — PROGRESS NOTE ADULT - ASSESSMENT
Patient is a 89y old male with HTN, HLD, hx prostate ca, sent in by Dr. Perlman ( Primary ENT) for progressive Left submandibular swelling, and pain a/w difficulty swallowing, pt reports discomfort while sleeping increasing Left neck pain. Pt was being treated with Augmentin PO, with no resolution of symptoms. Pt seen & evaluated  by Dr Perlman this AM, advised to come to ED for further evaluation Imaging ( Neck CT to r/o Abscess), and IV ABX     Laryngoscopy by Dr Perlman today within normal limits, no airway edema or involvement. Pt denies dysphagia/f/c/voice change/drooling/recent travel/weight loss/neck pain/    Problem/Plan - 1:    Submandibular abscess    - s/p I&D and cultures sent - final cultures reported as alpha hemolytic strep (i.e. strep milleri group)    - Agree with continuing clindamycin for gram positive and anerobic coverage.  Will d/c vancomycin    - Cont wound care, packing in place.  ENT following    - Pt clinically improving.  Can change to PO clindamycin 300mg po qid through May 7th (to complete 3 week course for submandibular abscess).  Plan for D/C in AM    d/w patient and daughter at bedside      Will follow,    Shanika Espinoza  [phone]

## 2018-04-19 NOTE — DISCHARGE NOTE ADULT - PLAN OF CARE
abscess resolves complete antibiotics  keep area clean and dry with clean gauze and paper tape, change 2 x daily  - f/u outpatient next week with Dr. Roberson/Gonsalo [phone]. continue current treatment Low salt diet  Activity as tolerated.  Take all medication as prescribed.  Follow up with your medical doctor for routine blood pressure monitoring at your next visit.  Notify your doctor if you have any of the following symptoms:   Dizziness, Lightheadedness, Blurry vision, Headache, Chest pain, Shortness of breath Low fat diet  continue lipitor

## 2018-04-19 NOTE — PROGRESS NOTE ADULT - ASSESSMENT
88 yo male s/p incision and drainage of left submandibular abscess in ED 4/16. WBC 7.5, trending down, afebrile. Drained left submandibular abscess iodoform packing changed, saturated gauze replaced with new pressure dressing.

## 2018-04-19 NOTE — PROGRESS NOTE ADULT - PROBLEM SELECTOR PLAN 1
Iodoform packing changed, pressure dressing changed  Continue with medications  continue with ID spring Iodoform packing changed, pressure dressing changed  Continue with medications  Pt now on vanco per ID recommendations

## 2018-04-20 VITALS
RESPIRATION RATE: 18 BRPM | OXYGEN SATURATION: 98 % | TEMPERATURE: 98 F | HEART RATE: 74 BPM | DIASTOLIC BLOOD PRESSURE: 77 MMHG | SYSTOLIC BLOOD PRESSURE: 114 MMHG

## 2018-04-20 LAB
ANION GAP SERPL CALC-SCNC: 10 MMOL/L — SIGNIFICANT CHANGE UP (ref 5–17)
BUN SERPL-MCNC: 15 MG/DL — SIGNIFICANT CHANGE UP (ref 7–23)
CALCIUM SERPL-MCNC: 8.7 MG/DL — SIGNIFICANT CHANGE UP (ref 8.4–10.5)
CHLORIDE SERPL-SCNC: 102 MMOL/L — SIGNIFICANT CHANGE UP (ref 96–108)
CO2 SERPL-SCNC: 22 MMOL/L — SIGNIFICANT CHANGE UP (ref 22–31)
CREAT SERPL-MCNC: 0.91 MG/DL — SIGNIFICANT CHANGE UP (ref 0.5–1.3)
GLUCOSE SERPL-MCNC: 103 MG/DL — HIGH (ref 70–99)
HCT VFR BLD CALC: 29 % — LOW (ref 39–50)
HGB BLD-MCNC: 9.6 G/DL — LOW (ref 13–17)
MCHC RBC-ENTMCNC: 29.4 PG — SIGNIFICANT CHANGE UP (ref 27–34)
MCHC RBC-ENTMCNC: 33.1 GM/DL — SIGNIFICANT CHANGE UP (ref 32–36)
MCV RBC AUTO: 89 FL — SIGNIFICANT CHANGE UP (ref 80–100)
PLATELET # BLD AUTO: 293 K/UL — SIGNIFICANT CHANGE UP (ref 150–400)
POTASSIUM SERPL-MCNC: 4.4 MMOL/L — SIGNIFICANT CHANGE UP (ref 3.5–5.3)
POTASSIUM SERPL-SCNC: 4.4 MMOL/L — SIGNIFICANT CHANGE UP (ref 3.5–5.3)
RBC # BLD: 3.26 M/UL — LOW (ref 4.2–5.8)
RBC # FLD: 12.7 % — SIGNIFICANT CHANGE UP (ref 10.3–14.5)
SODIUM SERPL-SCNC: 134 MMOL/L — LOW (ref 135–145)
WBC # BLD: 7.94 K/UL — SIGNIFICANT CHANGE UP (ref 3.8–10.5)
WBC # FLD AUTO: 7.94 K/UL — SIGNIFICANT CHANGE UP (ref 3.8–10.5)

## 2018-04-20 PROCEDURE — 96376 TX/PRO/DX INJ SAME DRUG ADON: CPT | Mod: XU

## 2018-04-20 PROCEDURE — 85027 COMPLETE CBC AUTOMATED: CPT

## 2018-04-20 PROCEDURE — 80053 COMPREHEN METABOLIC PANEL: CPT

## 2018-04-20 PROCEDURE — 99285 EMERGENCY DEPT VISIT HI MDM: CPT | Mod: 25

## 2018-04-20 PROCEDURE — 85610 PROTHROMBIN TIME: CPT

## 2018-04-20 PROCEDURE — G0378: CPT

## 2018-04-20 PROCEDURE — 99024 POSTOP FOLLOW-UP VISIT: CPT

## 2018-04-20 PROCEDURE — 93005 ELECTROCARDIOGRAM TRACING: CPT | Mod: XU

## 2018-04-20 PROCEDURE — 80202 ASSAY OF VANCOMYCIN: CPT

## 2018-04-20 PROCEDURE — 96374 THER/PROPH/DIAG INJ IV PUSH: CPT | Mod: XU

## 2018-04-20 PROCEDURE — 85730 THROMBOPLASTIN TIME PARTIAL: CPT

## 2018-04-20 PROCEDURE — 41008 DRAINAGE OF MOUTH LESION: CPT

## 2018-04-20 PROCEDURE — 87070 CULTURE OTHR SPECIMN AEROBIC: CPT

## 2018-04-20 PROCEDURE — 31505 DIAGNOSTIC LARYNGOSCOPY: CPT

## 2018-04-20 PROCEDURE — 80048 BASIC METABOLIC PNL TOTAL CA: CPT

## 2018-04-20 PROCEDURE — 87040 BLOOD CULTURE FOR BACTERIA: CPT

## 2018-04-20 PROCEDURE — 70491 CT SOFT TISSUE NECK W/DYE: CPT

## 2018-04-20 RX ADMIN — AMLODIPINE BESYLATE 5 MILLIGRAM(S): 2.5 TABLET ORAL at 06:35

## 2018-04-20 RX ADMIN — Medication 100 MILLIGRAM(S): at 06:35

## 2018-04-20 RX ADMIN — SODIUM CHLORIDE 3 MILLILITER(S): 9 INJECTION INTRAMUSCULAR; INTRAVENOUS; SUBCUTANEOUS at 06:29

## 2018-04-20 NOTE — PROGRESS NOTE ADULT - SUBJECTIVE AND OBJECTIVE BOX
ENT ISSUE: L submandibular abscess s/p I&D 4/16    HPI: 88 yo male admitted for IV abx s/p incision and drainage of left submandibular abscess in ED on 4/16. Pt seen and examined at bedside. No acute events overnight. Pt states great improvement today. Pt denies fever, chills, n/v, HA, SOB, dysphagia, odynophagia, hoarseness. WBC stable. Culture positive for alpha hemolytic strep, sensitivity pending, Vanco added per ID. Pt planning to be discharged today.      Vital Signs Last 24 Hrs  T(C): 36.8 (20 Apr 2018 04:19), Max: 36.9 (19 Apr 2018 21:24)  T(F): 98.2 (20 Apr 2018 04:19), Max: 98.4 (19 Apr 2018 21:24)  HR: 77 (20 Apr 2018 04:19) (71 - 79)  BP: 122/70 (20 Apr 2018 04:19) (120/78 - 124/78)  BP(mean): --  RR: 17 (20 Apr 2018 04:19) (17 - 18)  SpO2: 99% (20 Apr 2018 04:19) (95% - 99%)    LABS:                        9.6    7.94  )-----------( 293      ( 20 Apr 2018 07:58 )             29.0         PHYSICAL EXAM:  Gen: NAD, well-developed  Head: Normocephalic, Atraumatic  Eyes: PERRL, EOMI, no scleral injection  Nose: Nares bilaterally patent, no discharge  Mouth: Mucosa moist, tongue/uvula midline, oropharynx clear  Neck: iodoform packing removed, no serosanguinous drainage from left submandibular wound, nontender to palpation, no erythema or edema, dry gauze dressing in place, trachea midline, no masses  Resp:  no stridor  CV: no peripheral edema/cyanosis

## 2018-04-20 NOTE — PROGRESS NOTE ADULT - PROBLEM SELECTOR PLAN 1
- continue ABx PO per ID recs  - keep area clean and dry with clean gauze and paper tape, change bid  - f/u outpt next week with Dr. Roberson/Gonsalo [phone]

## 2018-04-20 NOTE — PROGRESS NOTE ADULT - ASSESSMENT
88yo male s/p incision and drainage of left submandibular abscess on 4/16. Pt doing well, wound cavity minimal, no drainage noted. Packing removed and dry gauze dressing in place. Pt planning for discharge today.

## 2018-04-20 NOTE — PROGRESS NOTE ADULT - ASSESSMENT
88 yo male h/o prstate ca, htn, chol, a/w left submandibular cellulitis with abscess    s/p I&D by ENT  wound care  ent f/u apprec  cult noted  ID f/u appred  change to po clinda for dc home today  pain control    cont home meds    dvt ppx    dc home today. outpt ENT f/u

## 2018-04-21 LAB
CULTURE RESULTS: SIGNIFICANT CHANGE UP
CULTURE RESULTS: SIGNIFICANT CHANGE UP
SPECIMEN SOURCE: SIGNIFICANT CHANGE UP
SPECIMEN SOURCE: SIGNIFICANT CHANGE UP

## 2018-04-23 PROBLEM — M81.0 AGE-RELATED OSTEOPOROSIS WITHOUT CURRENT PATHOLOGICAL FRACTURE: Chronic | Status: ACTIVE | Noted: 2018-04-16

## 2018-04-23 PROBLEM — E78.5 HYPERLIPIDEMIA, UNSPECIFIED: Chronic | Status: ACTIVE | Noted: 2018-04-16

## 2018-04-27 ENCOUNTER — APPOINTMENT (OUTPATIENT)
Dept: GERIATRICS | Facility: CLINIC | Age: 83
End: 2018-04-27
Payer: COMMERCIAL

## 2018-04-27 ENCOUNTER — APPOINTMENT (OUTPATIENT)
Dept: ENDOCRINOLOGY | Facility: CLINIC | Age: 83
End: 2018-04-27
Payer: COMMERCIAL

## 2018-04-27 VITALS
SYSTOLIC BLOOD PRESSURE: 130 MMHG | RESPIRATION RATE: 18 BRPM | OXYGEN SATURATION: 93 % | HEART RATE: 74 BPM | DIASTOLIC BLOOD PRESSURE: 70 MMHG | HEIGHT: 67.2 IN | WEIGHT: 155.2 LBS | BODY MASS INDEX: 24.08 KG/M2

## 2018-04-27 VITALS
WEIGHT: 156 LBS | HEART RATE: 66 BPM | HEIGHT: 66 IN | DIASTOLIC BLOOD PRESSURE: 66 MMHG | OXYGEN SATURATION: 93 % | BODY MASS INDEX: 25.07 KG/M2 | SYSTOLIC BLOOD PRESSURE: 126 MMHG

## 2018-04-27 DIAGNOSIS — K57.90 DIVERTICULOSIS OF INTESTINE, PART UNSPECIFIED, W/OUT PERFORATION OR ABSCESS W/OUT BLEEDING: ICD-10-CM

## 2018-04-27 PROCEDURE — 96372 THER/PROPH/DIAG INJ SC/IM: CPT

## 2018-04-27 PROCEDURE — 99215 OFFICE O/P EST HI 40 MIN: CPT

## 2018-04-27 PROCEDURE — 99214 OFFICE O/P EST MOD 30 MIN: CPT | Mod: 25

## 2018-04-27 RX ORDER — DENOSUMAB 60 MG/ML
60 INJECTION SUBCUTANEOUS
Qty: 0 | Refills: 0 | Status: COMPLETED | OUTPATIENT
Start: 2018-04-27

## 2018-04-27 RX ADMIN — DENOSUMAB 0 MG/ML: 60 INJECTION SUBCUTANEOUS at 00:00

## 2018-10-15 ENCOUNTER — MEDICATION RENEWAL (OUTPATIENT)
Age: 83
End: 2018-10-15

## 2018-11-01 ENCOUNTER — APPOINTMENT (OUTPATIENT)
Dept: ENDOCRINOLOGY | Facility: CLINIC | Age: 83
End: 2018-11-01
Payer: COMMERCIAL

## 2018-11-01 VITALS
SYSTOLIC BLOOD PRESSURE: 130 MMHG | HEIGHT: 67 IN | WEIGHT: 162 LBS | DIASTOLIC BLOOD PRESSURE: 70 MMHG | OXYGEN SATURATION: 98 % | BODY MASS INDEX: 25.43 KG/M2 | HEART RATE: 79 BPM

## 2018-11-01 PROCEDURE — 99214 OFFICE O/P EST MOD 30 MIN: CPT | Mod: 25

## 2018-11-01 PROCEDURE — 96401 CHEMO ANTI-NEOPL SQ/IM: CPT

## 2018-11-01 PROCEDURE — ZZZZZ: CPT

## 2018-11-01 PROCEDURE — 77080 DXA BONE DENSITY AXIAL: CPT

## 2018-11-01 RX ORDER — AMOXICILLIN AND CLAVULANATE POTASSIUM 875; 125 MG/1; MG/1
875-125 TABLET, COATED ORAL
Qty: 20 | Refills: 0 | Status: DISCONTINUED | COMMUNITY
Start: 2018-03-28 | End: 2018-11-01

## 2018-11-01 RX ORDER — DENOSUMAB 60 MG/ML
60 INJECTION SUBCUTANEOUS
Qty: 1 | Refills: 0 | Status: COMPLETED | OUTPATIENT
Start: 2018-11-01

## 2018-11-01 RX ORDER — CLINDAMYCIN HYDROCHLORIDE 300 MG/1
300 CAPSULE ORAL
Qty: 68 | Refills: 0 | Status: DISCONTINUED | COMMUNITY
Start: 2018-04-20 | End: 2018-11-01

## 2018-11-01 RX ADMIN — DENOSUMAB 0 MG/ML: 60 INJECTION SUBCUTANEOUS at 00:00

## 2018-11-19 NOTE — ED PROVIDER NOTE - NSTIMEPROVIDERCAREINITIATE_GEN_ER
16-Apr-2018 13:18 Manual Repair Warning Statement: We plan on removing the manually selected variable below in favor of our much easier automatic structured text blocks found in the previous tab. We decided to do this to help make the flow better and give you the full power of structured data. Manual selection is never going to be ideal in our platform and I would encourage you to avoid using manual selection from this point on, especially since I will be sunsetting this feature. It is important that you do one of two things with the customized text below. First, you can save all of the text in a word file so you can have it for future reference. Second, transfer the text to the appropriate area in the Library tab. Lastly, if there is a flap or graft type which we do not have you need to let us know right away so I can add it in before the variable is hidden. No need to panic, we plan to give you roughly 6 months to make the change.

## 2018-12-10 ENCOUNTER — APPOINTMENT (OUTPATIENT)
Dept: GERIATRICS | Facility: CLINIC | Age: 83
End: 2018-12-10

## 2018-12-24 ENCOUNTER — APPOINTMENT (OUTPATIENT)
Dept: DERMATOLOGY | Facility: CLINIC | Age: 83
End: 2018-12-24
Payer: COMMERCIAL

## 2018-12-24 ENCOUNTER — LABORATORY RESULT (OUTPATIENT)
Age: 83
End: 2018-12-24

## 2018-12-24 DIAGNOSIS — D48.5 NEOPLASM OF UNCERTAIN BEHAVIOR OF SKIN: ICD-10-CM

## 2018-12-24 DIAGNOSIS — L85.3 XEROSIS CUTIS: ICD-10-CM

## 2018-12-24 DIAGNOSIS — L82.1 OTHER SEBORRHEIC KERATOSIS: ICD-10-CM

## 2018-12-24 DIAGNOSIS — Z12.83 ENCOUNTER FOR SCREENING FOR MALIGNANT NEOPLASM OF SKIN: ICD-10-CM

## 2018-12-24 DIAGNOSIS — D18.01 HEMANGIOMA OF SKIN AND SUBCUTANEOUS TISSUE: ICD-10-CM

## 2018-12-24 PROCEDURE — 11100 BX SKIN SUBCUTANEOUS&/MUCOUS MEMBRANE 1 LESION: CPT

## 2018-12-24 PROCEDURE — 99204 OFFICE O/P NEW MOD 45 MIN: CPT | Mod: 25

## 2018-12-26 ENCOUNTER — MEDICATION RENEWAL (OUTPATIENT)
Age: 83
End: 2018-12-26

## 2018-12-31 ENCOUNTER — MESSAGE (OUTPATIENT)
Age: 83
End: 2018-12-31

## 2019-01-01 ENCOUNTER — APPOINTMENT (OUTPATIENT)
Dept: ENDOCRINOLOGY | Facility: CLINIC | Age: 84
End: 2019-01-01
Payer: MEDICARE

## 2019-01-01 ENCOUNTER — APPOINTMENT (OUTPATIENT)
Dept: ORTHOPEDIC SURGERY | Facility: CLINIC | Age: 84
End: 2019-01-01
Payer: MEDICARE

## 2019-01-01 ENCOUNTER — APPOINTMENT (OUTPATIENT)
Dept: GERIATRICS | Facility: CLINIC | Age: 84
End: 2019-01-01
Payer: MEDICARE

## 2019-01-01 VITALS
OXYGEN SATURATION: 90 % | DIASTOLIC BLOOD PRESSURE: 58 MMHG | BODY MASS INDEX: 24.11 KG/M2 | SYSTOLIC BLOOD PRESSURE: 98 MMHG | HEART RATE: 86 BPM | HEIGHT: 66.3 IN | WEIGHT: 150 LBS

## 2019-01-01 VITALS
HEIGHT: 60 IN | RESPIRATION RATE: 15 BRPM | DIASTOLIC BLOOD PRESSURE: 60 MMHG | WEIGHT: 147.5 LBS | TEMPERATURE: 98.7 F | OXYGEN SATURATION: 96 % | BODY MASS INDEX: 28.96 KG/M2 | HEART RATE: 84 BPM | SYSTOLIC BLOOD PRESSURE: 90 MMHG

## 2019-01-01 DIAGNOSIS — L40.9 PSORIASIS, UNSPECIFIED: ICD-10-CM
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
## 2019-01-01 PROCEDURE — 77080 DXA BONE DENSITY AXIAL: CPT | Mod: GA

## 2019-01-01 PROCEDURE — 99024 POSTOP FOLLOW-UP VISIT: CPT

## 2019-01-01 PROCEDURE — 96372 THER/PROPH/DIAG INJ SC/IM: CPT

## 2019-01-01 PROCEDURE — 73502 X-RAY EXAM HIP UNI 2-3 VIEWS: CPT | Mod: LT

## 2019-01-01 PROCEDURE — ZZZZZ: CPT

## 2019-01-01 PROCEDURE — 99214 OFFICE O/P EST MOD 30 MIN: CPT | Mod: 25

## 2019-01-01 PROCEDURE — 99215 OFFICE O/P EST HI 40 MIN: CPT | Mod: GC

## 2019-01-01 RX ORDER — METOPROLOL SUCCINATE 25 MG/1
25 TABLET, EXTENDED RELEASE ORAL
Refills: 0 | Status: ACTIVE | COMMUNITY
Start: 2019-01-01

## 2019-01-01 RX ORDER — PNV NO.95/FERROUS FUM/FOLIC AC 28MG-0.8MG
TABLET ORAL
Refills: 0 | Status: ACTIVE | COMMUNITY

## 2019-01-01 RX ORDER — METOPROLOL TARTRATE 75 MG/1
TABLET, FILM COATED ORAL
Refills: 0 | Status: DISCONTINUED | COMMUNITY
End: 2019-01-01

## 2019-01-01 RX ORDER — DENOSUMAB 60 MG/ML
60 INJECTION SUBCUTANEOUS
Qty: 1 | Refills: 0 | Status: COMPLETED | OUTPATIENT
Start: 2019-01-01

## 2019-01-01 RX ORDER — HYDROCORTISONE 1 %
12 CREAM (GRAM) TOPICAL TWICE DAILY
Qty: 1 | Refills: 11 | Status: DISCONTINUED | COMMUNITY
Start: 2018-12-24 | End: 2019-01-01

## 2019-01-01 RX ORDER — AMLODIPINE BESYLATE AND BENAZEPRIL HYDROCHLORIDE 5; 10 MG/1; MG/1
5-10 CAPSULE ORAL
Refills: 0 | Status: ACTIVE | COMMUNITY
Start: 2019-01-01

## 2019-01-01 RX ADMIN — DENOSUMAB 60 MG/ML: 60 INJECTION SUBCUTANEOUS at 00:00

## 2019-02-08 ENCOUNTER — APPOINTMENT (OUTPATIENT)
Dept: DERMATOLOGY | Facility: CLINIC | Age: 84
End: 2019-02-08
Payer: COMMERCIAL

## 2019-02-08 PROCEDURE — 17263 DSTRJ MAL LES T/A/L 2.1-3.0: CPT

## 2019-03-22 ENCOUNTER — APPOINTMENT (OUTPATIENT)
Dept: DERMATOLOGY | Facility: CLINIC | Age: 84
End: 2019-03-22
Payer: COMMERCIAL

## 2019-03-22 DIAGNOSIS — C44.519 BASAL CELL CARCINOMA OF SKIN OF OTHER PART OF TRUNK: ICD-10-CM

## 2019-03-22 PROCEDURE — 99213 OFFICE O/P EST LOW 20 MIN: CPT | Mod: 25

## 2019-03-22 PROCEDURE — 17003 DESTRUCT PREMALG LES 2-14: CPT

## 2019-03-22 PROCEDURE — 17000 DESTRUCT PREMALG LESION: CPT

## 2019-05-09 ENCOUNTER — APPOINTMENT (OUTPATIENT)
Dept: ENDOCRINOLOGY | Facility: CLINIC | Age: 84
End: 2019-05-09

## 2019-05-10 ENCOUNTER — APPOINTMENT (OUTPATIENT)
Dept: ENDOCRINOLOGY | Facility: CLINIC | Age: 84
End: 2019-05-10
Payer: COMMERCIAL

## 2019-05-10 PROCEDURE — 99212 OFFICE O/P EST SF 10 MIN: CPT | Mod: 25

## 2019-05-10 PROCEDURE — 96401 CHEMO ANTI-NEOPL SQ/IM: CPT

## 2019-08-20 ENCOUNTER — INPATIENT (INPATIENT)
Facility: HOSPITAL | Age: 84
LOS: 2 days | Discharge: NURSING, REHAB OR LONG-TERM CARE | DRG: 481 | End: 2019-08-23
Attending: SPECIALIST | Admitting: SPECIALIST
Payer: MEDICARE

## 2019-08-20 ENCOUNTER — TRANSCRIPTION ENCOUNTER (OUTPATIENT)
Age: 84
End: 2019-08-20

## 2019-08-20 VITALS
HEART RATE: 77 BPM | DIASTOLIC BLOOD PRESSURE: 78 MMHG | RESPIRATION RATE: 18 BRPM | SYSTOLIC BLOOD PRESSURE: 160 MMHG | OXYGEN SATURATION: 95 % | HEIGHT: 71 IN | WEIGHT: 179.9 LBS | TEMPERATURE: 98 F

## 2019-08-20 DIAGNOSIS — S72.002A FRACTURE OF UNSPECIFIED PART OF NECK OF LEFT FEMUR, INITIAL ENCOUNTER FOR CLOSED FRACTURE: ICD-10-CM

## 2019-08-20 LAB
ALBUMIN SERPL ELPH-MCNC: 4.2 G/DL — SIGNIFICANT CHANGE UP (ref 3.3–5)
ALP SERPL-CCNC: 49 U/L — SIGNIFICANT CHANGE UP (ref 40–120)
ALT FLD-CCNC: 14 U/L — SIGNIFICANT CHANGE UP (ref 10–45)
ANION GAP SERPL CALC-SCNC: 12 MMOL/L — SIGNIFICANT CHANGE UP (ref 5–17)
APPEARANCE UR: CLEAR — SIGNIFICANT CHANGE UP
APTT BLD: 24.8 SEC — LOW (ref 27.5–36.3)
AST SERPL-CCNC: 16 U/L — SIGNIFICANT CHANGE UP (ref 10–40)
BASOPHILS # BLD AUTO: 0 K/UL — SIGNIFICANT CHANGE UP (ref 0–0.2)
BASOPHILS NFR BLD AUTO: 0.4 % — SIGNIFICANT CHANGE UP (ref 0–2)
BILIRUB SERPL-MCNC: 0.4 MG/DL — SIGNIFICANT CHANGE UP (ref 0.2–1.2)
BILIRUB UR-MCNC: NEGATIVE — SIGNIFICANT CHANGE UP
BLD GP AB SCN SERPL QL: NEGATIVE — SIGNIFICANT CHANGE UP
BUN SERPL-MCNC: 20 MG/DL — SIGNIFICANT CHANGE UP (ref 7–23)
CALCIUM SERPL-MCNC: 9.6 MG/DL — SIGNIFICANT CHANGE UP (ref 8.4–10.5)
CHLORIDE SERPL-SCNC: 104 MMOL/L — SIGNIFICANT CHANGE UP (ref 96–108)
CO2 SERPL-SCNC: 22 MMOL/L — SIGNIFICANT CHANGE UP (ref 22–31)
COLOR SPEC: YELLOW — SIGNIFICANT CHANGE UP
CREAT SERPL-MCNC: 1 MG/DL — SIGNIFICANT CHANGE UP (ref 0.5–1.3)
DIFF PNL FLD: NEGATIVE — SIGNIFICANT CHANGE UP
EOSINOPHIL # BLD AUTO: 0.3 K/UL — SIGNIFICANT CHANGE UP (ref 0–0.5)
EOSINOPHIL NFR BLD AUTO: 3.5 % — SIGNIFICANT CHANGE UP (ref 0–6)
GLUCOSE SERPL-MCNC: 127 MG/DL — HIGH (ref 70–99)
GLUCOSE UR QL: NEGATIVE — SIGNIFICANT CHANGE UP
HCT VFR BLD CALC: 36.2 % — LOW (ref 39–50)
HGB BLD-MCNC: 11.7 G/DL — LOW (ref 13–17)
INR BLD: 0.94 RATIO — SIGNIFICANT CHANGE UP (ref 0.88–1.16)
KETONES UR-MCNC: ABNORMAL
LEUKOCYTE ESTERASE UR-ACNC: NEGATIVE — SIGNIFICANT CHANGE UP
LYMPHOCYTES # BLD AUTO: 1.3 K/UL — SIGNIFICANT CHANGE UP (ref 1–3.3)
LYMPHOCYTES # BLD AUTO: 15.4 % — SIGNIFICANT CHANGE UP (ref 13–44)
MCHC RBC-ENTMCNC: 29.4 PG — SIGNIFICANT CHANGE UP (ref 27–34)
MCHC RBC-ENTMCNC: 32.4 GM/DL — SIGNIFICANT CHANGE UP (ref 32–36)
MCV RBC AUTO: 90.7 FL — SIGNIFICANT CHANGE UP (ref 80–100)
MONOCYTES # BLD AUTO: 0.8 K/UL — SIGNIFICANT CHANGE UP (ref 0–0.9)
MONOCYTES NFR BLD AUTO: 9.5 % — SIGNIFICANT CHANGE UP (ref 2–14)
NEUTROPHILS # BLD AUTO: 5.9 K/UL — SIGNIFICANT CHANGE UP (ref 1.8–7.4)
NEUTROPHILS NFR BLD AUTO: 71.2 % — SIGNIFICANT CHANGE UP (ref 43–77)
NITRITE UR-MCNC: NEGATIVE — SIGNIFICANT CHANGE UP
PH UR: 6 — SIGNIFICANT CHANGE UP (ref 5–8)
PLATELET # BLD AUTO: 230 K/UL — SIGNIFICANT CHANGE UP (ref 150–400)
POTASSIUM SERPL-MCNC: 4.5 MMOL/L — SIGNIFICANT CHANGE UP (ref 3.5–5.3)
POTASSIUM SERPL-SCNC: 4.5 MMOL/L — SIGNIFICANT CHANGE UP (ref 3.5–5.3)
PROT SERPL-MCNC: 6.2 G/DL — SIGNIFICANT CHANGE UP (ref 6–8.3)
PROT UR-MCNC: ABNORMAL
PROTHROM AB SERPL-ACNC: 10.8 SEC — SIGNIFICANT CHANGE UP (ref 10–12.9)
RBC # BLD: 3.99 M/UL — LOW (ref 4.2–5.8)
RBC # FLD: 11.6 % — SIGNIFICANT CHANGE UP (ref 10.3–14.5)
RH IG SCN BLD-IMP: POSITIVE — SIGNIFICANT CHANGE UP
SODIUM SERPL-SCNC: 138 MMOL/L — SIGNIFICANT CHANGE UP (ref 135–145)
SP GR SPEC: 1.02 — SIGNIFICANT CHANGE UP (ref 1.01–1.02)
UROBILINOGEN FLD QL: NORMAL — SIGNIFICANT CHANGE UP
WBC # BLD: 8.3 K/UL — SIGNIFICANT CHANGE UP (ref 3.8–10.5)
WBC # FLD AUTO: 8.3 K/UL — SIGNIFICANT CHANGE UP (ref 3.8–10.5)

## 2019-08-20 PROCEDURE — 73070 X-RAY EXAM OF ELBOW: CPT | Mod: 26,LT

## 2019-08-20 PROCEDURE — 71045 X-RAY EXAM CHEST 1 VIEW: CPT | Mod: 26

## 2019-08-20 PROCEDURE — 73120 X-RAY EXAM OF HAND: CPT | Mod: 26,LT

## 2019-08-20 PROCEDURE — 73502 X-RAY EXAM HIP UNI 2-3 VIEWS: CPT | Mod: 26,LT

## 2019-08-20 PROCEDURE — 99285 EMERGENCY DEPT VISIT HI MDM: CPT

## 2019-08-20 PROCEDURE — 73552 X-RAY EXAM OF FEMUR 2/>: CPT | Mod: 26,LT

## 2019-08-20 PROCEDURE — 93010 ELECTROCARDIOGRAM REPORT: CPT

## 2019-08-20 RX ORDER — TETANUS TOXOID, REDUCED DIPHTHERIA TOXOID AND ACELLULAR PERTUSSIS VACCINE, ADSORBED 5; 2.5; 8; 8; 2.5 [IU]/.5ML; [IU]/.5ML; UG/.5ML; UG/.5ML; UG/.5ML
0.5 SUSPENSION INTRAMUSCULAR ONCE
Refills: 0 | Status: COMPLETED | OUTPATIENT
Start: 2019-08-20 | End: 2019-08-20

## 2019-08-20 RX ORDER — MORPHINE SULFATE 50 MG/1
2 CAPSULE, EXTENDED RELEASE ORAL ONCE
Refills: 0 | Status: DISCONTINUED | OUTPATIENT
Start: 2019-08-20 | End: 2019-08-20

## 2019-08-20 RX ORDER — ACETAMINOPHEN 500 MG
975 TABLET ORAL ONCE
Refills: 0 | Status: COMPLETED | OUTPATIENT
Start: 2019-08-20 | End: 2019-08-20

## 2019-08-20 RX ORDER — MAGNESIUM HYDROXIDE 400 MG/1
30 TABLET, CHEWABLE ORAL DAILY
Refills: 0 | Status: DISCONTINUED | OUTPATIENT
Start: 2019-08-20 | End: 2019-08-21

## 2019-08-20 RX ORDER — DOCUSATE SODIUM 100 MG
100 CAPSULE ORAL THREE TIMES A DAY
Refills: 0 | Status: DISCONTINUED | OUTPATIENT
Start: 2019-08-20 | End: 2019-08-21

## 2019-08-20 RX ORDER — ASPIRIN/CALCIUM CARB/MAGNESIUM 324 MG
81 TABLET ORAL DAILY
Refills: 0 | Status: DISCONTINUED | OUTPATIENT
Start: 2019-08-20 | End: 2019-08-21

## 2019-08-20 RX ORDER — ACETAMINOPHEN 500 MG
975 TABLET ORAL EVERY 8 HOURS
Refills: 0 | Status: DISCONTINUED | OUTPATIENT
Start: 2019-08-20 | End: 2019-08-21

## 2019-08-20 RX ORDER — LISINOPRIL 2.5 MG/1
10 TABLET ORAL DAILY
Refills: 0 | Status: DISCONTINUED | OUTPATIENT
Start: 2019-08-20 | End: 2019-08-21

## 2019-08-20 RX ORDER — TRAMADOL HYDROCHLORIDE 50 MG/1
25 TABLET ORAL EVERY 8 HOURS
Refills: 0 | Status: DISCONTINUED | OUTPATIENT
Start: 2019-08-20 | End: 2019-08-21

## 2019-08-20 RX ORDER — OXYCODONE HYDROCHLORIDE 5 MG/1
2.5 TABLET ORAL EVERY 4 HOURS
Refills: 0 | Status: DISCONTINUED | OUTPATIENT
Start: 2019-08-20 | End: 2019-08-21

## 2019-08-20 RX ORDER — LANOLIN ALCOHOL/MO/W.PET/CERES
10 CREAM (GRAM) TOPICAL AT BEDTIME
Refills: 0 | Status: DISCONTINUED | OUTPATIENT
Start: 2019-08-20 | End: 2019-08-21

## 2019-08-20 RX ORDER — OXYCODONE HYDROCHLORIDE 5 MG/1
5 TABLET ORAL EVERY 4 HOURS
Refills: 0 | Status: DISCONTINUED | OUTPATIENT
Start: 2019-08-20 | End: 2019-08-21

## 2019-08-20 RX ORDER — SODIUM CHLORIDE 9 MG/ML
1000 INJECTION INTRAMUSCULAR; INTRAVENOUS; SUBCUTANEOUS
Refills: 0 | Status: DISCONTINUED | OUTPATIENT
Start: 2019-08-20 | End: 2019-08-21

## 2019-08-20 RX ORDER — ATORVASTATIN CALCIUM 80 MG/1
20 TABLET, FILM COATED ORAL AT BEDTIME
Refills: 0 | Status: DISCONTINUED | OUTPATIENT
Start: 2019-08-20 | End: 2019-08-21

## 2019-08-20 RX ORDER — SENNA PLUS 8.6 MG/1
2 TABLET ORAL AT BEDTIME
Refills: 0 | Status: DISCONTINUED | OUTPATIENT
Start: 2019-08-20 | End: 2019-08-21

## 2019-08-20 RX ORDER — AMLODIPINE BESYLATE 2.5 MG/1
5 TABLET ORAL DAILY
Refills: 0 | Status: DISCONTINUED | OUTPATIENT
Start: 2019-08-20 | End: 2019-08-21

## 2019-08-20 RX ADMIN — SENNA PLUS 2 TABLET(S): 8.6 TABLET ORAL at 22:45

## 2019-08-20 RX ADMIN — Medication 975 MILLIGRAM(S): at 17:36

## 2019-08-20 RX ADMIN — MORPHINE SULFATE 2 MILLIGRAM(S): 50 CAPSULE, EXTENDED RELEASE ORAL at 17:36

## 2019-08-20 RX ADMIN — Medication 10 MILLIGRAM(S): at 22:45

## 2019-08-20 RX ADMIN — TRAMADOL HYDROCHLORIDE 25 MILLIGRAM(S): 50 TABLET ORAL at 23:15

## 2019-08-20 RX ADMIN — TETANUS TOXOID, REDUCED DIPHTHERIA TOXOID AND ACELLULAR PERTUSSIS VACCINE, ADSORBED 0.5 MILLILITER(S): 5; 2.5; 8; 8; 2.5 SUSPENSION INTRAMUSCULAR at 17:36

## 2019-08-20 RX ADMIN — AMLODIPINE BESYLATE 5 MILLIGRAM(S): 2.5 TABLET ORAL at 22:45

## 2019-08-20 RX ADMIN — ATORVASTATIN CALCIUM 20 MILLIGRAM(S): 80 TABLET, FILM COATED ORAL at 22:45

## 2019-08-20 RX ADMIN — TRAMADOL HYDROCHLORIDE 25 MILLIGRAM(S): 50 TABLET ORAL at 22:45

## 2019-08-20 NOTE — ED ADULT NURSE NOTE - NSIMPLEMENTINTERV_GEN_ALL_ED
Implemented All Fall with Harm Risk Interventions:  [place] to call system. Call bell, personal items and telephone within reach. Instruct patient to call for assistance. Room bathroom lighting operational. Non-slip footwear when patient is off stretcher. Physically safe environment: no spills, clutter or unnecessary equipment. Stretcher in lowest position, wheels locked, appropriate side rails in place. Provide visual cue, wrist band, yellow gown, etc. Monitor gait and stability. Monitor for mental status changes and reorient to person, place, and time. Review medications for side effects contributing to fall risk. Reinforce activity limits and safety measures with patient and family. Provide visual clues: red socks.

## 2019-08-20 NOTE — H&P ADULT - HISTORY OF PRESENT ILLNESS
89y Male hx of R femur IMN by Dr. Anderson in 2015 presents s/p mech fall c/o severe L hip pain and inability to ambulate.  Patient denies headstrike or LOC. Patient denies radiation of pain. Patient denies numbness/tingling/burning in the LLE. No other bone/joint complaints. Patient is a community ambulator at baseline with/without assistive devices.    PAST MEDICAL & SURGICAL HISTORY:  Osteoporosis  HLD (hyperlipidemia)  Hypertension  History of Prostate Cancer  H/O arthroplasty: Left shoulder.  S/P Prostatectomy  S/P Appendectomy    MEDICATIONS  (STANDING):    MEDICATIONS  (PRN):    Allergies    No Known Allergies    Intolerances                              11.7   8.3   )-----------( 230      ( 20 Aug 2019 17:06 )             36.2     08-20    138  |  104  |  20  ----------------------------<  127<H>  4.5   |  22  |  1.00    Ca    9.6      20 Aug 2019 17:06    TPro  6.2  /  Alb  4.2  /  TBili  0.4  /  DBili  x   /  AST  16  /  ALT  14  /  AlkPhos  49  08-20    PT/INR - ( 20 Aug 2019 17:06 )   PT: 10.8 sec;   INR: 0.94 ratio         PTT - ( 20 Aug 2019 17:06 )  PTT:24.8 sec    T(C): 36.3 (08-20-19 @ 22:00), Max: 36.7 (08-20-19 @ 16:13)  HR: 88 (08-20-19 @ 22:00) (71 - 88)  BP: 126/78 (08-20-19 @ 22:00) (126/78 - 163/85)  RR: 16 (08-20-19 @ 22:00) (16 - 20)  SpO2: 98% (08-20-19 @ 22:00) (95% - 98%)  Wt(kg): --    PE   LLE:  Skin intact; No ecchymosis/soft tissue swelling  Compartments soft; + TTP about hip. No TTP to knee/leg/ankle/foot   ROM lmited 2/2 pain   Unable to SLR; + Log Roll/Heel Strike  Motor intact GS/TA/FHL/EHL  SILT L2-S1  DP/PT pulses 2+    _LE/BUE:   No bony TTP; Good ROM w/o pain; Exam Unremarkable    Imaging:  XR demonstrating L IT fx

## 2019-08-20 NOTE — H&P ADULT - ASSESSMENT
89y Male with L IT fx  - plan for OR tomorrow AM  - Pain control  - NPOpm/IVF  - CBC/BMP/Coags/UA/T+S x2  - EKG/CXR  - Medical clearance pending  - Consent in chart

## 2019-08-20 NOTE — ED PROVIDER NOTE - PHYSICAL EXAMINATION
A primary and secondary survey was performed. Airway: oropharynx clear, Breathing: normal breath sounds bilaterally, pt phonating well, Circulation: Mentation normal, pulses palpated in all 4 limbs, no obvious active external hemorrhage, lungs/abd/pelvis/legs wo signs of blood accumulation. Disability: Neuro intact / pupils equal round reactive. Exposure: No external signs of trauma EXCEPT L leg deformed and abr to L elb/dorsal hand. Spine including c-spine non-tender. No neck pain and ROM wnl. C/spine cleared by nexus criteria.    Gen: Well appearing not in distress. Head: NC,AT. No lance sign/raccoon eyes. ENT: No hemoTM, oropharynx as above, no nasal hemorrhage. Neck: as above. Chest: Lung exam- CTAB, no ttp in chest wall. Cardiac: RRR S1S2, No JVD. Abd: soft, non-tender. Normal in color. Pelvis: Stable. L hip w ttp Ext: no ttp in all 4 limbs excpet l hip, rom at shoulders, elbows, R  hip and bl knee wnl, L hip w ttp / short / ext rotated Skin: No Abrasions or lacerations except 1 cm abr to dorsal L hand and small abr L elbow. Neuro: GCS 15 , Moving 4 limbs, Speech fluid and clear, unable to ambulate. Psych: Normal affect, judgment.

## 2019-08-20 NOTE — ED ADULT NURSE NOTE - OBJECTIVE STATEMENT
Male 89 years old with history of HTN and Prostate Cancer on Lupron was brought in by EMS for hip pain. As per daughter pt was in the backyard tripped and fell on his left side. No LOC, on Baby Aspirin daily. With left elbow and left hand skin tear. With outer rotation of left stalin, pain and tenderness on left hip. Pt is alert and orientedx3, hard of hearing. Denies chest pain, sob, N/V. Labs obtained. safety maintained. Will continue to reassess. daughter at bedside.

## 2019-08-20 NOTE — CONSULT NOTE ADULT - SUBJECTIVE AND OBJECTIVE BOX
The patient was seen and examined tonight after an accidental fall with a right intertrochanteric hip  fracture that requires orthopedic ORIF. 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 Exam, lab, EKG and CXR are stable. The patient is medically stable, medically optimized and has no medical contraindication to surgery tomorrow as required. Exam time 70 minutes including > than 50 % for bedside discussion and counseling. Comprehensive consultation dictated #[id number]. The patient was seen and examined tonight after an accidental fall with a right intertrochanteric hip  fracture that requires orthopedic ORIF. 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 Exam, lab, EKG and CXR are stable. The patient is medically stable, medically optimized and has no medical contraindication to surgery tomorrow as required. Exam time 70 minutes including > than 50 % for bedside discussion and counseling. Comprehensive consultation dictated #[id number]. The patient was seen and examined tonight after an accidental fall with a right intertrochanteric hip  fracture that requires orthopedic ORIF. 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 Exam, lab, EKG and CXR are stable. The patient is medically stable, medically optimized and has no medical contraindication to surgery tomorrow as required. Exam time 70 minutes including > than 50 % for bedside discussion and counseling. Comprehensive consultation dictated #[id number].         CONSULTING SERVICE:  Internal Medicine.    HISTORY OF PRESENT ILLNESS:  This is a 89-year-old male who was in his home at [place] when he was walking from the living room to the kitchen and he accidentally tripped and landed on his right hip.  He developed intense pain and was seen by his daughter who  brought him to the emergency room to check out.    The patient landed on his right hip and now has an acute three-phase intertrochanteric right hip fracture that requires ORIF.    MEDICATIONS:  The patient's present medications include 5 mg of amlodipine once a day, 20 mg of atorvastatin once a day, 10 mg of lisinopril once a day.  He takes melatonin 10 mg at bedtime and oxycodone as needed for pain.  He also takes senna two tablets at nighttime and tramadol 25 mg every 8 hours as needed by the patient.       PAST SURGICAL HISTORY:  The patient four years ago had an accidental fall with an acute left hip fracture.    PAST MEDICAL HISTORY  Hypertension, Prostatic Carcinoma, DJD L/S Spine, Hypercholesterolemia, Insomnia      ALLERGIES:   HE HAS INTOLERANCE OF ASPIRIN 81 MG BECAUSE OF GI UPSET.    SOCIAL HISTORY:  He is retired and worked at  college as a teacher.  Now he lives with his daughter and son.  He eats a regular diet and his weight is stable at 160 pounds.    FAMILY HISTORY:  Strongly positive for prostate carcinoma of both his father and his brother.      DIET:  The patient has followed a regular diet and he weighs 160 pounds.    REVIEW OF SYSTEMS:  On review of systems, he has profound deafness.  He has compromised vision secondary to macular degeneration.  He has positive sinusitis with a postnasal drip as a result of his daughter present.  His review of systems, he has profound hearing loss with augmentation for over 10 years.,  He has no dental disease or difficulty swallowing. He has sedentary lifestyle with shortness of breath with moderate ambulation. The patient has no changes and no shortness of breath, cough, congestion or wheezing.   He has no chest pressure, angina or recent heart disease.  He has no abdominal pain, change in bowel habits or GI bleeding.  He has frequent urination every two hours. He has arthritis of the back, hips and knees. He has no neurologic complaints.  He has no diabetes or thyroid disease.    PHYSICAL EXAMINATION:  VITAL SIGNS:  Blood pressure is 140/70, pulse is 68, respirations 17.  HEAD AND NECK:  Examination is normal.  There is no lymphadenopathy.  CHEST:  Clear with good breath sounds.  CARDIAC:  Examination shows no gallops or murmurs. except  for 2/6 systolic ejection murmur in the right upper sternal border. Abdomen is normal. There is 4+ Right hip swelling with no lateral displacement of the fracture site.      LABORATORY DATA:  Laboratories obtained, shows CBC with hemoglobin 11.7, hematocrit 36,2, white count is 8.3, platelet count is 230, 000.  INR is 0.94.  PTT is elevated at 24.8.  CMP is normal  Laboratories as noted are unremarkable.    DIAGNOSTIC DATA:  EKG was normal sinus rhythm with no ST-T waves.  Chest x-ray was clear with no infiltrates.    IMPRESSION/PLAN:  The impression at this time is that the patient has an accidental fall and requires open reduction and internal fixation as soon as possible.  The impression at this time is that the patient has an accidental fall with right intertrochanteric fracture but no displacement.  He is in chronic and acute pain.  His chronic pain originates from his lumbosacral spine. The patient is medically stable and has no medical contraindications to surgery, as required.    Examination time was 70 minutes, of which greater than 50% was necessary for bedside discussion and counseling.  The patient will continued to be followed by Medical Service postoperatively to lessen the risk of developing complications.        _______________________    DICT:	FATEMEH RODRIGUEZ MD ([id number]) 08/21/2019 01:41 AM  TRANS:	[id number] 08/21/2019 04:06 AM  JOB:	[id number]     Electronically Signed by:  FATEMEH RODRIGUEZ 08/22/2019 04:11:49 AM

## 2019-08-20 NOTE — ED PROVIDER NOTE - OBJECTIVE STATEMENT
89 male ground level fall wo loc or head strike on asa 81, now w L hip pain mod const sharp , had R hip fx in past, also notes abr to L hand and elb wo  much pain. no abd/chest pain. no sob. no ha. feels fine except the pain to hip.

## 2019-08-20 NOTE — ED PROVIDER NOTE - CARE PLAN
Principal Discharge DX:	Closed fracture of left hip, initial encounter  Goal:	comminuted intertrochanteric

## 2019-08-21 DIAGNOSIS — Z85.46 PERSONAL HISTORY OF MALIGNANT NEOPLASM OF PROSTATE: ICD-10-CM

## 2019-08-21 DIAGNOSIS — I10 ESSENTIAL (PRIMARY) HYPERTENSION: ICD-10-CM

## 2019-08-21 DIAGNOSIS — M81.0 AGE-RELATED OSTEOPOROSIS WITHOUT CURRENT PATHOLOGICAL FRACTURE: ICD-10-CM

## 2019-08-21 DIAGNOSIS — S72.002A FRACTURE OF UNSPECIFIED PART OF NECK OF LEFT FEMUR, INITIAL ENCOUNTER FOR CLOSED FRACTURE: ICD-10-CM

## 2019-08-21 LAB
ANION GAP SERPL CALC-SCNC: 11 MMOL/L — SIGNIFICANT CHANGE UP (ref 5–17)
ANION GAP SERPL CALC-SCNC: 9 MMOL/L — SIGNIFICANT CHANGE UP (ref 5–17)
APTT BLD: 26.8 SEC — LOW (ref 27.5–36.3)
BLD GP AB SCN SERPL QL: NEGATIVE — SIGNIFICANT CHANGE UP
BUN SERPL-MCNC: 18 MG/DL — SIGNIFICANT CHANGE UP (ref 7–23)
BUN SERPL-MCNC: 20 MG/DL — SIGNIFICANT CHANGE UP (ref 7–23)
CALCIUM SERPL-MCNC: 8.3 MG/DL — LOW (ref 8.4–10.5)
CALCIUM SERPL-MCNC: 8.4 MG/DL — SIGNIFICANT CHANGE UP (ref 8.4–10.5)
CHLORIDE SERPL-SCNC: 103 MMOL/L — SIGNIFICANT CHANGE UP (ref 96–108)
CHLORIDE SERPL-SCNC: 105 MMOL/L — SIGNIFICANT CHANGE UP (ref 96–108)
CO2 SERPL-SCNC: 19 MMOL/L — LOW (ref 22–31)
CO2 SERPL-SCNC: 22 MMOL/L — SIGNIFICANT CHANGE UP (ref 22–31)
CREAT SERPL-MCNC: 0.8 MG/DL — SIGNIFICANT CHANGE UP (ref 0.5–1.3)
CREAT SERPL-MCNC: 0.84 MG/DL — SIGNIFICANT CHANGE UP (ref 0.5–1.3)
GLUCOSE SERPL-MCNC: 119 MG/DL — HIGH (ref 70–99)
GLUCOSE SERPL-MCNC: 146 MG/DL — HIGH (ref 70–99)
HCT VFR BLD CALC: 25.6 % — LOW (ref 39–50)
HCT VFR BLD CALC: 28.6 % — LOW (ref 39–50)
HCT VFR BLD CALC: 30.9 % — LOW (ref 39–50)
HGB BLD-MCNC: 10.2 G/DL — LOW (ref 13–17)
HGB BLD-MCNC: 8.8 G/DL — LOW (ref 13–17)
HGB BLD-MCNC: 9.5 G/DL — LOW (ref 13–17)
INR BLD: 1.05 RATIO — SIGNIFICANT CHANGE UP (ref 0.88–1.16)
MCHC RBC-ENTMCNC: 30 PG — SIGNIFICANT CHANGE UP (ref 27–34)
MCHC RBC-ENTMCNC: 30.1 PG — SIGNIFICANT CHANGE UP (ref 27–34)
MCHC RBC-ENTMCNC: 31.1 PG — SIGNIFICANT CHANGE UP (ref 27–34)
MCHC RBC-ENTMCNC: 32.9 GM/DL — SIGNIFICANT CHANGE UP (ref 32–36)
MCHC RBC-ENTMCNC: 33.1 GM/DL — SIGNIFICANT CHANGE UP (ref 32–36)
MCHC RBC-ENTMCNC: 34.5 GM/DL — SIGNIFICANT CHANGE UP (ref 32–36)
MCV RBC AUTO: 90.4 FL — SIGNIFICANT CHANGE UP (ref 80–100)
MCV RBC AUTO: 91 FL — SIGNIFICANT CHANGE UP (ref 80–100)
MCV RBC AUTO: 91.2 FL — SIGNIFICANT CHANGE UP (ref 80–100)
PLATELET # BLD AUTO: 178 K/UL — SIGNIFICANT CHANGE UP (ref 150–400)
PLATELET # BLD AUTO: 200 K/UL — SIGNIFICANT CHANGE UP (ref 150–400)
PLATELET # BLD AUTO: 206 K/UL — SIGNIFICANT CHANGE UP (ref 150–400)
POTASSIUM SERPL-MCNC: 4.2 MMOL/L — SIGNIFICANT CHANGE UP (ref 3.5–5.3)
POTASSIUM SERPL-MCNC: 4.4 MMOL/L — SIGNIFICANT CHANGE UP (ref 3.5–5.3)
POTASSIUM SERPL-SCNC: 4.2 MMOL/L — SIGNIFICANT CHANGE UP (ref 3.5–5.3)
POTASSIUM SERPL-SCNC: 4.4 MMOL/L — SIGNIFICANT CHANGE UP (ref 3.5–5.3)
PROTHROM AB SERPL-ACNC: 12.1 SEC — SIGNIFICANT CHANGE UP (ref 10–12.9)
RBC # BLD: 2.83 M/UL — LOW (ref 4.2–5.8)
RBC # BLD: 3.14 M/UL — LOW (ref 4.2–5.8)
RBC # BLD: 3.39 M/UL — LOW (ref 4.2–5.8)
RBC # FLD: 11.6 % — SIGNIFICANT CHANGE UP (ref 10.3–14.5)
RBC # FLD: 11.6 % — SIGNIFICANT CHANGE UP (ref 10.3–14.5)
RBC # FLD: 11.7 % — SIGNIFICANT CHANGE UP (ref 10.3–14.5)
RH IG SCN BLD-IMP: POSITIVE — SIGNIFICANT CHANGE UP
SODIUM SERPL-SCNC: 133 MMOL/L — LOW (ref 135–145)
SODIUM SERPL-SCNC: 136 MMOL/L — SIGNIFICANT CHANGE UP (ref 135–145)
WBC # BLD: 14.8 K/UL — HIGH (ref 3.8–10.5)
WBC # BLD: 15.9 K/UL — HIGH (ref 3.8–10.5)
WBC # BLD: 18.5 K/UL — HIGH (ref 3.8–10.5)
WBC # FLD AUTO: 14.8 K/UL — HIGH (ref 3.8–10.5)
WBC # FLD AUTO: 15.9 K/UL — HIGH (ref 3.8–10.5)
WBC # FLD AUTO: 18.5 K/UL — HIGH (ref 3.8–10.5)

## 2019-08-21 PROCEDURE — 27245 TREAT THIGH FRACTURE: CPT | Mod: LT

## 2019-08-21 PROCEDURE — 99222 1ST HOSP IP/OBS MODERATE 55: CPT | Mod: 57

## 2019-08-21 PROCEDURE — 72170 X-RAY EXAM OF PELVIS: CPT | Mod: 26

## 2019-08-21 RX ORDER — MAGNESIUM HYDROXIDE 400 MG/1
30 TABLET, CHEWABLE ORAL DAILY
Refills: 0 | Status: DISCONTINUED | OUTPATIENT
Start: 2019-08-21 | End: 2019-08-23

## 2019-08-21 RX ORDER — ENOXAPARIN SODIUM 100 MG/ML
30 INJECTION SUBCUTANEOUS DAILY
Refills: 0 | Status: DISCONTINUED | OUTPATIENT
Start: 2019-08-22 | End: 2019-08-23

## 2019-08-21 RX ORDER — TRAMADOL HYDROCHLORIDE 50 MG/1
25 TABLET ORAL EVERY 4 HOURS
Refills: 0 | Status: DISCONTINUED | OUTPATIENT
Start: 2019-08-21 | End: 2019-08-23

## 2019-08-21 RX ORDER — HYDROMORPHONE HYDROCHLORIDE 2 MG/ML
0.25 INJECTION INTRAMUSCULAR; INTRAVENOUS; SUBCUTANEOUS
Refills: 0 | Status: DISCONTINUED | OUTPATIENT
Start: 2019-08-21 | End: 2019-08-21

## 2019-08-21 RX ORDER — SENNA PLUS 8.6 MG/1
2 TABLET ORAL AT BEDTIME
Refills: 0 | Status: DISCONTINUED | OUTPATIENT
Start: 2019-08-21 | End: 2019-08-23

## 2019-08-21 RX ORDER — OXYCODONE HYDROCHLORIDE 5 MG/1
2.5 TABLET ORAL EVERY 6 HOURS
Refills: 0 | Status: DISCONTINUED | OUTPATIENT
Start: 2019-08-21 | End: 2019-08-23

## 2019-08-21 RX ORDER — LISINOPRIL 2.5 MG/1
10 TABLET ORAL DAILY
Refills: 0 | Status: DISCONTINUED | OUTPATIENT
Start: 2019-08-21 | End: 2019-08-23

## 2019-08-21 RX ORDER — PHENYLEPHRINE HYDROCHLORIDE 10 MG/ML
0.5 INJECTION INTRAVENOUS
Qty: 40 | Refills: 0 | Status: DISCONTINUED | OUTPATIENT
Start: 2019-08-21 | End: 2019-08-21

## 2019-08-21 RX ORDER — ONDANSETRON 8 MG/1
4 TABLET, FILM COATED ORAL ONCE
Refills: 0 | Status: DISCONTINUED | OUTPATIENT
Start: 2019-08-21 | End: 2019-08-21

## 2019-08-21 RX ORDER — ASPIRIN/CALCIUM CARB/MAGNESIUM 324 MG
81 TABLET ORAL DAILY
Refills: 0 | Status: DISCONTINUED | OUTPATIENT
Start: 2019-08-21 | End: 2019-08-23

## 2019-08-21 RX ORDER — BENZOCAINE AND MENTHOL 5; 1 G/100ML; G/100ML
1 LIQUID ORAL
Refills: 0 | Status: DISCONTINUED | OUTPATIENT
Start: 2019-08-21 | End: 2019-08-23

## 2019-08-21 RX ORDER — DIPHENHYDRAMINE HCL 50 MG
25 CAPSULE ORAL EVERY 4 HOURS
Refills: 0 | Status: DISCONTINUED | OUTPATIENT
Start: 2019-08-21 | End: 2019-08-23

## 2019-08-21 RX ORDER — SODIUM CHLORIDE 9 MG/ML
1000 INJECTION INTRAMUSCULAR; INTRAVENOUS; SUBCUTANEOUS
Refills: 0 | Status: DISCONTINUED | OUTPATIENT
Start: 2019-08-21 | End: 2019-08-22

## 2019-08-21 RX ORDER — OXYCODONE HYDROCHLORIDE 5 MG/1
2.5 TABLET ORAL EVERY 4 HOURS
Refills: 0 | Status: DISCONTINUED | OUTPATIENT
Start: 2019-08-21 | End: 2019-08-21

## 2019-08-21 RX ORDER — POLYETHYLENE GLYCOL 3350 17 G/17G
17 POWDER, FOR SOLUTION ORAL DAILY
Refills: 0 | Status: DISCONTINUED | OUTPATIENT
Start: 2019-08-21 | End: 2019-08-23

## 2019-08-21 RX ORDER — CEFAZOLIN SODIUM 1 G
2000 VIAL (EA) INJECTION EVERY 8 HOURS
Refills: 0 | Status: COMPLETED | OUTPATIENT
Start: 2019-08-21 | End: 2019-08-22

## 2019-08-21 RX ORDER — LANOLIN ALCOHOL/MO/W.PET/CERES
3 CREAM (GRAM) TOPICAL AT BEDTIME
Refills: 0 | Status: DISCONTINUED | OUTPATIENT
Start: 2019-08-21 | End: 2019-08-23

## 2019-08-21 RX ORDER — ATORVASTATIN CALCIUM 80 MG/1
20 TABLET, FILM COATED ORAL AT BEDTIME
Refills: 0 | Status: DISCONTINUED | OUTPATIENT
Start: 2019-08-21 | End: 2019-08-23

## 2019-08-21 RX ORDER — DOCUSATE SODIUM 100 MG
100 CAPSULE ORAL THREE TIMES A DAY
Refills: 0 | Status: DISCONTINUED | OUTPATIENT
Start: 2019-08-21 | End: 2019-08-23

## 2019-08-21 RX ORDER — OXYCODONE HYDROCHLORIDE 5 MG/1
5 TABLET ORAL EVERY 4 HOURS
Refills: 0 | Status: DISCONTINUED | OUTPATIENT
Start: 2019-08-21 | End: 2019-08-21

## 2019-08-21 RX ORDER — ONDANSETRON 8 MG/1
4 TABLET, FILM COATED ORAL EVERY 6 HOURS
Refills: 0 | Status: DISCONTINUED | OUTPATIENT
Start: 2019-08-21 | End: 2019-08-23

## 2019-08-21 RX ORDER — POTASSIUM CHLORIDE 20 MEQ
20 PACKET (EA) ORAL ONCE
Refills: 0 | Status: COMPLETED | OUTPATIENT
Start: 2019-08-21 | End: 2019-08-21

## 2019-08-21 RX ORDER — FUROSEMIDE 40 MG
20 TABLET ORAL ONCE
Refills: 0 | Status: COMPLETED | OUTPATIENT
Start: 2019-08-21 | End: 2019-08-21

## 2019-08-21 RX ORDER — HALOPERIDOL DECANOATE 100 MG/ML
0.5 INJECTION INTRAMUSCULAR ONCE
Refills: 0 | Status: DISCONTINUED | OUTPATIENT
Start: 2019-08-21 | End: 2019-08-23

## 2019-08-21 RX ORDER — AMLODIPINE BESYLATE 2.5 MG/1
5 TABLET ORAL DAILY
Refills: 0 | Status: DISCONTINUED | OUTPATIENT
Start: 2019-08-21 | End: 2019-08-23

## 2019-08-21 RX ORDER — ACETAMINOPHEN 500 MG
975 TABLET ORAL EVERY 8 HOURS
Refills: 0 | Status: DISCONTINUED | OUTPATIENT
Start: 2019-08-21 | End: 2019-08-23

## 2019-08-21 RX ADMIN — Medication 100 MILLIGRAM(S): at 21:36

## 2019-08-21 RX ADMIN — ATORVASTATIN CALCIUM 20 MILLIGRAM(S): 80 TABLET, FILM COATED ORAL at 21:36

## 2019-08-21 RX ADMIN — Medication 975 MILLIGRAM(S): at 05:19

## 2019-08-21 RX ADMIN — SODIUM CHLORIDE 125 MILLILITER(S): 9 INJECTION INTRAMUSCULAR; INTRAVENOUS; SUBCUTANEOUS at 15:44

## 2019-08-21 RX ADMIN — LISINOPRIL 10 MILLIGRAM(S): 2.5 TABLET ORAL at 05:20

## 2019-08-21 RX ADMIN — OXYCODONE HYDROCHLORIDE 5 MILLIGRAM(S): 5 TABLET ORAL at 22:06

## 2019-08-21 RX ADMIN — Medication 975 MILLIGRAM(S): at 05:40

## 2019-08-21 RX ADMIN — SODIUM CHLORIDE 125 MILLILITER(S): 9 INJECTION INTRAMUSCULAR; INTRAVENOUS; SUBCUTANEOUS at 01:17

## 2019-08-21 RX ADMIN — Medication 975 MILLIGRAM(S): at 22:06

## 2019-08-21 RX ADMIN — AMLODIPINE BESYLATE 5 MILLIGRAM(S): 2.5 TABLET ORAL at 21:38

## 2019-08-21 RX ADMIN — OXYCODONE HYDROCHLORIDE 5 MILLIGRAM(S): 5 TABLET ORAL at 16:29

## 2019-08-21 RX ADMIN — Medication 100 MILLIGRAM(S): at 19:47

## 2019-08-21 RX ADMIN — OXYCODONE HYDROCHLORIDE 5 MILLIGRAM(S): 5 TABLET ORAL at 18:48

## 2019-08-21 RX ADMIN — SENNA PLUS 2 TABLET(S): 8.6 TABLET ORAL at 21:36

## 2019-08-21 RX ADMIN — Medication 3 MILLIGRAM(S): at 21:38

## 2019-08-21 RX ADMIN — OXYCODONE HYDROCHLORIDE 5 MILLIGRAM(S): 5 TABLET ORAL at 21:36

## 2019-08-21 RX ADMIN — Medication 975 MILLIGRAM(S): at 21:36

## 2019-08-21 NOTE — PROGRESS NOTE ADULT - SUBJECTIVE AND OBJECTIVE BOX
PA STUDENT POST-OP NOTE    SUBJECTIVE: Patient is resting and eating dinner comfortably in bed with daughter at bedside. He reports left hip incisional pain that is aggregated with movement but is otherwise without complaints. Per daughter, patient has yet to have a bowel movement which is why she is encouraging him to eat. He denies fevers, chest pain, shortness of breath. Patient is tachycardic on monitor.     OBJECTIVE:    Vital Signs Last 24 Hrs  T(C): 37.1 (21 Aug 2019 19:00), Max: 37.4 (21 Aug 2019 16:14)  T(F): 98.8 (21 Aug 2019 19:00), Max: 99.3 (21 Aug 2019 16:14)  HR: 102 (21 Aug 2019 19:00) (74 - 102)  BP: 101/70 (21 Aug 2019 19:00) (86/54 - 145/66)  BP(mean): 87 (21 Aug 2019 15:30) (66 - 98)  RR: 18 (21 Aug 2019 19:00) (14 - 20)  SpO2: 95% (21 Aug 2019 19:00) (94% - 100%)    Basic Metabolic Panel (08.21.19 @ 13:46)    Sodium, Serum: 133 mmol/L    Potassium, Serum: 4.4 mmol/L    Chloride, Serum: 103 mmol/L    Carbon Dioxide, Serum: 19 mmol/L    Anion Gap, Serum: 11 mmol/L    Blood Urea Nitrogen, Serum: 18 mg/dL    Creatinine, Serum: 0.80 mg/dL    Glucose, Serum: 119 mg/dL    Calcium, Total Serum: 8.3 mg/dL    Complete Blood Count STAT (08.21.19 @ 13:46)    WBC Count: 15.9 K/uL    RBC Count: 3.14 M/uL    Hemoglobin: 9.5 g/dL    Hematocrit: 28.6 %    Mean Cell Volume: 91.0 fl    Mean Cell Hemoglobin: 30.1 pg    Mean Cell Hemoglobin Conc: 33.1 gm/dL    Red Cell Distrib Width: 11.7 %    Platelet Count - Automated: 206 K/uL    PHYSICAL EXAM:  GENERALIZED: Appears as stated age. Cooperative. No acute distress. Hard of Hearing. A/O x 3.  CARDIAC: Tachycardic at a rate of 102. Regular Rhythm. +S1. +S2. No murmurs or gallops noted on exam.   PULMONARY: Anterior and Posterior Lung Fields are clear to auscultation. No wheezes or crackles noted on exam.  GI: Abdomen is soft, nontender, and mildly distended. Bowel Sounds are active in all four quadrants.   EXT: LLE  -Dressing is clean, dry and intact. No erythema or circumscribed cellulitis noted on exam.  -Venodyne is in place.  -Calf is nontender to palpation bilaterally.   -FHL/EHL Motor 5/5  -Gastrocnemius and Anterior Tibialis Motor 5/5  -Sensation to light touch intact.  -Dorsalis Pedis Pulse 1+.  -Capillary Refill < 2 seconds. PA STUDENT POST-OP NOTE    SUBJECTIVE: Patient is resting and eating dinner comfortably in bed with daughter at bedside. He reports left hip incisional pain that is aggregated with movement but is otherwise without complaints. Per daughter, patient has yet to have a bowel movement which is why she is encouraging him to eat. He denies fevers, chest pain, shortness of breath. Patient is tachycardic on monitor.     OBJECTIVE:    Vital Signs Last 24 Hrs  T(C): 37.1 (21 Aug 2019 19:00), Max: 37.4 (21 Aug 2019 16:14)  T(F): 98.8 (21 Aug 2019 19:00), Max: 99.3 (21 Aug 2019 16:14)  HR: 102 (21 Aug 2019 19:00) (74 - 102)  BP: 101/70 (21 Aug 2019 19:00) (86/54 - 145/66)  BP(mean): 87 (21 Aug 2019 15:30) (66 - 98)  RR: 18 (21 Aug 2019 19:00) (14 - 20)  SpO2: 95% (21 Aug 2019 19:00) (94% - 100%)    Basic Metabolic Panel (08.21.19 @ 13:46)    Sodium, Serum: 133 mmol/L    Potassium, Serum: 4.4 mmol/L    Chloride, Serum: 103 mmol/L    Carbon Dioxide, Serum: 19 mmol/L    Anion Gap, Serum: 11 mmol/L    Blood Urea Nitrogen, Serum: 18 mg/dL    Creatinine, Serum: 0.80 mg/dL    Glucose, Serum: 119 mg/dL    Calcium, Total Serum: 8.3 mg/dL    Complete Blood Count STAT (08.21.19 @ 13:46)    WBC Count: 15.9 K/uL    RBC Count: 3.14 M/uL    Hemoglobin: 9.5 g/dL    Hematocrit: 28.6 %    Mean Cell Volume: 91.0 fl    Mean Cell Hemoglobin: 30.1 pg    Mean Cell Hemoglobin Conc: 33.1 gm/dL    Red Cell Distrib Width: 11.7 %    Platelet Count - Automated: 206 K/uL    PHYSICAL EXAM:  GENERALIZED: Appears as stated age. Cooperative. No acute distress. Hard of Hearing. A/O x 3.  CARDIAC: Tachycardic at a rate of 102. Regular Rhythm. +S1. +S2. No murmurs or gallops noted on exam.   PULMONARY: Anterior and Posterior Lung Fields are clear to auscultation. No wheezes or crackles noted on exam.  GI: Abdomen is soft, nontender, and mildly distended. Bowel Sounds are active in all four quadrants.   EXT: LLE  -Dressing is clean, dry and intact. No erythema or circumscribed cellulitis noted on exam.  -Venodyne is in place.  -Calf is nontender to palpation bilaterally.   -3+ Pitting Edema noted to BLE.  -FHL/EHL Motor 5/5  -Gastrocnemius and Anterior Tibialis Motor 5/5  -Sensation to light touch intact.  -Dorsalis Pedis Pulse 1+.  -Capillary Refill < 2 seconds.

## 2019-08-21 NOTE — PROGRESS NOTE ADULT - SUBJECTIVE AND OBJECTIVE BOX
Subjective: Patient seen and examined at bedside. Reports no acute complaints at this time. Pain is well controlled.    Objective:  Vital Signs Last 24 Hrs  T(C): 36.7 (21 Aug 2019 05:14), Max: 37.1 (21 Aug 2019 00:54)  T(F): 98 (21 Aug 2019 05:14), Max: 98.8 (21 Aug 2019 00:54)  HR: 83 (21 Aug 2019 05:14) (71 - 101)  BP: 122/75 (21 Aug 2019 05:14) (122/75 - 163/85)  BP(mean): --  RR: 18 (21 Aug 2019 05:14) (16 - 20)  SpO2: 97% (21 Aug 2019 05:14) (95% - 98%)                          10.2   14.8  )-----------( 200      ( 21 Aug 2019 04:41 )             30.9     08-21    136  |  105  |  20  ----------------------------<  146<H>  4.2   |  22  |  0.84    Ca    8.4      21 Aug 2019 04:41    TPro  6.2  /  Alb  4.2  /  TBili  0.4  /  DBili  x   /  AST  16  /  ALT  14  /  AlkPhos  49  08-20    PT/INR - ( 21 Aug 2019 04:41 )   PT: 12.1 sec;   INR: 1.05 ratio         PTT - ( 21 Aug 2019 04:41 )  PTT:26.8 sec    MEDICATIONS  (STANDING):  acetaminophen   Tablet .. 975 milliGRAM(s) Oral every 8 hours  amLODIPine   Tablet 5 milliGRAM(s) Oral daily  aspirin enteric coated 81 milliGRAM(s) Oral daily  atorvastatin 20 milliGRAM(s) Oral at bedtime  docusate sodium 100 milliGRAM(s) Oral three times a day  lisinopril 10 milliGRAM(s) Oral daily  senna 2 Tablet(s) Oral at bedtime  sodium chloride 0.9%. 1000 milliLiter(s) (125 mL/Hr) IV Continuous <Continuous>    PHYSICAL EXAM:  Gen: NAD, AAOx3  Left Lower Extremity:  +EHL/FHL/TA/GS  SILT L3-S1  +DP/PT Pulses  Compartments soft  No calf TTP B/L

## 2019-08-21 NOTE — PROGRESS NOTE ADULT - SUBJECTIVE AND OBJECTIVE BOX
89 male ground level fall wo loc or head strike on asa 81, now w L hip pain mod const sharp , Patient was briught to [facility] where he was found to have a left hip fx. Patient seen now resting comfortably after an Open reduction and internal fixation of the left hip       MEDICATIONS  (STANDING):  acetaminophen   Tablet .. 975 milliGRAM(s) Oral every 8 hours  amLODIPine   Tablet 5 milliGRAM(s) Oral daily  aspirin enteric coated 81 milliGRAM(s) Oral daily  atorvastatin 20 milliGRAM(s) Oral at bedtime  calcium carbonate 1250 mG  + Vitamin D (OsCal 500 + D) 1 Tablet(s) Oral daily  ceFAZolin   IVPB 2000 milliGRAM(s) IV Intermittent every 8 hours  docusate sodium 100 milliGRAM(s) Oral three times a day  lisinopril 10 milliGRAM(s) Oral daily  polyethylene glycol 3350 17 Gram(s) Oral daily  senna 2 Tablet(s) Oral at bedtime  sodium chloride 0.9%. 1000 milliLiter(s) (125 mL/Hr) IV Continuous <Continuous>    MEDICATIONS  (PRN):  benzocaine 15 mG/menthol 3.6 mG Lozenge 1 Lozenge Oral every 2 hours PRN Sore Throat  diphenhydrAMINE 25 milliGRAM(s) Oral every 4 hours PRN Rash and/or Itching  haloperidol    Injectable 0.5 milliGRAM(s) IV Push once PRN severe agitation  magnesium hydroxide Suspension 30 milliLiter(s) Oral daily PRN Constipation  melatonin 3 milliGRAM(s) Oral at bedtime PRN Insomnia  ondansetron Injectable 4 milliGRAM(s) IV Push every 6 hours PRN Nausea and/or Vomiting  oxyCODONE    IR 2.5 milliGRAM(s) Oral every 4 hours PRN Moderate Pain (4 - 6)  oxyCODONE    IR 5 milliGRAM(s) Oral every 4 hours PRN Severe Pain (7 - 10)          VITALS:   T(C): 37.4 (08-21-19 @ 16:14), Max: 37.4 (08-21-19 @ 16:14)  HR: 86 (08-21-19 @ 16:14) (74 - 101)  BP: 133/71 (08-21-19 @ 16:14) (86/54 - 145/66)  RR: 18 (08-21-19 @ 16:14) (14 - 20)  SpO2: 96% (08-21-19 @ 16:14) (94% - 100%)  Wt(kg): --    PHYSICAL EXAM:  GENERAL: NAD, well nourished and conversant  HEAD:  Atraumatic  EYES: EOM, PERRLA, conjunctiva pink and sclera white  ENT: No tonsillar erythema, exudates, or enlargement, moist mucous membranes, [place]  NECK: Supple, No JVD, normal thyroid, carotids with normal upstrokes and no bruits  CHEST/LUNG: Clear to auscultation bilaterally, No rales, rhonchi, wheezing, or rubs  HEART: Regular rate and rhythm, No murmurs, rubs, or gallops  ABDOMEN: Soft, nondistended, no masses, guarding, tenderness or rebound, bowel sounds present  EXTREMITIES:  2+ Peripheral Pulses, No clubbing, cyanosis, or edema. s/p re[aojf pf ;ef  LYMPH: No lymphadenopathy noted  SKIN: No rashes or lesions  NERVOUS SYSTEM:  Alert & Oriented X3, normal cognitive function. Motor Strength 5/5 right upper and right lower.  5/5 left upper and left lower extremities, DTRs 2+ intact and symmetric      LABS:        CBC Full  -  ( 21 Aug 2019 13:46 )  WBC Count : 15.9 K/uL  RBC Count : 3.14 M/uL  Hemoglobin : 9.5 g/dL  Hematocrit : 28.6 %  Platelet Count - Automated : 206 K/uL  Mean Cell Volume : 91.0 fl  Mean Cell Hemoglobin : 30.1 pg  Mean Cell Hemoglobin Concentration : 33.1 gm/dL  Auto Neutrophil # : x  Auto Lymphocyte # : x  Auto Monocyte # : x  Auto Eosinophil # : x  Auto Basophil # : x  Auto Neutrophil % : x  Auto Lymphocyte % : x  Auto Monocyte % : x  Auto Eosinophil % : x  Auto Basophil % : x    08-21    133<L>  |  103  |  18  ----------------------------<  119<H>  4.4   |  19<L>  |  0.80    Ca    8.3<L>      21 Aug 2019 13:46    TPro  6.2  /  Alb  4.2  /  TBili  0.4  /  DBili  x   /  AST  16  /  ALT  14  /  AlkPhos  49  08-20    LIVER FUNCTIONS - ( 20 Aug 2019 17:06 )  Alb: 4.2 g/dL / Pro: 6.2 g/dL / ALK PHOS: 49 U/L / ALT: 14 U/L / AST: 16 U/L / GGT: x           PT/INR - ( 21 Aug 2019 04:41 )   PT: 12.1 sec;   INR: 1.05 ratio         PTT - ( 21 Aug 2019 04:41 )  PTT:26.8 sec  Urinalysis Basic - ( 20 Aug 2019 22:14 )    Color: x / Appearance: x / SG: x / pH: x  Gluc: x / Ketone: x  / Bili: x / Urobili: x   Blood: x / Protein: x / Nitrite: x   Leuk Esterase: x / RBC: 0 /HPF / WBC 0 /HPF   Sq Epi: x / Non Sq Epi: 0 /HPF / Bacteria: Negative      CAPILLARY BLOOD GLUCOSE          RADIOLOGY & ADDITIONAL TESTS:

## 2019-08-21 NOTE — PROGRESS NOTE ADULT - ASSESSMENT
ASSESSMENT: 89 year old  male with PMH of Prostate cancer, HLD, HTN, Osteoporosis admitted to the orthopaedic floor s/p LMN of the left IT fracture; patient in stable condition.      PLAN:  -WBAT LLE.  -DVT Prophylaxis with Enoxaparin 40 mg SQ daily and Aspirin 81 mg PO daily.  -Low Sodium Diet  -1000 mL normal saline for tachycardia.   -Continue pain management with Acetaminophen 975 mg PO q8h, Oxycodone 2.5 mg PO q4h PRN moderate pain, Oxycodone 5mg PO q4h PRN severe pain.   -Continue home HTN medications.   -Evaluation by Physical Therapy and Occupational Therapy.   -Follow up on AM CBC and BMP.  -Follow up on bowel movements.     Disposition: Anticipate home, final disposition to be determined by Physical Therapy.     *Patient case has been discussed with attending Dr. Anderson and PA Preceptor Elias Mijares. ASSESSMENT: 89 year old  male with PMH of Prostate cancer, HLD, HTN, Osteoporosis admitted to the orthopaedic floor s/p LMN of the left IT fracture; patient in stable condition.      PLAN:  -PT: WBAT LLE.  -DVT Prophylaxis with Enoxaparin 40 mg SQ daily and Aspirin 81 mg PO daily.  -Low Sodium Diet  -Packed Red Blood Cells for tachycardia.   -Continue pain management with Acetaminophen 975 mg PO q8h, Oxycodone 2.5 mg PO q4h PRN moderate pain, Oxycodone 5mg PO q4h PRN severe pain.   -Continue home HTN medications.    -Follow up on AM CBC and BMP.    Disposition: To be determined.

## 2019-08-22 LAB
ANION GAP SERPL CALC-SCNC: 9 MMOL/L — SIGNIFICANT CHANGE UP (ref 5–17)
BUN SERPL-MCNC: 21 MG/DL — SIGNIFICANT CHANGE UP (ref 7–23)
CALCIUM SERPL-MCNC: 7.8 MG/DL — LOW (ref 8.4–10.5)
CHLORIDE SERPL-SCNC: 102 MMOL/L — SIGNIFICANT CHANGE UP (ref 96–108)
CO2 SERPL-SCNC: 21 MMOL/L — LOW (ref 22–31)
CREAT SERPL-MCNC: 0.97 MG/DL — SIGNIFICANT CHANGE UP (ref 0.5–1.3)
GLUCOSE SERPL-MCNC: 146 MG/DL — HIGH (ref 70–99)
HCT VFR BLD CALC: 26.4 % — LOW (ref 39–50)
HCT VFR BLD CALC: 26.4 % — LOW (ref 39–50)
HGB BLD-MCNC: 8.4 G/DL — LOW (ref 13–17)
HGB BLD-MCNC: 8.9 G/DL — LOW (ref 13–17)
MCHC RBC-ENTMCNC: 28.7 PG — SIGNIFICANT CHANGE UP (ref 27–34)
MCHC RBC-ENTMCNC: 30.9 PG — SIGNIFICANT CHANGE UP (ref 27–34)
MCHC RBC-ENTMCNC: 31.8 GM/DL — LOW (ref 32–36)
MCHC RBC-ENTMCNC: 33.8 GM/DL — SIGNIFICANT CHANGE UP (ref 32–36)
MCV RBC AUTO: 90.1 FL — SIGNIFICANT CHANGE UP (ref 80–100)
MCV RBC AUTO: 91.2 FL — SIGNIFICANT CHANGE UP (ref 80–100)
PLATELET # BLD AUTO: 152 K/UL — SIGNIFICANT CHANGE UP (ref 150–400)
PLATELET # BLD AUTO: 155 K/UL — SIGNIFICANT CHANGE UP (ref 150–400)
POTASSIUM SERPL-MCNC: 4 MMOL/L — SIGNIFICANT CHANGE UP (ref 3.5–5.3)
POTASSIUM SERPL-SCNC: 4 MMOL/L — SIGNIFICANT CHANGE UP (ref 3.5–5.3)
PSA FLD-MCNC: 60.7 NG/ML — HIGH (ref 0–4)
RBC # BLD: 2.89 M/UL — LOW (ref 4.2–5.8)
RBC # BLD: 2.93 M/UL — LOW (ref 4.2–5.8)
RBC # FLD: 12.2 % — SIGNIFICANT CHANGE UP (ref 10.3–14.5)
RBC # FLD: 13 % — SIGNIFICANT CHANGE UP (ref 10.3–14.5)
SODIUM SERPL-SCNC: 132 MMOL/L — LOW (ref 135–145)
WBC # BLD: 11.98 K/UL — HIGH (ref 3.8–10.5)
WBC # BLD: 12.7 K/UL — HIGH (ref 3.8–10.5)
WBC # FLD AUTO: 11.98 K/UL — HIGH (ref 3.8–10.5)
WBC # FLD AUTO: 12.7 K/UL — HIGH (ref 3.8–10.5)

## 2019-08-22 RX ORDER — FOLIC ACID 0.8 MG
1 TABLET ORAL DAILY
Refills: 0 | Status: DISCONTINUED | OUTPATIENT
Start: 2019-08-22 | End: 2019-08-23

## 2019-08-22 RX ORDER — FERROUS SULFATE 325(65) MG
325 TABLET ORAL DAILY
Refills: 0 | Status: DISCONTINUED | OUTPATIENT
Start: 2019-08-22 | End: 2019-08-23

## 2019-08-22 RX ADMIN — SODIUM CHLORIDE 50 MILLILITER(S): 9 INJECTION INTRAMUSCULAR; INTRAVENOUS; SUBCUTANEOUS at 06:49

## 2019-08-22 RX ADMIN — Medication 100 MILLIGRAM(S): at 22:16

## 2019-08-22 RX ADMIN — Medication 1 TABLET(S): at 11:57

## 2019-08-22 RX ADMIN — Medication 975 MILLIGRAM(S): at 07:11

## 2019-08-22 RX ADMIN — AMLODIPINE BESYLATE 5 MILLIGRAM(S): 2.5 TABLET ORAL at 22:16

## 2019-08-22 RX ADMIN — ENOXAPARIN SODIUM 30 MILLIGRAM(S): 100 INJECTION SUBCUTANEOUS at 11:57

## 2019-08-22 RX ADMIN — Medication 100 MILLIGRAM(S): at 02:44

## 2019-08-22 RX ADMIN — Medication 100 MILLIGRAM(S): at 06:42

## 2019-08-22 RX ADMIN — Medication 975 MILLIGRAM(S): at 14:07

## 2019-08-22 RX ADMIN — Medication 975 MILLIGRAM(S): at 22:21

## 2019-08-22 RX ADMIN — Medication 975 MILLIGRAM(S): at 06:41

## 2019-08-22 RX ADMIN — ATORVASTATIN CALCIUM 20 MILLIGRAM(S): 80 TABLET, FILM COATED ORAL at 22:16

## 2019-08-22 RX ADMIN — Medication 100 MILLIGRAM(S): at 14:07

## 2019-08-22 RX ADMIN — POLYETHYLENE GLYCOL 3350 17 GRAM(S): 17 POWDER, FOR SOLUTION ORAL at 11:57

## 2019-08-22 RX ADMIN — Medication 975 MILLIGRAM(S): at 14:37

## 2019-08-22 RX ADMIN — SENNA PLUS 2 TABLET(S): 8.6 TABLET ORAL at 22:16

## 2019-08-22 RX ADMIN — Medication 81 MILLIGRAM(S): at 11:57

## 2019-08-22 RX ADMIN — Medication 975 MILLIGRAM(S): at 22:16

## 2019-08-22 NOTE — PROGRESS NOTE ADULT - SUBJECTIVE AND OBJECTIVE BOX
Patient seen and examined. Pain controlled. No acute events overnight. very [place]. daughter at bedside, per nurse no events overnight, just finished 2nd 1/2 unit of prbc without reaction/problem.       HEALTH ISSUES - PROBLEM Dx:  Osteoporosis: Osteoporosis  History of Prostate Cancer: History of Prostate Cancer  Hypertension: Hypertension  Closed fracture of left hip, initial encounter: Closed fracture of left hip, initial encounter        MEDICATIONS  (STANDING):  acetaminophen   Tablet .. 975 milliGRAM(s) Oral every 8 hours  amLODIPine   Tablet 5 milliGRAM(s) Oral daily  aspirin enteric coated 81 milliGRAM(s) Oral daily  atorvastatin 20 milliGRAM(s) Oral at bedtime  calcium carbonate 1250 mG  + Vitamin D (OsCal 500 + D) 1 Tablet(s) Oral daily  docusate sodium 100 milliGRAM(s) Oral three times a day  enoxaparin Injectable 30 milliGRAM(s) SubCutaneous daily  furosemide    Tablet 20 milliGRAM(s) Oral once  lisinopril 10 milliGRAM(s) Oral daily  polyethylene glycol 3350 17 Gram(s) Oral daily  potassium chloride    Tablet ER 20 milliEquivalent(s) Oral once  senna 2 Tablet(s) Oral at bedtime  sodium chloride 0.9%. 1000 milliLiter(s) IV Continuous <Continuous>    Allergies    No Known Allergies    Intolerances      PT/INR - ( 21 Aug 2019 04:41 )   PT: 12.1 sec;   INR: 1.05 ratio         PTT - ( 21 Aug 2019 04:41 )  PTT:26.8 sec                        8.8    18.5  )-----------( 178      ( 21 Aug 2019 20:30 )             25.6     21 Aug 2019 13:46    133    |  103    |  18     ----------------------------<  119    4.4     |  19     |  0.80     Ca    8.3        21 Aug 2019 13:46    TPro  6.2    /  Alb  4.2    /  TBili  0.4    /  DBili  x      /  AST  16     /  ALT  14     /  AlkPhos  49     20 Aug 2019 17:06      Vital Signs Last 24 Hrs  T(C): 37 (08-22-19 @ 06:37), Max: 37.4 (08-21-19 @ 16:14)  T(F): 98.6 (08-22-19 @ 06:37), Max: 99.3 (08-21-19 @ 16:14)  HR: 83 (08-22-19 @ 06:37) (74 - 102)  BP: 99/62 (08-22-19 @ 06:37) (86/54 - 145/66)  BP(mean): 87 (08-21-19 @ 15:30) (66 - 98)  RR: 18 (08-22-19 @ 06:37) (14 - 20)  SpO2: 96% (08-22-19 @ 06:37) (94% - 100%)  Gen: NAD  LLE:  Dressing c/d/i  +ehl/fhl/ta/gs function  l2-s1 SILT  dp/pt pulse intact  no calf ttp  compartments soft    A/P: 89y Male sp L Hip IMN POD 1  pain control  dvt ppx  PT/OOBTC/WBAT with assistive devices as needed  Ice  FU Labs  Incentive spirometry   Medical management  Dispo planning

## 2019-08-22 NOTE — PHYSICAL THERAPY INITIAL EVALUATION ADULT - ADDITIONAL COMMENTS
pt lives at home with spouse and dtr and son with family; spouse recently hip replacement and dementia +HHA for spouse; prior to this fall, pt was amb ind, use of cane outdoors; assist as needed for ADLs, +glasses.

## 2019-08-22 NOTE — PHYSICAL THERAPY INITIAL EVALUATION ADULT - PERTINENT HX OF CURRENT PROBLEM, REHAB EVAL
Pt is a 89y Male admitted to [facility] on 8/20/19 hx of R femur IMN by Dr. Anderson in 2015 presents s/p mech fall c/o severe L hip pain and inability to ambulate.  Pt denies headstrike or LOC. Pt denies radiation of pain. Pt denies numbness/tingling/burning in the LLE. No other bone/joint complaints. Pt is a community ambulator at baseline with/without assistive devices. Pt is s/p L hip IMN 8/21/19. Per ortho, WBAT LLE, very [place].  finished 2nd 1/2 unit of prbc without reaction/problem.

## 2019-08-22 NOTE — PHYSICAL THERAPY INITIAL EVALUATION ADULT - ACTIVE RANGE OF MOTION EXAMINATION, REHAB EVAL
BUE and RLE AROM WFL, LLE AAROM WFL, pain to LLE/Left LE Active ROM was WFL (within functional limits)/Left UE Active ROM was WFL (within functional limits)/Right LE Active ROM was WFL (within functional limits)/Right UE Active ROM was WFL (within functional limits)

## 2019-08-22 NOTE — PROGRESS NOTE ADULT - SUBJECTIVE AND OBJECTIVE BOX
89 male ground level fall wo loc or head strike on asa 81, now w L hip pain mod const sharp , Patient was briught to [facility] where he was found to have a left hip fx. Patient seen now resting comfortably after an Open reduction and internal fixation of the left hip. Patient seen resting comfortably. has been participating with physical therapy       MEDICATIONS  (STANDING):  acetaminophen   Tablet .. 975 milliGRAM(s) Oral every 8 hours  amLODIPine   Tablet 5 milliGRAM(s) Oral daily  aspirin enteric coated 81 milliGRAM(s) Oral daily  atorvastatin 20 milliGRAM(s) Oral at bedtime  calcium carbonate 1250 mG  + Vitamin D (OsCal 500 + D) 1 Tablet(s) Oral daily  docusate sodium 100 milliGRAM(s) Oral three times a day  enoxaparin Injectable 30 milliGRAM(s) SubCutaneous daily  ferrous    sulfate 325 milliGRAM(s) Oral daily  folic acid 1 milliGRAM(s) Oral daily  lisinopril 10 milliGRAM(s) Oral daily  multivitamin 1 Tablet(s) Oral daily  polyethylene glycol 3350 17 Gram(s) Oral daily  senna 2 Tablet(s) Oral at bedtime    MEDICATIONS  (PRN):  benzocaine 15 mG/menthol 3.6 mG Lozenge 1 Lozenge Oral every 2 hours PRN Sore Throat  diphenhydrAMINE 25 milliGRAM(s) Oral every 4 hours PRN Rash and/or Itching  haloperidol    Injectable 0.5 milliGRAM(s) IV Push once PRN severe agitation  magnesium hydroxide Suspension 30 milliLiter(s) Oral daily PRN Constipation  melatonin 3 milliGRAM(s) Oral at bedtime PRN Insomnia  ondansetron Injectable 4 milliGRAM(s) IV Push every 6 hours PRN Nausea and/or Vomiting  oxyCODONE    IR 2.5 milliGRAM(s) Oral every 6 hours PRN Severe Pain (7 - 10)  traMADol 25 milliGRAM(s) Oral every 4 hours PRN Moderate Pain (4 - 6)          VITALS:   T(C): 37.1 (08-22-19 @ 20:31), Max: 37.1 (08-22-19 @ 20:31)  HR: 93 (08-22-19 @ 20:31) (81 - 99)  BP: 121/72 (08-22-19 @ 20:31) (99/62 - 121/72)  RR: 18 (08-22-19 @ 20:31) (18 - 18)  SpO2: 94% (08-22-19 @ 20:31) (94% - 96%)  Wt(kg): --        LABS:        CBC Full  -  ( 22 Aug 2019 15:46 )  WBC Count : 12.7 K/uL  RBC Count : 2.89 M/uL  Hemoglobin : 8.9 g/dL  Hematocrit : 26.4 %  Platelet Count - Automated : 155 K/uL  Mean Cell Volume : 91.2 fl  Mean Cell Hemoglobin : 30.9 pg  Mean Cell Hemoglobin Concentration : 33.8 gm/dL  Auto Neutrophil # : x  Auto Lymphocyte # : x  Auto Monocyte # : x  Auto Eosinophil # : x  Auto Basophil # : x  Auto Neutrophil % : x  Auto Lymphocyte % : x  Auto Monocyte % : x  Auto Eosinophil % : x  Auto Basophil % : x    08-22    132<L>  |  102  |  21  ----------------------------<  146<H>  4.0   |  21<L>  |  0.97    Ca    7.8<L>      22 Aug 2019 07:00        PT/INR - ( 21 Aug 2019 04:41 )   PT: 12.1 sec;   INR: 1.05 ratio         PTT - ( 21 Aug 2019 04:41 )  PTT:26.8 sec    CAPILLARY BLOOD GLUCOSE          RADIOLOGY & ADDITIONAL TESTS:

## 2019-08-22 NOTE — PHYSICAL THERAPY INITIAL EVALUATION ADULT - GENERAL OBSERVATIONS, REHAB EVAL
Pt is A&Ox4, forgetful at times, very [place] (hearing aid batteries not working), dtr Luna at bedside assisting wtih communicating- writing down commands. Pt is s/p L IMN, WBAT.

## 2019-08-23 ENCOUNTER — TRANSCRIPTION ENCOUNTER (OUTPATIENT)
Age: 84
End: 2019-08-23

## 2019-08-23 VITALS
TEMPERATURE: 99 F | OXYGEN SATURATION: 98 % | DIASTOLIC BLOOD PRESSURE: 69 MMHG | RESPIRATION RATE: 22 BRPM | HEART RATE: 76 BPM | SYSTOLIC BLOOD PRESSURE: 119 MMHG

## 2019-08-23 LAB
ANION GAP SERPL CALC-SCNC: 9 MMOL/L — SIGNIFICANT CHANGE UP (ref 5–17)
BUN SERPL-MCNC: 24 MG/DL — HIGH (ref 7–23)
CALCIUM SERPL-MCNC: 7.5 MG/DL — LOW (ref 8.4–10.5)
CHLORIDE SERPL-SCNC: 102 MMOL/L — SIGNIFICANT CHANGE UP (ref 96–108)
CO2 SERPL-SCNC: 21 MMOL/L — LOW (ref 22–31)
CREAT SERPL-MCNC: 0.87 MG/DL — SIGNIFICANT CHANGE UP (ref 0.5–1.3)
GLUCOSE SERPL-MCNC: 123 MG/DL — HIGH (ref 70–99)
HCT VFR BLD CALC: 24.3 % — LOW (ref 39–50)
HGB BLD-MCNC: 7.8 G/DL — LOW (ref 13–17)
MCHC RBC-ENTMCNC: 29.1 PG — SIGNIFICANT CHANGE UP (ref 27–34)
MCHC RBC-ENTMCNC: 32 GM/DL — SIGNIFICANT CHANGE UP (ref 32–36)
MCV RBC AUTO: 90.9 FL — SIGNIFICANT CHANGE UP (ref 80–100)
PLATELET # BLD AUTO: 148 K/UL — LOW (ref 150–400)
POTASSIUM SERPL-MCNC: 4.1 MMOL/L — SIGNIFICANT CHANGE UP (ref 3.5–5.3)
POTASSIUM SERPL-SCNC: 4.1 MMOL/L — SIGNIFICANT CHANGE UP (ref 3.5–5.3)
RBC # BLD: 2.68 M/UL — LOW (ref 4.2–5.8)
RBC # FLD: 12 % — SIGNIFICANT CHANGE UP (ref 10.3–14.5)
SODIUM SERPL-SCNC: 132 MMOL/L — LOW (ref 135–145)
WBC # BLD: 11.1 K/UL — HIGH (ref 3.8–10.5)
WBC # FLD AUTO: 11.1 K/UL — HIGH (ref 3.8–10.5)

## 2019-08-23 PROCEDURE — 86900 BLOOD TYPING SEROLOGIC ABO: CPT

## 2019-08-23 PROCEDURE — 99285 EMERGENCY DEPT VISIT HI MDM: CPT | Mod: 25

## 2019-08-23 PROCEDURE — 85027 COMPLETE CBC AUTOMATED: CPT

## 2019-08-23 PROCEDURE — 93005 ELECTROCARDIOGRAM TRACING: CPT

## 2019-08-23 PROCEDURE — G0103: CPT

## 2019-08-23 PROCEDURE — 73552 X-RAY EXAM OF FEMUR 2/>: CPT

## 2019-08-23 PROCEDURE — 86901 BLOOD TYPING SEROLOGIC RH(D): CPT

## 2019-08-23 PROCEDURE — 96374 THER/PROPH/DIAG INJ IV PUSH: CPT

## 2019-08-23 PROCEDURE — C1713: CPT

## 2019-08-23 PROCEDURE — 97110 THERAPEUTIC EXERCISES: CPT

## 2019-08-23 PROCEDURE — P9011: CPT

## 2019-08-23 PROCEDURE — 97530 THERAPEUTIC ACTIVITIES: CPT

## 2019-08-23 PROCEDURE — 90471 IMMUNIZATION ADMIN: CPT

## 2019-08-23 PROCEDURE — C1769: CPT

## 2019-08-23 PROCEDURE — 86850 RBC ANTIBODY SCREEN: CPT

## 2019-08-23 PROCEDURE — 86923 COMPATIBILITY TEST ELECTRIC: CPT

## 2019-08-23 PROCEDURE — 80053 COMPREHEN METABOLIC PANEL: CPT

## 2019-08-23 PROCEDURE — C1889: CPT

## 2019-08-23 PROCEDURE — 85730 THROMBOPLASTIN TIME PARTIAL: CPT

## 2019-08-23 PROCEDURE — 76000 FLUOROSCOPY <1 HR PHYS/QHP: CPT

## 2019-08-23 PROCEDURE — 73120 X-RAY EXAM OF HAND: CPT

## 2019-08-23 PROCEDURE — 80048 BASIC METABOLIC PNL TOTAL CA: CPT

## 2019-08-23 PROCEDURE — 97162 PT EVAL MOD COMPLEX 30 MIN: CPT

## 2019-08-23 PROCEDURE — 85610 PROTHROMBIN TIME: CPT

## 2019-08-23 PROCEDURE — 90715 TDAP VACCINE 7 YRS/> IM: CPT

## 2019-08-23 PROCEDURE — 73070 X-RAY EXAM OF ELBOW: CPT

## 2019-08-23 PROCEDURE — 72170 X-RAY EXAM OF PELVIS: CPT

## 2019-08-23 PROCEDURE — 73502 X-RAY EXAM HIP UNI 2-3 VIEWS: CPT

## 2019-08-23 PROCEDURE — P9016: CPT

## 2019-08-23 PROCEDURE — 71045 X-RAY EXAM CHEST 1 VIEW: CPT

## 2019-08-23 PROCEDURE — 36430 TRANSFUSION BLD/BLD COMPNT: CPT

## 2019-08-23 RX ORDER — SENNA PLUS 8.6 MG/1
2 TABLET ORAL
Qty: 0 | Refills: 0 | DISCHARGE
Start: 2019-08-23

## 2019-08-23 RX ORDER — ACETAMINOPHEN 500 MG
3 TABLET ORAL
Qty: 0 | Refills: 0 | DISCHARGE
Start: 2019-08-23

## 2019-08-23 RX ORDER — TRAMADOL HYDROCHLORIDE 50 MG/1
0.5 TABLET ORAL
Qty: 0 | Refills: 0 | DISCHARGE
Start: 2019-08-23

## 2019-08-23 RX ORDER — LISINOPRIL 2.5 MG/1
1 TABLET ORAL
Qty: 0 | Refills: 0 | DISCHARGE
Start: 2019-08-23

## 2019-08-23 RX ORDER — OXYCODONE HYDROCHLORIDE 5 MG/1
0.5 TABLET ORAL
Qty: 14 | Refills: 0
Start: 2019-08-23 | End: 2019-08-29

## 2019-08-23 RX ORDER — ENOXAPARIN SODIUM 100 MG/ML
30 INJECTION SUBCUTANEOUS
Qty: 0 | Refills: 0 | DISCHARGE
Start: 2019-08-23

## 2019-08-23 RX ORDER — LANOLIN ALCOHOL/MO/W.PET/CERES
1 CREAM (GRAM) TOPICAL
Qty: 0 | Refills: 0 | DISCHARGE
Start: 2019-08-23

## 2019-08-23 RX ORDER — ASPIRIN/CALCIUM CARB/MAGNESIUM 324 MG
1 TABLET ORAL
Qty: 0 | Refills: 0 | DISCHARGE
Start: 2019-08-23

## 2019-08-23 RX ADMIN — Medication 975 MILLIGRAM(S): at 06:03

## 2019-08-23 RX ADMIN — Medication 100 MILLIGRAM(S): at 05:59

## 2019-08-23 RX ADMIN — Medication 81 MILLIGRAM(S): at 12:17

## 2019-08-23 RX ADMIN — Medication 1 MILLIGRAM(S): at 12:18

## 2019-08-23 RX ADMIN — ENOXAPARIN SODIUM 30 MILLIGRAM(S): 100 INJECTION SUBCUTANEOUS at 12:17

## 2019-08-23 RX ADMIN — Medication 1 TABLET(S): at 12:18

## 2019-08-23 RX ADMIN — Medication 975 MILLIGRAM(S): at 16:41

## 2019-08-23 RX ADMIN — LISINOPRIL 10 MILLIGRAM(S): 2.5 TABLET ORAL at 06:00

## 2019-08-23 RX ADMIN — Medication 100 MILLIGRAM(S): at 16:41

## 2019-08-23 RX ADMIN — Medication 975 MILLIGRAM(S): at 06:00

## 2019-08-23 RX ADMIN — Medication 325 MILLIGRAM(S): at 12:17

## 2019-08-23 RX ADMIN — POLYETHYLENE GLYCOL 3350 17 GRAM(S): 17 POWDER, FOR SOLUTION ORAL at 12:17

## 2019-08-23 NOTE — DISCHARGE NOTE PROVIDER - NSDCCPCAREPLAN_GEN_ALL_CORE_FT
PRINCIPAL DISCHARGE DIAGNOSIS  Diagnosis: Closed fracture of left hip, initial encounter  Assessment and Plan of Treatment:

## 2019-08-23 NOTE — PROGRESS NOTE ADULT - PROVIDER SPECIALTY LIST ADULT
Internal Medicine
Orthopedics
Internal Medicine
Internal Medicine

## 2019-08-23 NOTE — PROGRESS NOTE ADULT - REASON FOR ADMISSION
Left hip fracture
Left hip fracture s/p IMN
Left hip fracture
Left hip fracture

## 2019-08-23 NOTE — PROGRESS NOTE ADULT - ASSESSMENT
A/P: 89y Male sp L Hip IMN POD 2  -discharge to subacute rehab today  -AM labs pending  -pain control  -Lovenox  -PT/OOBTC/WBAT with assistive devices as needed  -Ice  -Incentive spirometry

## 2019-08-23 NOTE — PROGRESS NOTE ADULT - PROBLEM SELECTOR PLAN 3
follow for signs or urinary retention  Patient has not had any issues
follow for signs or urinary retention
follow for signs or urinary retention  Patient has not had any issues

## 2019-08-23 NOTE — DISCHARGE NOTE PROVIDER - NSDCACTIVITY_GEN_ALL_CORE
Stairs allowed/Walking - Outdoors allowed/Do not drive or operate machinery/Walking - Indoors allowed/No heavy lifting/straining/Do not make important decisions

## 2019-08-23 NOTE — DISCHARGE NOTE PROVIDER - NSDCFUADDINST_GEN_ALL_CORE_FT
Follow up with Dr. Anderson upon discharge from Rehab. Please call to make appointment.  Activity: Weight Bearing as tolerated on left leg.  DVT ppx: Lovenox 30mg SQ Daily for blood clot prevention.  Dressing: Keep clean and dry. Rehab staff may remove dressing and staples on Postoperative Day #14 and apply steristrips.

## 2019-08-23 NOTE — DISCHARGE NOTE NURSING/CASE MANAGEMENT/SOCIAL WORK - NSDCDPATPORTLINK_GEN_ALL_CORE
You can access the [facility] Patient Portal, offered by [facility], by registering with the following website: [email or web address]

## 2019-08-23 NOTE — PROGRESS NOTE ADULT - PROBLEM SELECTOR PLAN 4
will need further follow up as an out pt

## 2019-08-23 NOTE — PROGRESS NOTE ADULT - SUBJECTIVE AND OBJECTIVE BOX
Subjective:  Patient seen and examined. Pain controlled. No acute events overnight. Patient received 1united of PRBCs on 8/21 without reaction/problem.     Objective:  Vital Signs Last 24 Hrs  T(C): 36.9 (23 Aug 2019 05:04), Max: 37.1 (22 Aug 2019 20:31)  T(F): 98.5 (23 Aug 2019 05:04), Max: 98.8 (22 Aug 2019 20:31)  HR: 88 (23 Aug 2019 05:04) (82 - 93)  BP: 111/69 (23 Aug 2019 05:04) (109/69 - 121/72)  BP(mean): --  RR: 18 (23 Aug 2019 05:04) (18 - 18)  SpO2: 94% (23 Aug 2019 05:04) (94% - 96%)    08-22    132<L>  |  102  |  21  ----------------------------<  146<H>  4.0   |  21<L>  |  0.97    Ca    7.8<L>      22 Aug 2019 07:00                            8.9    12.7  )-----------( 155      ( 22 Aug 2019 15:46 )             26.4     MEDICATIONS  (STANDING):  acetaminophen   Tablet .. 975 milliGRAM(s) Oral every 8 hours  amLODIPine   Tablet 5 milliGRAM(s) Oral daily  aspirin enteric coated 81 milliGRAM(s) Oral daily  atorvastatin 20 milliGRAM(s) Oral at bedtime  calcium carbonate 1250 mG  + Vitamin D (OsCal 500 + D) 1 Tablet(s) Oral daily  docusate sodium 100 milliGRAM(s) Oral three times a day  enoxaparin Injectable 30 milliGRAM(s) SubCutaneous daily  ferrous    sulfate 325 milliGRAM(s) Oral daily  folic acid 1 milliGRAM(s) Oral daily  lisinopril 10 milliGRAM(s) Oral daily  multivitamin 1 Tablet(s) Oral daily  polyethylene glycol 3350 17 Gram(s) Oral daily  senna 2 Tablet(s) Oral at bedtime    PHYSICAL EXAM:  Gen: NAD, AAOx3  Left Lower Extremity:  Dressing clean dry intact  +EHL/FHL/TA/GS  SILT L3-S1  +DP/PT Pulses  Compartments soft  No calf TTP B/L

## 2019-08-23 NOTE — PROGRESS NOTE ADULT - PROBLEM SELECTOR PLAN 1
continue physical therapy   pain is well controlled  PO as tolerated  DVT and GI prophylaxis
tolerated procedure well   pain is well controlled  will need physical therapy  PO as tolerated
continue physical therapy   pain is well controlled  PO as tolerated  Start PT  DVT and GI prophylaxis

## 2019-08-23 NOTE — DISCHARGE NOTE PROVIDER - HOSPITAL COURSE
History of Present Illness:     89y Male hx of R femur IMN by Dr. Anderson in 2015 presents s/p mechanical fall c/o severe L hip pain and inability to ambulate.  Patient denies headstrike or LOC. Patient denies radiation of pain. Patient denies numbness/tingling/burning in the LLE. No other bone/joint complaints. Patient is a community ambulator at baseline with/without assistive devices.        PAST MEDICAL & SURGICAL HISTORY:    Osteoporosis    HLD (hyperlipidemia)    Hypertension    History of Prostate Cancer    H/O arthroplasty: Left shoulder.    S/P Prostatectomy    S/P Appendectomy        Hospital Course:    Patient presented to [facility] Emergency Room s/p Mechanical Fall. Patient     found to have a left intertrochanteric fracture in need of surgical fixation. Seen by Medicine Team    and cleared for surgery. Underwent a left femur intramedullary nail without complications. Postoperatively received 1 unit of blood for postoperative anemia. Evaluated and treated by Physical Therapy whom recommended Rehab for discharge disposition. Discharged to Rehab when bed available.

## 2019-08-23 NOTE — PROGRESS NOTE ADULT - PROBLEM SELECTOR PLAN 2
continue amlodapine and losartan  adjust med as needed  continue to  monitor BP

## 2019-08-23 NOTE — PROGRESS NOTE ADULT - SUBJECTIVE AND OBJECTIVE BOX
89 male ground level fall wo loc or head strike on asa 81, now w L hip pain mod const sharp , Patient was briught to [facility] where he was found to have a left hip fx. Patient seen now resting comfortably after an Open reduction and internal fixation of the left hip. Patient seen resting comfortably. has been participating with physical therapy .  Medically stable to proceed with discharge when ready orthopedically.      MEDICATIONS  (STANDING):  acetaminophen   Tablet .. 975 milliGRAM(s) Oral every 8 hours  amLODIPine   Tablet 5 milliGRAM(s) Oral daily  aspirin enteric coated 81 milliGRAM(s) Oral daily  atorvastatin 20 milliGRAM(s) Oral at bedtime  calcium carbonate 1250 mG  + Vitamin D (OsCal 500 + D) 1 Tablet(s) Oral daily  docusate sodium 100 milliGRAM(s) Oral three times a day  enoxaparin Injectable 30 milliGRAM(s) SubCutaneous daily  ferrous    sulfate 325 milliGRAM(s) Oral daily  folic acid 1 milliGRAM(s) Oral daily  lisinopril 10 milliGRAM(s) Oral daily  multivitamin 1 Tablet(s) Oral daily  polyethylene glycol 3350 17 Gram(s) Oral daily  senna 2 Tablet(s) Oral at bedtime    MEDICATIONS  (PRN):  benzocaine 15 mG/menthol 3.6 mG Lozenge 1 Lozenge Oral every 2 hours PRN Sore Throat  diphenhydrAMINE 25 milliGRAM(s) Oral every 4 hours PRN Rash and/or Itching  haloperidol    Injectable 0.5 milliGRAM(s) IV Push once PRN severe agitation  magnesium hydroxide Suspension 30 milliLiter(s) Oral daily PRN Constipation  melatonin 3 milliGRAM(s) Oral at bedtime PRN Insomnia  ondansetron Injectable 4 milliGRAM(s) IV Push every 6 hours PRN Nausea and/or Vomiting  oxyCODONE    IR 2.5 milliGRAM(s) Oral every 6 hours PRN Severe Pain (7 - 10)  traMADol 25 milliGRAM(s) Oral every 4 hours PRN Moderate Pain (4 - 6)      Vital Signs Last 24 Hrs  T(C): 36.9 (23 Aug 2019 05:04), Max: 37.1 (22 Aug 2019 20:31)  T(F): 98.5 (23 Aug 2019 05:04), Max: 98.8 (22 Aug 2019 20:31)  HR: 88 (23 Aug 2019 05:04) (82 - 93)  BP: 111/69 (23 Aug 2019 05:04) (109/69 - 121/72)  BP(mean): --  RR: 18 (23 Aug 2019 05:04) (18 - 18)  SpO2: 94% (23 Aug 2019 05:04) (94% - 96%)      Gen: NAD, AAOx3  Left Lower Extremity:  Dressing clean dry intact  +EHL/FHL/TA/GS  SILT L3-S1  +DP/PT Pulses  Compartments soft  No calf TTP B/L        LABS:    Hgn 7.8  PSA 60.7    CBC Full  -  ( 22 Aug 2019 15:46 )  WBC Count : 12.7 K/uL  RBC Count : 2.89 M/uL  Hemoglobin : 8.9 g/dL  Hematocrit : 26.4 %  Platelet Count - Automated : 155 K/uL  Mean Cell Volume : 91.2 fl  Mean Cell Hemoglobin : 30.9 pg  Mean Cell Hemoglobin Concentration : 33.8 gm/dL  Auto Neutrophil # : x  Auto Lymphocyte # : x  Auto Monocyte # : x  Auto Eosinophil # : x  Auto Basophil # : x  Auto Neutrophil % : x  Auto Lymphocyte % : x  Auto Monocyte % : x  Auto Eosinophil % : x  Auto Basophil % : x    08-22    132<L>  |  102  |  21  ----------------------------<  146<H>  4.0   |  21<L>  |  0.97    Ca    7.8<L>      22 Aug 2019 07:00        PT/INR - ( 21 Aug 2019 04:41 )   PT: 12.1 sec;   INR: 1.05 ratio         PTT - ( 21 Aug 2019 04:41 )  PTT:26.8 sec    CAPILLARY BLOOD GLUCOSE          RADIOLOGY & ADDITIONAL TESTS:

## 2019-08-23 NOTE — DISCHARGE NOTE PROVIDER - CARE PROVIDER_API CALL
Joni Anderson)  Orthopaedic Surgery  [address]  [place] [address]  Phone: [phone]  Fax: [phone]  Follow Up Time:

## 2019-09-07 ENCOUNTER — INPATIENT (INPATIENT)
Facility: HOSPITAL | Age: 84
LOS: 4 days | Discharge: NURSING, REHAB OR LONG-TERM CARE | DRG: 644 | End: 2019-09-12
Attending: HOSPITALIST | Admitting: EMERGENCY MEDICINE
Payer: MEDICARE

## 2019-09-07 VITALS
TEMPERATURE: 98 F | SYSTOLIC BLOOD PRESSURE: 109 MMHG | OXYGEN SATURATION: 97 % | DIASTOLIC BLOOD PRESSURE: 60 MMHG | RESPIRATION RATE: 16 BRPM | HEART RATE: 84 BPM | WEIGHT: 149.91 LBS

## 2019-09-07 LAB
ALBUMIN SERPL ELPH-MCNC: 3.4 G/DL — SIGNIFICANT CHANGE UP (ref 3.3–5)
ALP SERPL-CCNC: 107 U/L — SIGNIFICANT CHANGE UP (ref 40–120)
ALT FLD-CCNC: 23 U/L — SIGNIFICANT CHANGE UP (ref 10–45)
ANION GAP SERPL CALC-SCNC: 16 MMOL/L — SIGNIFICANT CHANGE UP (ref 5–17)
APTT BLD: 28.2 SEC — SIGNIFICANT CHANGE UP (ref 27.5–36.3)
AST SERPL-CCNC: 23 U/L — SIGNIFICANT CHANGE UP (ref 10–40)
BASOPHILS # BLD AUTO: 0.1 K/UL — SIGNIFICANT CHANGE UP (ref 0–0.2)
BASOPHILS NFR BLD AUTO: 0.6 % — SIGNIFICANT CHANGE UP (ref 0–2)
BILIRUB SERPL-MCNC: 0.9 MG/DL — SIGNIFICANT CHANGE UP (ref 0.2–1.2)
BUN SERPL-MCNC: 19 MG/DL — SIGNIFICANT CHANGE UP (ref 7–23)
CALCIUM SERPL-MCNC: 8.3 MG/DL — LOW (ref 8.4–10.5)
CHLORIDE SERPL-SCNC: 81 MMOL/L — LOW (ref 96–108)
CO2 SERPL-SCNC: 19 MMOL/L — LOW (ref 22–31)
CREAT SERPL-MCNC: 0.6 MG/DL — SIGNIFICANT CHANGE UP (ref 0.5–1.3)
EOSINOPHIL # BLD AUTO: 0.2 K/UL — SIGNIFICANT CHANGE UP (ref 0–0.5)
EOSINOPHIL NFR BLD AUTO: 1.5 % — SIGNIFICANT CHANGE UP (ref 0–6)
GLUCOSE SERPL-MCNC: 111 MG/DL — HIGH (ref 70–99)
HCT VFR BLD CALC: 30.4 % — LOW (ref 39–50)
HGB BLD-MCNC: 9.9 G/DL — LOW (ref 13–17)
INR BLD: 1.07 RATIO — SIGNIFICANT CHANGE UP (ref 0.88–1.16)
LYMPHOCYTES # BLD AUTO: 0.6 K/UL — LOW (ref 1–3.3)
LYMPHOCYTES # BLD AUTO: 4.2 % — LOW (ref 13–44)
MAGNESIUM SERPL-MCNC: 1.9 MG/DL — SIGNIFICANT CHANGE UP (ref 1.6–2.6)
MCHC RBC-ENTMCNC: 29.6 PG — SIGNIFICANT CHANGE UP (ref 27–34)
MCHC RBC-ENTMCNC: 32.6 GM/DL — SIGNIFICANT CHANGE UP (ref 32–36)
MCV RBC AUTO: 90.9 FL — SIGNIFICANT CHANGE UP (ref 80–100)
MONOCYTES # BLD AUTO: 1.1 K/UL — HIGH (ref 0–0.9)
MONOCYTES NFR BLD AUTO: 8.3 % — SIGNIFICANT CHANGE UP (ref 2–14)
NEUTROPHILS # BLD AUTO: 11.7 K/UL — HIGH (ref 1.8–7.4)
NEUTROPHILS NFR BLD AUTO: 85.3 % — HIGH (ref 43–77)
NT-PROBNP SERPL-SCNC: HIGH PG/ML (ref 0–300)
OSMOLALITY SERPL: 143 MOSMOL/KG — LOW (ref 280–301)
OSMOLALITY UR: 614 MOS/KG — SIGNIFICANT CHANGE UP (ref 300–900)
PLATELET # BLD AUTO: 513 K/UL — HIGH (ref 150–400)
POTASSIUM SERPL-MCNC: 5 MMOL/L — SIGNIFICANT CHANGE UP (ref 3.5–5.3)
POTASSIUM SERPL-SCNC: 5 MMOL/L — SIGNIFICANT CHANGE UP (ref 3.5–5.3)
PROT SERPL-MCNC: 6 G/DL — SIGNIFICANT CHANGE UP (ref 6–8.3)
PROTHROM AB SERPL-ACNC: 12.2 SEC — SIGNIFICANT CHANGE UP (ref 10–12.9)
RBC # BLD: 3.34 M/UL — LOW (ref 4.2–5.8)
RBC # FLD: 12.6 % — SIGNIFICANT CHANGE UP (ref 10.3–14.5)
SODIUM SERPL-SCNC: 116 MMOL/L — CRITICAL LOW (ref 135–145)
SODIUM UR-SCNC: 34 MMOL/L — SIGNIFICANT CHANGE UP
TROPONIN T, HIGH SENSITIVITY RESULT: 36 NG/L — SIGNIFICANT CHANGE UP (ref 0–51)
WBC # BLD: 13.7 K/UL — HIGH (ref 3.8–10.5)
WBC # FLD AUTO: 13.7 K/UL — HIGH (ref 3.8–10.5)

## 2019-09-07 PROCEDURE — 93010 ELECTROCARDIOGRAM REPORT: CPT | Mod: GC

## 2019-09-07 PROCEDURE — 99285 EMERGENCY DEPT VISIT HI MDM: CPT | Mod: GC

## 2019-09-07 PROCEDURE — 99223 1ST HOSP IP/OBS HIGH 75: CPT | Mod: GC

## 2019-09-07 PROCEDURE — 71045 X-RAY EXAM CHEST 1 VIEW: CPT | Mod: 26

## 2019-09-07 RX ORDER — FUROSEMIDE 40 MG
20 TABLET ORAL ONCE
Refills: 0 | Status: COMPLETED | OUTPATIENT
Start: 2019-09-07 | End: 2019-09-07

## 2019-09-07 RX ADMIN — Medication 20 MILLIGRAM(S): at 19:38

## 2019-09-07 NOTE — ED ADULT TRIAGE NOTE - NS ED NURSE BANDS TYPE
Name band;
< from: 12 Lead ECG (05.23.18 @ 07:43) >  Ventricular Rate 64 BPM  Atrial Rate 64 BPM  QRS Duration 80 ms   ms  QTc 455 ms  R Axis 66 degrees  T Axis 43 degrees  Diagnosis Line JUNCTIONAL RHYTHM, ABNORMAL ECG

## 2019-09-07 NOTE — ED ADULT NURSE NOTE - OBJECTIVE STATEMENT
BIBA from rehab facility accompanied by daughter. Patient is [place] but oriented to person, place and time/date. Patient states he is in the ED for "salt deficiency". As per daughter Luna, patient has blood work drawn yesterday which revealed low sodium level. The blood work was repeated and came back with low sodium level again. Patient's daughter endorses that the patient seemed more forgetful than usual, asking repeatedly at time what the time and date is. Patient has previous hospitalizations for hyponatremia in the past. Cough is noted, and daughter states that is chronic for patient due to post nasal drip. Denies ay fever or chills, nausea, vomiting, diarrhea, or abdominal pain. Patient is in rehab s/p left hip fx, surgical site is clean and dry. BLE edema is noted.

## 2019-09-07 NOTE — ED PROVIDER NOTE - ATTENDING CONTRIBUTION TO CARE
89y M sent from [facility] for low sodium. Pt with his dtr. State in [facility] post L hip replacement. Has been on low Na diet. Yesterday had low Na on labs yesterday and repeat was low today. No change in mentation. Has had post nasal drip and cough for past few days on Nasonex which is only new med. Not on any diuretics. No other med changes. No other complaints. Diet induced vs volume overload hyponatremia, Check labs, lytes, osm, CXR given cough. TBA.  PCP Sanjay Puri

## 2019-09-07 NOTE — ED PROVIDER NOTE - PHYSICAL EXAMINATION
[place]:  Gen: WNWD NAD  HEENT: NCAT EOMI normal pharynx dry mm [place]  Neck: supple  Back: Thoracic kyphosis   CV: RRR, no murmur  Lung: CTA w dec air entry at BL bases   Abd: +BS soft NTND  Ext: wwp, palp pulses, BL LE pitting edema to hip   Skin: ecchymosis to L hip w healing surgical incision sites   Neuro: A&Ox2-3, CN grossly intact, sensation intact, motor 5/5 throughout

## 2019-09-07 NOTE — ED PROVIDER NOTE - CLINICAL SUMMARY MEDICAL DECISION MAKING FREE TEXT BOX
89y M sent from [facility] for low sodium. Likely multifactorial. Possibly SIADH from uncontrolled pain, versus low salt intake versus new CHF. Pt appears mildly hypervolemic. will get basics and cardiac labs including probnp, also urine and serum osms and urine sodium.

## 2019-09-07 NOTE — ED ADULT NURSE REASSESSMENT NOTE - NS ED NURSE REASSESS COMMENT FT1
1915: Report taken from PETER Mohr. Pt received in bed A&O x 3. Denying chest pain, SOB, n/v/d. Placed on cardiac monitor - NSR. Pt's daughter at bedside.    2220: Waiting for results of second troponin and dispo.

## 2019-09-07 NOTE — ED PROVIDER NOTE - NS ED ROS FT
Gen: No fever, normal appetite  Eyes: No eye irritation or discharge  ENT: No ear pain, congestion, sore throat  Resp: No trouble breathing  Cardiovascular: No chest pain or palpitation  Gastroenteric: No nausea/vomiting, diarrhea  :  Dec urine output; no dysuria  MS: No joint or muscle pain  Skin: No rashes  Neuro: No headache; no abnormal movements  Remainder negative, except as per the HPI

## 2019-09-07 NOTE — ED ADULT NURSE NOTE - NSIMPLEMENTINTERV_GEN_ALL_ED
Implemented All Fall with Harm Risk Interventions:  [place] to call system. Call bell, personal items and telephone within reach. Instruct patient to call for assistance. Room bathroom lighting operational. Non-slip footwear when patient is off stretcher. Physically safe environment: no spills, clutter or unnecessary equipment. Stretcher in lowest position, wheels locked, appropriate side rails in place. Provide visual cue, wrist band, yellow gown, etc. Monitor gait and stability. Monitor for mental status changes and reorient to person, place, and time. Review medications for side effects contributing to fall risk. Reinforce activity limits and safety measures with patient and family. Provide visual clues: red socks.

## 2019-09-08 DIAGNOSIS — I10 ESSENTIAL (PRIMARY) HYPERTENSION: ICD-10-CM

## 2019-09-08 DIAGNOSIS — R94.31 ABNORMAL ELECTROCARDIOGRAM [ECG] [EKG]: ICD-10-CM

## 2019-09-08 DIAGNOSIS — E78.5 HYPERLIPIDEMIA, UNSPECIFIED: ICD-10-CM

## 2019-09-08 DIAGNOSIS — Z85.46 PERSONAL HISTORY OF MALIGNANT NEOPLASM OF PROSTATE: ICD-10-CM

## 2019-09-08 DIAGNOSIS — I50.9 HEART FAILURE, UNSPECIFIED: ICD-10-CM

## 2019-09-08 DIAGNOSIS — D64.9 ANEMIA, UNSPECIFIED: ICD-10-CM

## 2019-09-08 DIAGNOSIS — E87.1 HYPO-OSMOLALITY AND HYPONATREMIA: ICD-10-CM

## 2019-09-08 DIAGNOSIS — Z29.9 ENCOUNTER FOR PROPHYLACTIC MEASURES, UNSPECIFIED: ICD-10-CM

## 2019-09-08 LAB
ANION GAP SERPL CALC-SCNC: 11 MMOL/L — SIGNIFICANT CHANGE UP (ref 5–17)
ANION GAP SERPL CALC-SCNC: 12 MMOL/L — SIGNIFICANT CHANGE UP (ref 5–17)
ANION GAP SERPL CALC-SCNC: 8 MMOL/L — SIGNIFICANT CHANGE UP (ref 5–17)
BUN SERPL-MCNC: 14 MG/DL — SIGNIFICANT CHANGE UP (ref 7–23)
BUN SERPL-MCNC: 15 MG/DL — SIGNIFICANT CHANGE UP (ref 7–23)
BUN SERPL-MCNC: 15 MG/DL — SIGNIFICANT CHANGE UP (ref 7–23)
BUN SERPL-MCNC: 16 MG/DL — SIGNIFICANT CHANGE UP (ref 7–23)
BUN SERPL-MCNC: 17 MG/DL — SIGNIFICANT CHANGE UP (ref 7–23)
CALCIUM SERPL-MCNC: 7.1 MG/DL — LOW (ref 8.4–10.5)
CALCIUM SERPL-MCNC: 8.2 MG/DL — LOW (ref 8.4–10.5)
CALCIUM SERPL-MCNC: 8.2 MG/DL — LOW (ref 8.4–10.5)
CALCIUM SERPL-MCNC: 8.4 MG/DL — SIGNIFICANT CHANGE UP (ref 8.4–10.5)
CALCIUM SERPL-MCNC: 8.5 MG/DL — SIGNIFICANT CHANGE UP (ref 8.4–10.5)
CHLORIDE SERPL-SCNC: 74 MMOL/L — LOW (ref 96–108)
CHLORIDE SERPL-SCNC: 82 MMOL/L — LOW (ref 96–108)
CHLORIDE SERPL-SCNC: 85 MMOL/L — LOW (ref 96–108)
CHLORIDE SERPL-SCNC: 86 MMOL/L — LOW (ref 96–108)
CHLORIDE SERPL-SCNC: 90 MMOL/L — LOW (ref 96–108)
CO2 SERPL-SCNC: 19 MMOL/L — LOW (ref 22–31)
CO2 SERPL-SCNC: 21 MMOL/L — LOW (ref 22–31)
CO2 SERPL-SCNC: 22 MMOL/L — SIGNIFICANT CHANGE UP (ref 22–31)
CO2 SERPL-SCNC: 23 MMOL/L — SIGNIFICANT CHANGE UP (ref 22–31)
CO2 SERPL-SCNC: 23 MMOL/L — SIGNIFICANT CHANGE UP (ref 22–31)
CORTIS AM PEAK SERPL-MCNC: 17.3 UG/DL — SIGNIFICANT CHANGE UP (ref 6–18.4)
CREAT SERPL-MCNC: 0.62 MG/DL — SIGNIFICANT CHANGE UP (ref 0.5–1.3)
CREAT SERPL-MCNC: 0.64 MG/DL — SIGNIFICANT CHANGE UP (ref 0.5–1.3)
CREAT SERPL-MCNC: 0.68 MG/DL — SIGNIFICANT CHANGE UP (ref 0.5–1.3)
CREAT SERPL-MCNC: 0.71 MG/DL — SIGNIFICANT CHANGE UP (ref 0.5–1.3)
CREAT SERPL-MCNC: 0.79 MG/DL — SIGNIFICANT CHANGE UP (ref 0.5–1.3)
GLUCOSE BLDC GLUCOMTR-MCNC: 117 MG/DL — HIGH (ref 70–99)
GLUCOSE SERPL-MCNC: 104 MG/DL — HIGH (ref 70–99)
GLUCOSE SERPL-MCNC: 107 MG/DL — HIGH (ref 70–99)
GLUCOSE SERPL-MCNC: 107 MG/DL — HIGH (ref 70–99)
GLUCOSE SERPL-MCNC: 904 MG/DL — CRITICAL HIGH (ref 70–99)
GLUCOSE SERPL-MCNC: 95 MG/DL — SIGNIFICANT CHANGE UP (ref 70–99)
HCT VFR BLD CALC: 27.7 % — LOW (ref 39–50)
HGB BLD-MCNC: 9.4 G/DL — LOW (ref 13–17)
MAGNESIUM SERPL-MCNC: 2 MG/DL — SIGNIFICANT CHANGE UP (ref 1.6–2.6)
MCHC RBC-ENTMCNC: 29.3 PG — SIGNIFICANT CHANGE UP (ref 27–34)
MCHC RBC-ENTMCNC: 33.9 GM/DL — SIGNIFICANT CHANGE UP (ref 32–36)
MCV RBC AUTO: 86.3 FL — SIGNIFICANT CHANGE UP (ref 80–100)
OSMOLALITY 24H UR: 250 MOSM/KG — SIGNIFICANT CHANGE UP (ref 50–1200)
PHOSPHATE SERPL-MCNC: 2.8 MG/DL — SIGNIFICANT CHANGE UP (ref 2.5–4.5)
PLATELET # BLD AUTO: 464 K/UL — HIGH (ref 150–400)
POTASSIUM SERPL-MCNC: 3.7 MMOL/L — SIGNIFICANT CHANGE UP (ref 3.5–5.3)
POTASSIUM SERPL-MCNC: 4.3 MMOL/L — SIGNIFICANT CHANGE UP (ref 3.5–5.3)
POTASSIUM SERPL-MCNC: 4.5 MMOL/L — SIGNIFICANT CHANGE UP (ref 3.5–5.3)
POTASSIUM SERPL-MCNC: 4.7 MMOL/L — SIGNIFICANT CHANGE UP (ref 3.5–5.3)
POTASSIUM SERPL-MCNC: 4.7 MMOL/L — SIGNIFICANT CHANGE UP (ref 3.5–5.3)
POTASSIUM SERPL-SCNC: 3.7 MMOL/L — SIGNIFICANT CHANGE UP (ref 3.5–5.3)
POTASSIUM SERPL-SCNC: 4.3 MMOL/L — SIGNIFICANT CHANGE UP (ref 3.5–5.3)
POTASSIUM SERPL-SCNC: 4.5 MMOL/L — SIGNIFICANT CHANGE UP (ref 3.5–5.3)
POTASSIUM SERPL-SCNC: 4.7 MMOL/L — SIGNIFICANT CHANGE UP (ref 3.5–5.3)
POTASSIUM SERPL-SCNC: 4.7 MMOL/L — SIGNIFICANT CHANGE UP (ref 3.5–5.3)
RBC # BLD: 3.21 M/UL — LOW (ref 4.2–5.8)
RBC # FLD: 13.4 % — SIGNIFICANT CHANGE UP (ref 10.3–14.5)
SODIUM SERPL-SCNC: 101 MMOL/L — CRITICAL LOW (ref 135–145)
SODIUM SERPL-SCNC: 115 MMOL/L — CRITICAL LOW (ref 135–145)
SODIUM SERPL-SCNC: 120 MMOL/L — CRITICAL LOW (ref 135–145)
SODIUM SERPL-SCNC: 120 MMOL/L — CRITICAL LOW (ref 135–145)
SODIUM SERPL-SCNC: 124 MMOL/L — LOW (ref 135–145)
TSH SERPL-MCNC: 1.22 UIU/ML — SIGNIFICANT CHANGE UP (ref 0.27–4.2)
WBC # BLD: 9.74 K/UL — SIGNIFICANT CHANGE UP (ref 3.8–10.5)
WBC # FLD AUTO: 9.74 K/UL — SIGNIFICANT CHANGE UP (ref 3.8–10.5)

## 2019-09-08 PROCEDURE — 99233 SBSQ HOSP IP/OBS HIGH 50: CPT | Mod: GC

## 2019-09-08 PROCEDURE — 99232 SBSQ HOSP IP/OBS MODERATE 35: CPT | Mod: GC

## 2019-09-08 PROCEDURE — 93010 ELECTROCARDIOGRAM REPORT: CPT

## 2019-09-08 RX ORDER — AMLODIPINE BESYLATE AND BENAZEPRIL HYDROCHLORIDE 10; 20 MG/1; MG/1
1 CAPSULE ORAL
Qty: 0 | Refills: 0 | DISCHARGE

## 2019-09-08 RX ORDER — SODIUM CHLORIDE 9 MG/ML
1000 INJECTION, SOLUTION INTRAVENOUS
Refills: 0 | Status: DISCONTINUED | OUTPATIENT
Start: 2019-09-08 | End: 2019-09-08

## 2019-09-08 RX ORDER — TOLVAPTAN 15 MG/1
15 TABLET ORAL ONCE
Refills: 0 | Status: COMPLETED | OUTPATIENT
Start: 2019-09-08 | End: 2019-09-08

## 2019-09-08 RX ORDER — ENOXAPARIN SODIUM 100 MG/ML
40 INJECTION SUBCUTANEOUS DAILY
Refills: 0 | Status: DISCONTINUED | OUTPATIENT
Start: 2019-09-08 | End: 2019-09-12

## 2019-09-08 RX ADMIN — SODIUM CHLORIDE 50 MILLILITER(S): 9 INJECTION, SOLUTION INTRAVENOUS at 20:17

## 2019-09-08 RX ADMIN — TOLVAPTAN 15 MILLIGRAM(S): 15 TABLET ORAL at 05:27

## 2019-09-08 RX ADMIN — ENOXAPARIN SODIUM 40 MILLIGRAM(S): 100 INJECTION SUBCUTANEOUS at 11:49

## 2019-09-08 NOTE — H&P ADULT - NSHPOUTPATIENTPROVIDERS_GEN_ALL_CORE
PCP/Cardiology: Geovani Puri ([facility] [phone])  Geriatrics: Marian Romero ([phone])

## 2019-09-08 NOTE — H&P ADULT - HISTORY OF PRESENT ILLNESS
89 yo man hx prostate CA (s/p prostate resection, follows at [facility]), HTN, and HLD sent from [facility] for hyponatremia (114). Pt recently hospitalized 8/20-8/30 2/2 mechanical c/b intertrochanteric fracture s/p surgical fixation discharged to [facility].  1 day PTA pt found with Na 114. No change in mentation. Has had post nasal drip and cough for past few days on Nasone, ROS otherwise negative.  Pt was recently evaluated at [facility] for word finding difficulties determined to be 2/2 hyponatremia which resolved with diuresis.  Pt also has been in quite a bit of pain since the surgery that hasn't been that well controlled. No fevers chills, chest pain, SOB, n/v/d.     EDVS: afebrile, HR 80s, -130s/60s, RR 16, SpO2 97% RA  ED gave lasix 20 IV x 1  Labs notable for Na 116, Urine Osm 614, Urine Na 34, Serum Osm 143, BNP 57500, Cr at baseline 0.6

## 2019-09-08 NOTE — H&P ADULT - PROBLEM SELECTOR PLAN 1
Na 116 on admission. Chronicity unclear as labs were not checked regularly at rehab. Pt currently AOx4 and without mental status change per daughter at bedside. Pt appears hypervolemic on exam with 2+ pitting edema to hips b/l. May be mixed picture as urine studies also suggestive of ?SIADH.    - Correct Na <0.5 meq/Hr, <8 meq in first 24 hr   - S/p lasix 20 IV in ED  - C/w lasix 20 IV dialy as pt still appears overloaded   - Strict I/O, daily weights   - Fluid restrict to 1L daily   - Trend BMP q6h Na 116 on admission. Chronicity unclear as labs were not checked regularly at rehab. Pt currently AOx4 and without mental status change per daughter at bedside. Pt appears hypervolemic on exam with 2+ pitting edema to hips b/l. May be mixed picture as urine studies also suggestive of ?SIADH.    - Correct Na <0.5 meq/Hr, <8 meq in first 24 hr   - S/p lasix 20 IV in ED  - Strict I/O, daily weights   - Trend BMP q6h  - Tolvaptan ordered by nephrology, hold lasix and fluid restriction for now

## 2019-09-08 NOTE — H&P ADULT - ASSESSMENT
89 yo man hx prostate CA (s/p prostate resection), HTN, and HLD sent from [facility] for hyponatremia (114), clinically hypervolemic, found with elevated BNP and EKG findings c/w ischemia.

## 2019-09-08 NOTE — PROGRESS NOTE ADULT - PROBLEM SELECTOR PLAN 1
- Hyponatremia (116) Hypervolemic, hypotonic, likely chronic given previous workup 1 week PTA with similar hypoNa as outpatient.  - Urine studies suggestive of SIADH etiology, most likely 2/2 CHF given worsening exercise intolerance vs. renal dysfunction.   -s/p lasix 20 IV in ED  - Samsca 15mg x 1 (9/8)  - AM cortisol wnl  - Uric acid wnl  - TSH pending   - Strict IO, daily weights   - BMP Q6; correct Na .5/hr, <8/24hours given chronicity.   - Hold lasix   - No fluid restriction in setting of samsca use   - Workup for HF and renal dysfunction as below.. - Hyponatremia (116) Hypervolemic, hypotonic, likely sub-acute given previous workup 1 week PTA with similar hypoNa as outpatient.  - Urine studies suggestive of SIADH etiology, most likely 2/2 recent surgery, post procedure pain vs. CHF given worsening exercise intolerance vs. renal dysfunction.   -s/p lasix 20 IV in ED  - Samsca 15mg x 1 (9/8)  - AM cortisol wnl  - Uric acid wnl  - TSH pending   - Strict IO, daily weights   - BMP Q6; correct Na .5/hr, <8/24hours given chronicity.   - Hold lasix   - Workup for HF and renal dysfunction as below.

## 2019-09-08 NOTE — CONSULT NOTE ADULT - ATTENDING COMMENTS
Patient seen and examined. Agree with assessment and plan as outlined above. No significant prior cardiac history admitted for hyponatremia in setting of hip pain noted to have significant ECG changes. Trop not consistent with ACS. Patient without cardiac symptoms. Unclear cause of ECG changes. Can check echo. Possible related to stress CM. Also possibly related to neuro process. Continue present care. Patient is volume up on tolvaptan. Continue strict I and O. Discussed with family at bedside.

## 2019-09-08 NOTE — H&P ADULT - NSHPPHYSICALEXAM_GEN_ALL_CORE
ICU Vital Signs Last 24 Hrs  T(C): 36.7 (08 Sep 2019 00:30), Max: 36.7 (08 Sep 2019 00:30)  T(F): 98.1 (08 Sep 2019 00:30), Max: 98.1 (08 Sep 2019 00:30)  HR: 78 (08 Sep 2019 00:30) (78 - 95)  BP: 101/57 (08 Sep 2019 00:30) (101/57 - 135/61)  BP(mean): 77 (07 Sep 2019 21:43) (77 - 77)  RR: 18 (08 Sep 2019 00:30) (16 - 21)  SpO2: 96% (08 Sep 2019 00:30) (96% - 100%)    GENERAL: NAD  HEENT: EOMI, MMM, no oropharyngeal lesions or erythema appreciated  Pulm: normal work of breathing, CTABL  CV: RRR, S1&S2+, no m/r/g appreciated  ABDOMEN: soft, nt, nd, no hepatosplenomegaly  MSK: decreased ROM of left lower extremity, otherwise intact   EXTREMITIES:  2+ pitting edema of lower extremities b/l to hip  Neuro: A&Ox4, no focal deficits,   SKIN: warm and dry, no visible rash, bruise overlying left hip, surgical incision c/d/i ICU Vital Signs Last 24 Hrs  T(C): 36.7 (08 Sep 2019 00:30), Max: 36.7 (08 Sep 2019 00:30)  T(F): 98.1 (08 Sep 2019 00:30), Max: 98.1 (08 Sep 2019 00:30)  HR: 78 (08 Sep 2019 00:30) (78 - 95)  BP: 101/57 (08 Sep 2019 00:30) (101/57 - 135/61)  BP(mean): 77 (07 Sep 2019 21:43) (77 - 77)  RR: 18 (08 Sep 2019 00:30) (16 - 21)  SpO2: 96% (08 Sep 2019 00:30) (96% - 100%)    GENERAL: NAD, comfortable  HEENT: EOMI, MMM, no oropharyngeal lesions or erythema appreciated  Pulm: normal work of breathing, CTABL  CV: RRR, S1&S2+, no m/r/g appreciated  ABDOMEN: soft, nt, nd, no hepatosplenomegaly  MSK: decreased ROM of left lower extremity, otherwise intact   EXTREMITIES:  2+ pitting edema of lower extremities b/l to hip  Neuro: A&Ox4, no focal deficits,   SKIN: warm and dry, no visible rash, bruise overlying left hip, surgical incision c/d/i  : no suprapubic tenderness, non-traumatic

## 2019-09-08 NOTE — H&P ADULT - NSICDXFAMILYHX_GEN_ALL_CORE_FT
FAMILY HISTORY:  Sibling  Still living? Unknown  Family history of breast cancer in male, Age at diagnosis: Age Unknown  Family history of prostate cancer, Age at diagnosis: Age Unknown

## 2019-09-08 NOTE — PROGRESS NOTE ADULT - PROBLEM SELECTOR PLAN 4
- Possibly 2/2 renal etiology vs. poor PO intake ,  - iron panel, ferritin, LDH, haptoglobin, pending

## 2019-09-08 NOTE — PROGRESS NOTE ADULT - PROBLEM SELECTOR PLAN 2
- Findings suggestive of AS given age, new RUSB systolic murmur, worsening exercise intolerance, PND, possible syncopal episodes. Low suspicion of ischemic etiology given troponin wnl, no chest pain  - ECG: new TWI   - BNP: >18K  -TTE pending  - Cardiology following - Findings suggestive of AS given age, new RUSB systolic murmur, worsening exercise intolerance, PND, possible syncopal episodes. Low suspicion of ischemic etiology given troponin wnl, no chest pain  - ECG: new TWI - no plans for intervention at this time per cards  - BNP: >18K  -TTE pending  - Cardiology following

## 2019-09-08 NOTE — H&P ADULT - NSHPSOCIALHISTORY_GEN_ALL_CORE
non smoker  no drug/alcohol abuse non smoker  no drug/alcohol abuse  limited ability to perform ADLs

## 2019-09-08 NOTE — H&P ADULT - PROBLEM SELECTOR PLAN 5
DVT: lovenox 30  Diet: DASH with fluid restriction to 1L  Dispo: PT eval -Continue to trend Hg  -No acute inpatient intervetion

## 2019-09-08 NOTE — CONSULT NOTE ADULT - SUBJECTIVE AND OBJECTIVE BOX
Patient seen and evaluated at bedside    Chief Complaint: low Na, asymptomatic    HPI:  89 y/o man with h/o prostate ca s/p prostate resection, HTN/HLD who was sent in for hyponatremia to 114. Of note, pt was recently hospitalized 8/20-8/30 2/2 mechanical c/b intertrochanteric fracture s/p surgical fixation discharged to [facility]. 1 day PTA pt found with Na 114. No change in mentation. Denies CP/SOB, N/V/D, abdominal pain, f/c, cough.    In the ED, pt was  afebrile, HR 80s, -130s/60s, RR 16, SpO2 97% RA, ED gave lasix 20 IV x 1  Labs notable for Na 116, Urine Osm 614, Urine Na 34, Serum Osm 143, BNP 63800, Cr at baseline 0.6. Troponin was 35 and 36. ECG done showing deep T wave inversions in V2-v6, and i, II, and avL. This was repeated several hours later which is unchanged.      PMHx:   Osteoporosis  HLD (hyperlipidemia)  Hypertension  History of Prostate Cancer      PSHx:   H/O arthroplasty  S/P Prostatectomy  S/P Appendectomy      Allergies:  No Known Allergies    Home Meds:  amlodipine-benazepril  lipitor  asa  vitamins  iron   melatonin    Current Medications:   enoxaparin Injectable 40 milliGRAM(s) SubCutaneous daily      FAMILY HISTORY:  Family history of prostate cancer (Sibling)  Family history of breast cancer in male (Sibling)      Social History:  Smoking History:  Alcohol Use:  Drug Use:    REVIEW OF SYSTEMS:  CONSTITUTIONAL: No weakness, fevers or chills  EYES/ENT: No visual changes;  No dysphagia  NECK: No pain or stiffness  RESPIRATORY: No cough, wheezing, hemoptysis; No shortness of breath  CARDIOVASCULAR: No chest pain or palpitations; No lower extremity edema  GASTROINTESTINAL: No abdominal or epigastric pain. No nausea, vomiting, or hematemesis; No diarrhea or constipation. No melena or hematochezia.  BACK: No back pain  GENITOURINARY: No dysuria, frequency or hematuria  NEUROLOGICAL: No numbness or weakness  SKIN: No itching, burning, rashes, or lesions   All other review of systems is negative unless indicated above.    Physical Exam:  T(F): 98.1 (09-08), Max: 98.1 (09-08)  HR: 78 (09-08) (78 - 95)  BP: 101/57 (09-08) (101/57 - 135/61)  RR: 18 (09-08)  SpO2: 96% (09-08)  Well-appearing hard of hearing man in NAD  RRR nl s1 s2, 2/6 systolic murmur heard best at the LUSB?  CTAB  Abd Soft NTND  ext WWP, 2+ pitting edema up to b/l hips    Labs:  reviewed                        9.9    13.7  )-----------( 513      ( 07 Sep 2019 18:01 )             30.4     09-07    116<LL>  |  81<L>  |  19  ----------------------------<  111<H>  5.0   |  19<L>  |  0.60    Ca    8.3<L>      07 Sep 2019 18:01  Mg     1.9     09-07    TPro  6.0  /  Alb  3.4  /  TBili  0.9  /  DBili  x   /  AST  23  /  ALT  23  /  AlkPhos  107  09-07    PT/INR - ( 07 Sep 2019 18:01 )   PT: 12.2 sec;   INR: 1.07 ratio         PTT - ( 07 Sep 2019 18:01 )  PTT:28.2 sec    CARDIAC MARKERS ( 07 Sep 2019 21:58 )  36 ng/L / x     / x     / x     / x     / x      CARDIAC MARKERS ( 07 Sep 2019 18:01 )  35 ng/L / x     / x     / 64 U/L / x     / 4.8 ng/mL    Serum Pro-Brain Natriuretic Peptide: 61176 pg/mL (09-07 @ 18:01)    Cardiovascular Diagnostic Testing:    ECG: reviewed    Echo: reportedly normal last year    Imaging:    CXR:  clear lungs

## 2019-09-08 NOTE — CONSULT NOTE ADULT - ASSESSMENT
Assessment and Recommendations:  89 y/o man with h/o prostate ca s/p prostate resection, HTN/HLD, recent hip surgery 8/2018 who presents with 1 month of worsening b/l LE edema, found to be hyponatremic to 114. No neurologic changes noted currently. Incidentally found to have deep TWI V2-V6 as well as I, avL, and II. Currently asymptomatic. Low suspicion for ACS given stable ECG changes and troponins without chest pain.  -these changes could be related to HCM, neurologic injury, and possibly takotsubo's  -collateral from Dr. Nilo Lewis (outpt cardiologist)  -check TTE in the AM   -given pt is receiving tolvaptan for hypona per renal, hold off on lasix for now    Ty Tucker MD  Cardiology Fellow    All Cardiology service information can be found 24/7 on [email or web address], password: [email or web address]

## 2019-09-08 NOTE — H&P ADULT - NSICDXPASTMEDICALHX_GEN_ALL_CORE_FT
PAST MEDICAL HISTORY:  History of Prostate Cancer     HLD (hyperlipidemia)     Hypertension     Osteoporosis

## 2019-09-08 NOTE — H&P ADULT - PROBLEM SELECTOR PLAN 2
EKG with new TWI from prior in EMR in 2018  BNP >29317, Trops 35 > 36  Repeat EKG  TTE given concern of new onset HF

## 2019-09-08 NOTE — H&P ADULT - NSHPREVIEWOFSYSTEMS_GEN_ALL_CORE
REVIEW OF SYSTEMS:    CONSTITUTIONAL: No weakness, fevers or chills  EYES/ENT: No visual changes;  no throat pain   NECK: No pain or stiffness  RESPIRATORY: +cough. No wheezing, hemoptysis; No shortness of breath  CARDIOVASCULAR: No chest pain or palpitations  GASTROINTESTINAL: No abdominal or epigastric pain. No nausea, vomiting, or hematemesis; No diarrhea or constipation. No melena or hematochezia.  GENITOURINARY: No dysuria, change in frequency or hematuria  NEUROLOGICAL: No numbness or weakness  SKIN: No itching, burning, rashes, or lesions   All other review of systems is negative unless indicated above. REVIEW OF SYSTEMS:    CONSTITUTIONAL: No weakness, fevers or chills  EYES/ENT: No visual changes;  no throat pain   NECK: No pain or stiffness  RESPIRATORY: +cough. No wheezing, hemoptysis; No shortness of breath  CARDIOVASCULAR: No chest pain or palpitations  GASTROINTESTINAL: No abdominal or epigastric pain. No nausea, vomiting, or hematemesis; No diarrhea or constipation. No melena or hematochezia.  GENITOURINARY: No dysuria, change in frequency or hematuria  NEUROLOGICAL: No numbness or weakness  SKIN: No itching, burning, rashes, or lesions   Psych: no depression, changes in sleep, changes in concentration, or lack of energy  Heme/Onc: no purpura, petechiae or night sweats

## 2019-09-08 NOTE — PROVIDER CONTACT NOTE (CRITICAL VALUE NOTIFICATION) - SITUATION
Na = 101  Glucose = 904
Lab called with critical sodium: Sodium serum resulted twice from STAT BMP/ regular BMP as 120

## 2019-09-08 NOTE — H&P ADULT - NSHPLABSRESULTS_GEN_ALL_CORE
9.9    13.7  )-----------( 513      ( 07 Sep 2019 18:01 )             30.4       09-07    116<LL>  |  81<L>  |  19  ----------------------------<  111<H>  5.0   |  19<L>  |  0.60    Ca    8.3<L>      07 Sep 2019 18:01  Mg     1.9     09-07    TPro  6.0  /  Alb  3.4  /  TBili  0.9  /  DBili  x   /  AST  23  /  ALT  23  /  AlkPhos  107  09-07                  PT/INR - ( 07 Sep 2019 18:01 )   PT: 12.2 sec;   INR: 1.07 ratio         PTT - ( 07 Sep 2019 18:01 )  PTT:28.2 sec    Lactate Trend      CARDIAC MARKERS ( 07 Sep 2019 18:01 )  x     / x     / 64 U/L / x     / 4.8 ng/mL        CAPILLARY BLOOD GLUCOSE    Radiology:   CXR 9/7/2019: Clear lungs    EKG:  NSR with first degree AV block, deeply-inverted T waves in V2-6 reminiscent of Wellen's morphology, prolonged QTc at 486

## 2019-09-08 NOTE — H&P ADULT - ATTENDING COMMENTS
Patient seen and examined at bedside with resident. Below are my findings and assessment:    89M with PMHx of prostate cancer (post-prostatectomy and currently on Lupron) and HTN presents from [facility] for hyponatremia found on routine blood work. Patient has a recent history of trochanteric fracture s/p surgical fixation approximately several weeks prior and then after surgery was sent to [facility]. While there he didn't really have any complaints aside from bilateral lower extremity edema. Patient denies shortness of breath, chest pain, lightheadedness, or dizziness. No prior history of heart disease (CAD or CHF). He does have prior history of hyponatremia which required hospitalization at [facility] approximately one year prior when he presented with slurred speech. Per his daughter at bedside, there were never given an etiology for his hyponatremia. When seen on [facility]OWER by me, patient had no acute complaints. Appears to be at baseline mentation-wise per daughter. Patient extremely hard of hearing, likely secondary to presbycusis.    Vitals: T: 98.1, BP: 101/57, HR: 78, RR: 18, O2Sat: 96%    Labs/Imaging:  Na: 116 (prior 130s several weeks ago), K: 5.0, Cl: 81  BUN/Cr: 19/0.60, Glucose: 111  H/H: 9.9/30.4  Trop: 30s x2  Serum Osm: 143, Urine Osm: 614  Urine Sodium: 34  pro-BNP: 18K    Assessment/Plan:  89M with PMHx of prostate cancer (post-prostatectomy and currently on Lupron) and HTN presents from [facility] for hyponatremia found on routine blood work.  He has a prior history of symptomatic hyponatremia, with unknown etiology. Patient at this point asymptomatic. Looking at his labs, he has hypo-osmolar hyponatremia. On exam he is either euvolemic vs. hypervolemic which is subjective. At this point he does not require hypertonic solution acutely. He does have recent surgery so SIADH is a possibility. Patient relatively hyperkalemic and hypotensive, though not hypoglycemic; thus, adrenal insufficiency should also be considered. Urine sodium although normal, higher than what I would expect given his hyponatremia as I would expect his kidneys to be conserving sodium. New onset CHF is possible, although clinically does not seem to grossly have right-sided HF. Plan as documented by resident, but would perform thyroid screening, rule out adrenal insufficiency, consider HF work up, trend sodium with serial BMPs to avoid overcorrection. If SIADH is likely, patient may benefit from salt tablets.    Rest of plan as documented by resident. Patient seen and examined at bedside with resident. Below are my findings and assessment:    89M with PMHx of prostate cancer (post-prostatectomy and currently on Lupron) and HTN presents from [facility] for hyponatremia found on routine blood work. Patient has a recent history of trochanteric fracture s/p surgical fixation approximately several weeks prior and then after surgery was sent to [facility]. While there he didn't really have any complaints aside from bilateral lower extremity edema. Patient denies shortness of breath, chest pain, lightheadedness, or dizziness. No prior history of heart disease (CAD or CHF). He does have prior history of hyponatremia which required hospitalization at [facility] approximately one year prior when he presented with slurred speech. Per his daughter at bedside, there were never given an etiology for his hyponatremia. When seen on 6TOWER by me, patient had no acute complaints. Appears to be at baseline mentation-wise per daughter. Patient extremely hard of hearing, likely secondary to presbycusis.    Vitals: T: 98.1, BP: 101/57, HR: 78, RR: 18, O2Sat: 96%    Labs/Imaging:  Na: 116 (prior 130s several weeks ago), K: 5.0, Cl: 81  BUN/Cr: 19/0.60, Glucose: 111  H/H: 9.9/30.4  Trop: 30s x2  Serum Osm: 143, Urine Osm: 614  Urine Sodium: 34  pro-BNP: 18K    CXR personally reviewed by me: does not appear to have a consolidation    Assessment/Plan:  89M with PMHx of prostate cancer (post-prostatectomy and currently on Lupron) and HTN presents from [facility] for hyponatremia found on routine blood work.  He has a prior history of symptomatic hyponatremia, with unknown etiology. Patient at this point asymptomatic. Looking at his labs, he has hypo-osmolar hyponatremia. On exam he is either euvolemic vs. hypervolemic which is subjective. At this point he does not require hypertonic solution acutely. He does have recent surgery so SIADH is a possibility. Patient relatively hyperkalemic and hypotensive, though not hypoglycemic; thus, adrenal insufficiency should also be considered. Urine sodium although normal, higher than what I would expect given his hyponatremia as I would expect his kidneys to be conserving sodium. New onset CHF is possible, although clinically does not seem to grossly have right-sided HF. Plan as documented by resident, but would perform thyroid screening, rule out adrenal insufficiency, consider HF work up, trend sodium with serial BMPs to avoid overcorrection. If SIADH is likely, patient may benefit from salt tablets.    Rest of plan as documented by resident. Patient seen and examined at bedside with resident. Below are my findings and assessment:    89M with PMHx of prostate cancer (post-prostatectomy and currently on Lupron) and HTN presents from [facility] for hyponatremia found on routine blood work. Patient has a recent history of trochanteric fracture s/p surgical fixation approximately several weeks prior and then after surgery was sent to [facility]. While there he didn't really have any complaints aside from bilateral lower extremity edema. Patient denies shortness of breath, chest pain, lightheadedness, or dizziness. No prior history of heart disease (CAD or CHF). He does have prior history of hyponatremia which required hospitalization at [facility] approximately one year prior when he presented with slurred speech. Per his daughter at bedside, there were never given an etiology for his hyponatremia. When seen on [facility]OWER by me, patient had no acute complaints. Appears to be at baseline mentation-wise per daughter. Patient extremely hard of hearing, likely secondary to presbycusis.    Vitals: T: 98.1, BP: 101/57, HR: 78, RR: 18, O2Sat: 96%    Labs/Imaging:  Na: 116 (prior 130s several weeks ago), K: 5.0, Cl: 81  BUN/Cr: 19/0.60, Glucose: 111  H/H: 9.9/30.4  Trop: 30s x2  Serum Osm: 143, Urine Osm: 614  Urine Sodium: 34  pro-BNP: 18K    CXR personally reviewed by me: does not appear to have a consolidation    Assessment/Plan:  89M with PMHx of prostate cancer (post-prostatectomy and currently on Lupron) and HTN presents from [facility] for hyponatremia found on routine blood work.  He has a prior history of symptomatic hyponatremia, with unknown etiology. Patient at this point asymptomatic. Looking at his labs, he has hypo-osmolar hyponatremia. On exam he is either euvolemic vs. hypervolemic which is subjective. At this point he does not require hypertonic solution acutely. He does have recent surgery so SIADH is a possibility. Patient relatively hyperkalemic and hypotensive, though not hypoglycemic; thus, adrenal insufficiency should also be considered. Urine sodium although normal, higher than what I would expect given his hyponatremia as I would expect his kidneys to be conserving sodium. New onset CHF is possible, although clinically does not seem to grossly have right-sided HF. Plan as documented by resident, but would perform thyroid screening, rule out adrenal insufficiency, consider HF work up, trend sodium with serial BMPs to avoid overcorrection. If SIADH is likely, patient may benefit from salt tablets. Will restrict fluid intake for now.    Rest of plan as documented by resident. Patient seen and examined at bedside with resident. Below are my findings and assessment:    89M with PMHx of prostate cancer (post-prostatectomy and currently on Lupron) and HTN presents from [facility] for hyponatremia found on routine blood work. Patient has a recent history of trochanteric fracture s/p surgical fixation approximately several weeks prior and then after surgery was sent to [facility]. While there he didn't really have any complaints aside from bilateral lower extremity edema. Patient denies shortness of breath, chest pain, lightheadedness, or dizziness. No prior history of heart disease (CAD or CHF). He does have prior history of hyponatremia which required hospitalization at [facility] approximately one year prior when he presented with slurred speech. Per his daughter at bedside, there were never given an etiology for his hyponatremia. When seen on [facility] by me, patient had no acute complaints. Appears to be at baseline mentation-wise per daughter. Patient extremely hard of hearing, likely secondary to presbycusis.    Vitals: T: 98.1, BP: 101/57, HR: 78, RR: 18, O2Sat: 96%    Labs/Imaging:  Na: 116 (prior 130s several weeks ago), K: 5.0, Cl: 81  BUN/Cr: 19/0.60, Glucose: 111  H/H: 9.9/30.4  Trop: 30s x2  Serum Osm: 143, Urine Osm: 614  Urine Sodium: 34  pro-BNP: 18K    CXR personally reviewed by me: does not appear to have a consolidation    Assessment/Plan:  89M with PMHx of prostate cancer (post-prostatectomy and currently on Lupron) and HTN presents from [facility] for hyponatremia found on routine blood work.  He has a prior history of symptomatic hyponatremia, with unknown etiology. Patient at this point asymptomatic. Looking at his labs, he has hypo-osmolar hyponatremia. On exam he is either euvolemic vs. hypervolemic which is subjective. At this point he does not require hypertonic solution acutely. He does have recent surgery so SIADH is a possibility. Patient relatively hyperkalemic and hypotensive, though not hypoglycemic; thus, adrenal insufficiency should also be considered. Urine sodium although normal, higher than what I would expect given his hyponatremia as I would expect his kidneys to be conserving sodium. New onset CHF is possible, although clinically does not seem to grossly have right-sided HF. Plan as documented by resident, but would perform thyroid screening, rule out adrenal insufficiency, consider HF work up, trend sodium with serial BMPs to avoid overcorrection. If SIADH is likely, patient may benefit from salt tablets. Will restrict fluid intake for now.    Rest of plan as documented by resident.    Addendum (9/8/19 3:11 AM): Nephrology consult recommendations appreciated. Recommended a trial of Tolvaptan and no fluid restriction or diuretics while receiving the vasopressin receptor antagonist. Defer for now after following up morning sodium level. If sodium not improving will provide one dose. Patient seen and examined at bedside with resident. Below are my findings and assessment:    89M with PMHx of prostate cancer (post-prostatectomy and currently on Lupron) and HTN presents from [facility] for hyponatremia found on routine blood work. Patient has a recent history of trochanteric fracture s/p surgical fixation approximately several weeks prior and then after surgery was sent to [facility]. While there he didn't really have any complaints aside from bilateral lower extremity edema. Patient denies shortness of breath, chest pain, lightheadedness, or dizziness. No prior history of heart disease (CAD or CHF). He does have prior history of hyponatremia which required hospitalization at [facility] approximately one year prior when he presented with slurred speech. Per his daughter at bedside, there were never given an etiology for his hyponatremia. When seen on 6TOWER by me, patient had no acute complaints. Appears to be at baseline mentation-wise per daughter. Patient extremely hard of hearing, likely secondary to presbycusis.    Vitals: T: 98.1, BP: 101/57, HR: 78, RR: 18, O2Sat: 96%    Labs/Imaging:  Na: 116 (prior 130s several weeks ago), K: 5.0, Cl: 81  BUN/Cr: 19/0.60, Glucose: 111  H/H: 9.9/30.4  Trop: 30s x2  Serum Osm: 143, Urine Osm: 614  Urine Sodium: 34  pro-BNP: 18K    CXR personally reviewed by me: does not appear to have a consolidation    Assessment/Plan:  89M with PMHx of prostate cancer (post-prostatectomy and currently on Lupron) and HTN presents from [facility] for hyponatremia found on routine blood work.  He has a prior history of symptomatic hyponatremia, with unknown etiology. Patient at this point asymptomatic. Looking at his labs, he has hypo-osmolar hyponatremia. On exam he is either euvolemic vs. hypervolemic which is subjective. At this point he does not require hypertonic solution acutely. He does have recent surgery so SIADH is a possibility. Patient relatively hyperkalemic and hypotensive, though not hypoglycemic; thus, adrenal insufficiency should also be considered. Urine sodium although normal, higher than what I would expect given his hyponatremia as I would expect his kidneys to be conserving sodium. New onset CHF is possible, although clinically does not seem to grossly have right-sided HF. Plan as documented by resident, but would perform thyroid screening, rule out adrenal insufficiency, consider HF work up, trend sodium with serial BMPs to avoid overcorrection. If SIADH is likely, patient may benefit from salt tablets. Will restrict fluid intake for now.    Rest of plan as documented by resident.    Addendum (9/8/19 3:11 AM): Nephrology consult recommendations appreciated. Recommended a trial of Tolvaptan and no fluid restriction or diuretics while receiving the vasopressin receptor antagonist. Would be effective if SIADH. Appreciate their help. Patient seen and examined at bedside with resident. Below are my findings and assessment:    89M with PMHx of prostate cancer (post-prostatectomy and currently on Lupron) and HTN presents from [facility] for hyponatremia found on routine blood work. Patient has a recent history of trochanteric fracture s/p surgical fixation approximately several weeks prior and then after surgery was sent to [facility]. While there he didn't really have any complaints aside from bilateral lower extremity edema. Patient denies shortness of breath, chest pain, lightheadedness, or dizziness. No prior history of heart disease (CAD or CHF). He does have prior history of hyponatremia which required hospitalization at [place] approximately one year prior when he presented with slurred speech. Per his daughter at bedside, there were never given an etiology for his hyponatremia. When seen on 6TOWER by me, patient had no acute complaints. Appears to be at baseline mentation-wise per daughter. Patient extremely hard of hearing, likely secondary to presbycusis.    Vitals: T: 98.1, BP: 101/57, HR: 78, RR: 18, O2Sat: 96%    Labs/Imaging:  Na: 116 (prior 130s several weeks ago), K: 5.0, Cl: 81  BUN/Cr: 19/0.60, Glucose: 111  H/H: 9.9/30.4  Trop: 30s x2  Serum Osm: 143, Urine Osm: 614  Urine Sodium: 34  pro-BNP: 18K    CXR personally reviewed by me: does not appear to have a consolidation    Assessment/Plan:  89M with PMHx of prostate cancer (post-prostatectomy and currently on Lupron) and HTN presents from [facility] for hyponatremia found on routine blood work.  He has a prior history of symptomatic hyponatremia, with unknown etiology. Patient at this point asymptomatic. Looking at his labs, he has hypo-osmolar hyponatremia. On exam he is either euvolemic vs. hypervolemic which is subjective. At this point he does not require hypertonic solution acutely. He does have recent surgery so SIADH is a possibility. Patient relatively hyperkalemic and hypotensive, though not hypoglycemic; thus, adrenal insufficiency should also be considered. Urine sodium although normal, higher than what I would expect given his hyponatremia as I would expect his kidneys to be conserving sodium. New onset CHF is possible, although clinically does not seem to grossly have right-sided HF. Plan as documented by resident, but would perform thyroid screening, rule out adrenal insufficiency, consider HF work up, trend sodium with serial BMPs to avoid overcorrection. If SIADH is likely, patient may benefit from salt tablets.  Rest of plan as documented by resident.    Addendum (9/8/19 3:11 AM): Nephrology consult recommendations appreciated. Recommended a trial of Tolvaptan and no fluid restriction or diuretics while receiving the vasopressin receptor antagonist. Would be effective if SIADH. Appreciate their help.

## 2019-09-08 NOTE — PROVIDER CONTACT NOTE (CRITICAL VALUE NOTIFICATION) - ACTION/TREATMENT ORDERED:
MD Mechery and Hellerman made aware.
Dextrose 5% IV held at this time. Will re-draw BMP now and f/u with results. Will continue to monitor.
MD aware, BMP ordered Q6H. Will continue to monitor pt.

## 2019-09-08 NOTE — CONSULT NOTE ADULT - ASSESSMENT
ASSESSMENT:   Mr. Dennison is an 89 yo man hx prostate CA (s/p prostate resection), HTN, and HLD sent from [facility] for hyponatremia (114), clinically hypervolemic, found with elevated BNP and EKG findings c/w ischemia. Nephrology consulted for Hyponatremia.     1. Hypervolemic hyponatremia. He received lasix 20 mg IV in the ED   2. Anion gap metabolic acidosis.     RECOMMEND:   Correct Na to increase no more than 8 meq in first 24 hr   - S/p lasix 20 IV in ED  - Samsca 15 mg x 1 dose now. Patient is  still appears overloaded. DO NOT GIVE LASIX FOR NOW WHILE ON SAMSCA.   - Strict I/O, daily weights   - For first 24 hours after Samsca do not fluid restrict.   - Trend BMP q6h. Please call nephrology with the sodium results.   - cmp now.  - Ensure TID as patient from urine studies looks like patient is not getting enough osmoles.     Thank you for involving [employer] in this patient's care.    With warm regards,    Alejandra Rodriguez, DO  [employer]  [phone] ASSESSMENT:   Mr. Dennison is an 89 yo man hx prostate CA (s/p prostate resection), HTN, and HLD sent from [facility] for hyponatremia (114), clinically hypervolemic, found with elevated BNP and EKG findings c/w ischemia. Nephrology consulted for Hyponatremia.     1. Hypervolemic hyponatremia. He received lasix 20 mg IV in the ED   2. Anion gap metabolic acidosis.   3. Anemia.     RECOMMEND:   Correct Na to increase no more than 8 meq in first 24 hr   - S/p lasix 20 IV in ED  - Samsca 15 mg x 1 dose now. Patient is  still appears overloaded. DO NOT GIVE LASIX FOR NOW WHILE ON SAMSCA.   - Strict I/O, daily weights   - For first 24 hours after Samsca do not fluid restrict.   - Trend BMP q6h. Please call nephrology with the sodium results.   - cmp now.  - Ensure TID as patient from urine studies looks like patient is not getting enough osmoles.   - Recommend iron panel and ferritin.  - Recommend LDH and haptoglobin.   - Uric acid in the am along with TSH    Thank you for involving [employer] in this patient's care.    With warm regards,    Alejandra Rodriguez, DO  [employer]  [phone]

## 2019-09-08 NOTE — PROGRESS NOTE ADULT - ASSESSMENT
89M with PMHx of recent L hip surgery fixation, multiple falls, prostate cancer (s/p prostatectomy, on Lupron) and HTN who presents from rehab for management of asymptomatic hypervolemic hypotonic hyponatremia which is likely chronic in nature.     Etiology is most concerning for CHF (possibly 2/2 AS), given his age, RUSB murmur, worsening exercise intolerance (2 blocks), and recent history  multiple falls. 89M with PMHx of recent L hip surgery fixation, multiple falls, prostate cancer (s/p prostatectomy, on Lupron) and HTN who presents from rehab for management of asymptomatic hypervolemic hypotonic hyponatremia which is likely chronic in nature.     Etiology is most concerning for SIADH 2/2 recent stress of surgery vs. CHF (possibly 2/2 AS), given his age, RUSB murmur, worsening exercise intolerance (2 blocks), and recent history  multiple falls.

## 2019-09-08 NOTE — PROVIDER CONTACT NOTE (CRITICAL VALUE NOTIFICATION) - ASSESSMENT
Sodium leveles = 115
Patient A&O x2, baseline mental status. VSS (see flowsheet). FS = 117. Patient comfortable at this time, denies pain/discomfort.
Pt is A&Ox2-3 pt denies pain, discomfort, dizziness, SOB.

## 2019-09-08 NOTE — CONSULT NOTE ADULT - SUBJECTIVE AND OBJECTIVE BOX
Patient is a 89y old  Male who presents with a chief complaint of Hyponatremia (08 Sep 2019 01:10)      HPI:  89M with PMHx of prostate cancer (post-prostatectomy and currently on Lupron) and HTN presents from [facility] for hyponatremia found on routine blood work of 114. Patient has a recent history of trochanteric fracture s/p surgical fixation approximately several weeks prior and then after surgery was sent to [facility]. While there he didn't really have any complaints aside from bilateral lower extremity edema. Patient denies shortness of breath, chest pain, lightheadedness, or dizziness. No prior history of heart disease (CAD or CHF). He does have prior history of hyponatremia which required hospitalization at [facility] approximately one year prior when he presented with slurred speech. Per his daughter at bedside, there were never given an etiology for his hyponatremia. When seen on [facility] by me, patient had no acute complaints. Appears to be at baseline mentation-wise per daughter. Patient extremely hard of hearing, likely secondary to presbycusis.  Nephrology consulted for hyponatremia.     EDVS: afebrile, HR 80s, -130s/60s, RR 16, SpO2 97% RA  ED gave lasix 20 IV x 1  Labs notable for Na 116, Urine Osm 614, Urine Na 34, Serum Osm 143, BNP 74642, Cr at baseline 0.6 (08 Sep 2019 01:10)      PAST MEDICAL & SURGICAL HISTORY:  Osteoporosis  HLD (hyperlipidemia)  Hypertension  History of Prostate Cancer  H/O arthroplasty: Left shoulder.  S/P Prostatectomy  S/P Appendectomy      MEDICATIONS  (STANDING):  enoxaparin Injectable 40 milliGRAM(s) SubCutaneous daily  Home Medications:   * Patient Currently Takes Medications as of 08-Sep-2019 01:27 documented in Structured Notes  · 	melatonin 3 mg oral tablet: 1 tab(s) orally once a day (at bedtime), As needed, Insomnia  · 	aspirin 81 mg oral delayed release tablet: 1 tab(s) orally once a day  · 	acetaminophen 325 mg oral tablet: 3 tab(s) orally every 8 hours X 1 week, then PRN mild pain  · 	atorvastatin 20 mg oral tablet: 1 tab(s) orally once a day (at bedtime)  · 	Lupron:  intramuscular every 3 months  · 	amlodipine-benazepril 5 mg-10 mg oral capsule: 1 cap(s) orally once a day        Allergies  No Known Allergies    SOCIAL HISTORY:  Denies alcohol or tobacco abuse.    FAMILY HISTORY:  Family history of prostate cancer (Sibling)  Family history of breast cancer in male (Sibling)  No kidney disease in family.    REVIEW OF SYSTEMS:  CONSTITUTIONAL: Has weakness since he broke his left hip , denies fevers or chills  EYES/ENT: No visual changes. Hard of hearing.   NECK: No pain or stiffness  RESPIRATORY: Has cough, no wheezing, hemoptysis. No shortness of breath  CARDIOVASCULAR: No chest pain or palpitations  GASTROINTESTINAL: No abdominal or epigastric pain. No nausea, vomiting, or hematemesis. No diarrhea or constipation. No melena or hematochezia  GENITOURINARY: No dysuria, frequency or hematuria. No stones or infection  NEUROLOGICAL: No numbness or weakness  SKIN: No itching, burning, rashes, or lesions   All other review of systems is negative unless indicated above    VITAL:  T(C): , Max: 36.7 (09-08-19 @ 00:30)  T(F): , Max: 98.1 (09-08-19 @ 00:30)  HR: 78 (09-08-19 @ 00:30)  BP: 101/57 (09-08-19 @ 00:30)  BP(mean): 77 (09-07-19 @ 21:43)  RR: 18 (09-08-19 @ 00:30)  SpO2: 96% (09-08-19 @ 00:30)  Wt(kg): --    PHYSICAL EXAM:  General: Hard of hearing. NAD, Alert  HEENT: NCAT, PERRLA  Neck: Supple, No JVD  Respiratory: CTA-b/l  Cardiovascular: RRR s1s2, no m/r/g  Gastrointestinal: +BS, soft, NT/ND  Extremities: Has 3 +edema b/l  Neurological: no focal deficits; strength grossly intact  Psychiatric: Normal mood, normal affect  Skin: No rashes, no nevi      LABS:                        9.9    13.7  )-----------( 513      ( 07 Sep 2019 18:01 )             30.4     Na(116)/K(5.0)/Cl(81)/HCO3(19)/BUN(19)/Cr(0.60)Glu(111)/Ca(8.3)/Mg(1.9)/PO4(--)    09-07 @ 18:01  Na(114)/K(4.9)/Cl(80)/HCO3(23)/BUN(17)/Cr(0.62)Glu(106)/Ca(8.3)/Mg(--)/PO4(--)    09-07 @ 07:56  Na(116)/K(--)/Cl(--)/HCO3(--)/BUN(--)/Cr(--)Glu(--)/Ca(--)/Mg(--)/PO4(--)    09-06 @ 16:37  Na(113)/K(4.9)/Cl(81)/HCO3(23)/BUN(17)/Cr(0.67)Glu(97)/Ca(8.4)/Mg(--)/PO4(--)    09-06 @ 09:31    09-07    116<LL>  |  81<L>  |  19  ----------------------------<  111<H>  5.0   |  19<L>  |  0.60    Ca    8.3<L>      07 Sep 2019 18:01  Mg     1.9     09-07    TPro  6.0  /  Alb  3.4  /  TBili  0.9  /  DBili  x   /  AST  23  /  ALT  23  /  AlkPhos  107  09-07      Osmolality, Random Urine: 614 mos/kg (09-07 @ 20:44)  Sodium, Random Urine: 34 mmol/L (09-07 @ 20:44)    IMAGING:  INTERPRETATION:  CLINICAL INFORMATION: Cough.    EXAM: Radiograph of the chest.    COMPARISON: Chest radiograph from 8/20/2019.    FINDINGS:  The lungs are clear.  There is no evidence of pleural effusion or pneumothorax.  The cardiomediastinal silhouette cannot be accurately assessed in this   projection.  The visualized osseous and soft tissue structures demonstrate no acute   pathology.    IMPRESSION:   Clear lungs. No change when compared to previous study done August 20, 2019. Patient is a 89y old  Male who presents with a chief complaint of Hyponatremia (08 Sep 2019 01:10)      HPI:  89M with PMHx of prostate cancer (post-prostatectomy and currently on Lupron) and HTN presents from [facility] for hyponatremia found on routine blood work of 114. Patient has a recent history of trochanteric fracture s/p surgical fixation approximately several weeks prior and then after surgery was sent to [facility]. While there he didn't really have any complaints aside from bilateral lower extremity edema. Patient denies shortness of breath, chest pain, lightheadedness, or dizziness. No prior history of heart disease (CAD or CHF). He does have prior history of hyponatremia which required hospitalization at [facility] approximately one year prior when he presented with slurred speech. Per his daughter at bedside, there were never given an etiology for his hyponatremia. When seen on [facility] by me, patient had no acute complaints. Appears to be at baseline mentation-wise per daughter. Patient extremely hard of hearing, likely secondary to presbycusis.  Nephrology consulted for hyponatremia.     EDVS: afebrile, HR 80s, -130s/60s, RR 16, SpO2 97% RA  ED gave lasix 20 IV x 1  Labs notable for Na 116, Urine Osm 614, Urine Na 34, Serum Osm 143, BNP 48139, Cr at baseline 0.6 (08 Sep 2019 01:10)      PAST MEDICAL & SURGICAL HISTORY:  Osteoporosis  HLD (hyperlipidemia)  Hypertension  History of Prostate Cancer  H/O arthroplasty: Left shoulder.  S/P Prostatectomy  S/P Appendectomy      MEDICATIONS  (STANDING):  enoxaparin Injectable 40 milliGRAM(s) SubCutaneous daily  Home Medications:   * Patient Currently Takes Medications as of 08-Sep-2019 01:27 documented in Structured Notes  · 	melatonin 3 mg oral tablet: 1 tab(s) orally once a day (at bedtime), As needed, Insomnia  · 	aspirin 81 mg oral delayed release tablet: 1 tab(s) orally once a day  · 	acetaminophen 325 mg oral tablet: 3 tab(s) orally every 8 hours X 1 week, then PRN mild pain  · 	atorvastatin 20 mg oral tablet: 1 tab(s) orally once a day (at bedtime)  · 	Lupron:  intramuscular every 3 months  · 	amlodipine-benazepril 5 mg-10 mg oral capsule: 1 cap(s) orally once a day        Allergies  No Known Allergies    SOCIAL HISTORY:  Denies alcohol or tobacco abuse.    FAMILY HISTORY:  Family history of prostate cancer (Sibling)  Family history of breast cancer in male (Sibling)  No kidney disease in family.    REVIEW OF SYSTEMS:  CONSTITUTIONAL: Has weakness since he broke his left hip , denies fevers or chills  EYES/ENT: No visual changes. Hard of hearing.   NECK: No pain or stiffness  RESPIRATORY: Has cough, no wheezing, hemoptysis. No shortness of breath  CARDIOVASCULAR: No chest pain or palpitations  GASTROINTESTINAL: No abdominal or epigastric pain. No nausea, vomiting, or hematemesis. No diarrhea or constipation. No melena or hematochezia  GENITOURINARY: No dysuria, frequency or hematuria. No stones or infection  NEUROLOGICAL: No numbness or weakness  SKIN: No itching, burning, rashes, or lesions   All other review of systems is negative unless indicated above    VITAL:  T(C): , Max: 36.7 (09-08-19 @ 00:30)  T(F): , Max: 98.1 (09-08-19 @ 00:30)  HR: 78 (09-08-19 @ 00:30)  BP: 101/57 (09-08-19 @ 00:30)  BP(mean): 77 (09-07-19 @ 21:43)  RR: 18 (09-08-19 @ 00:30)  SpO2: 96% (09-08-19 @ 00:30)  Wt(kg): --    PHYSICAL EXAM:  General: Hard of hearing. NAD, Alert  HEENT: NCAT, PERRLA  Neck: Supple, No JVD  Respiratory: CTA-b/l  Cardiovascular: RRR s1s2, no m/r/g  Gastrointestinal: +BS, soft, NT/ND  Extremities: Has 3 +edema b/l  Neurological: no focal deficits; strength grossly intact  Psychiatric: Normal mood, normal affect  Skin: No rashes, no nevi      LABS:                        9.9    13.7  )-----------( 513      ( 07 Sep 2019 18:01 )             30.4     Na(116)/K(5.0)/Cl(81)/HCO3(19)/BUN(19)/Cr(0.60)Glu(111)/Ca(8.3)/Mg(1.9)/PO4(--)    09-07 @ 18:01  Na(114)/K(4.9)/Cl(80)/HCO3(23)/BUN(17)/Cr(0.62)Glu(106)/Ca(8.3)/Mg(--)/PO4(--)    09-07 @ 07:56  Na(116)/K(--)/Cl(--)/HCO3(--)/BUN(--)/Cr(--)Glu(--)/Ca(--)/Mg(--)/PO4(--)    09-06 @ 16:37  Na(113)/K(4.9)/Cl(81)/HCO3(23)/BUN(17)/Cr(0.67)Glu(97)/Ca(8.4)/Mg(--)/PO4(--)    09-06 @ 09:31    09-07    116<LL>  |  81<L>  |  19  ----------------------------<  111<H>  5.0   |  19<L>  |  0.60    Ca    8.3<L>      07 Sep 2019 18:01  Mg     1.9     09-07    TPro  6.0  /  Alb  3.4  /  TBili  0.9  /  DBili  x   /  AST  23  /  ALT  23  /  AlkPhos  107  09-07      Osmolality, Random Urine: 614 mos/kg (09-07 @ 20:44)  Sodium, Random Urine: 34 mmol/L (09-07 @ 20:44)    IMAGING:  INTERPRETATION:  CLINICAL INFORMATION: Cough.    EXAM: Radiograph of the chest.    COMPARISON: Chest radiograph from 8/20/2019.    FINDINGS:  The lungs are clear.  There is no evidence of pleural effusion or pneumothorax.  The cardiomediastinal silhouette cannot be accurately assessed in this   projection.  The visualized osseous and soft tissue structures demonstrate no acute   pathology.    IMPRESSION:   Clear lungs. No change when compared to previous study done August 20, 2019.

## 2019-09-09 LAB
ANION GAP SERPL CALC-SCNC: 10 MMOL/L — SIGNIFICANT CHANGE UP (ref 5–17)
ANION GAP SERPL CALC-SCNC: 10 MMOL/L — SIGNIFICANT CHANGE UP (ref 5–17)
ANION GAP SERPL CALC-SCNC: 11 MMOL/L — SIGNIFICANT CHANGE UP (ref 5–17)
ANION GAP SERPL CALC-SCNC: 11 MMOL/L — SIGNIFICANT CHANGE UP (ref 5–17)
BUN SERPL-MCNC: 16 MG/DL — SIGNIFICANT CHANGE UP (ref 7–23)
BUN SERPL-MCNC: 21 MG/DL — SIGNIFICANT CHANGE UP (ref 7–23)
CALCIUM SERPL-MCNC: 8.2 MG/DL — LOW (ref 8.4–10.5)
CALCIUM SERPL-MCNC: 8.2 MG/DL — LOW (ref 8.4–10.5)
CALCIUM SERPL-MCNC: 8.3 MG/DL — LOW (ref 8.4–10.5)
CALCIUM SERPL-MCNC: 8.7 MG/DL — SIGNIFICANT CHANGE UP (ref 8.4–10.5)
CHLORIDE SERPL-SCNC: 91 MMOL/L — LOW (ref 96–108)
CHLORIDE SERPL-SCNC: 91 MMOL/L — LOW (ref 96–108)
CHLORIDE SERPL-SCNC: 92 MMOL/L — LOW (ref 96–108)
CHLORIDE SERPL-SCNC: 92 MMOL/L — LOW (ref 96–108)
CO2 SERPL-SCNC: 21 MMOL/L — LOW (ref 22–31)
CO2 SERPL-SCNC: 23 MMOL/L — SIGNIFICANT CHANGE UP (ref 22–31)
CO2 SERPL-SCNC: 23 MMOL/L — SIGNIFICANT CHANGE UP (ref 22–31)
CO2 SERPL-SCNC: 24 MMOL/L — SIGNIFICANT CHANGE UP (ref 22–31)
CREAT SERPL-MCNC: 0.7 MG/DL — SIGNIFICANT CHANGE UP (ref 0.5–1.3)
CREAT SERPL-MCNC: 0.72 MG/DL — SIGNIFICANT CHANGE UP (ref 0.5–1.3)
CREAT SERPL-MCNC: 0.72 MG/DL — SIGNIFICANT CHANGE UP (ref 0.5–1.3)
CREAT SERPL-MCNC: 0.98 MG/DL — SIGNIFICANT CHANGE UP (ref 0.5–1.3)
FERRITIN SERPL-MCNC: 559 NG/ML — HIGH (ref 30–400)
GLUCOSE SERPL-MCNC: 104 MG/DL — HIGH (ref 70–99)
GLUCOSE SERPL-MCNC: 111 MG/DL — HIGH (ref 70–99)
GLUCOSE SERPL-MCNC: 114 MG/DL — HIGH (ref 70–99)
GLUCOSE SERPL-MCNC: 130 MG/DL — HIGH (ref 70–99)
HCT VFR BLD CALC: 28.5 % — LOW (ref 39–50)
HGB BLD-MCNC: 9.5 G/DL — LOW (ref 13–17)
IRON SATN MFR SERPL: 14 % — LOW (ref 16–55)
IRON SATN MFR SERPL: 28 UG/DL — LOW (ref 45–165)
MAGNESIUM SERPL-MCNC: 2.2 MG/DL — SIGNIFICANT CHANGE UP (ref 1.6–2.6)
MCHC RBC-ENTMCNC: 29.6 PG — SIGNIFICANT CHANGE UP (ref 27–34)
MCHC RBC-ENTMCNC: 33.3 GM/DL — SIGNIFICANT CHANGE UP (ref 32–36)
MCV RBC AUTO: 88.8 FL — SIGNIFICANT CHANGE UP (ref 80–100)
PHOSPHATE SERPL-MCNC: 2.6 MG/DL — SIGNIFICANT CHANGE UP (ref 2.5–4.5)
PLATELET # BLD AUTO: 448 K/UL — HIGH (ref 150–400)
POTASSIUM SERPL-MCNC: 4.3 MMOL/L — SIGNIFICANT CHANGE UP (ref 3.5–5.3)
POTASSIUM SERPL-MCNC: 4.4 MMOL/L — SIGNIFICANT CHANGE UP (ref 3.5–5.3)
POTASSIUM SERPL-SCNC: 4.3 MMOL/L — SIGNIFICANT CHANGE UP (ref 3.5–5.3)
POTASSIUM SERPL-SCNC: 4.4 MMOL/L — SIGNIFICANT CHANGE UP (ref 3.5–5.3)
RBC # BLD: 3.21 M/UL — LOW (ref 4.2–5.8)
RBC # FLD: 13.6 % — SIGNIFICANT CHANGE UP (ref 10.3–14.5)
SODIUM SERPL-SCNC: 123 MMOL/L — LOW (ref 135–145)
SODIUM SERPL-SCNC: 125 MMOL/L — LOW (ref 135–145)
SODIUM SERPL-SCNC: 125 MMOL/L — LOW (ref 135–145)
SODIUM SERPL-SCNC: 126 MMOL/L — LOW (ref 135–145)
TIBC SERPL-MCNC: 196 UG/DL — LOW (ref 220–430)
UIBC SERPL-MCNC: 168 UG/DL — SIGNIFICANT CHANGE UP (ref 110–370)
WBC # BLD: 9.24 K/UL — SIGNIFICANT CHANGE UP (ref 3.8–10.5)
WBC # FLD AUTO: 9.24 K/UL — SIGNIFICANT CHANGE UP (ref 3.8–10.5)

## 2019-09-09 PROCEDURE — 93010 ELECTROCARDIOGRAM REPORT: CPT

## 2019-09-09 PROCEDURE — 99232 SBSQ HOSP IP/OBS MODERATE 35: CPT | Mod: GC

## 2019-09-09 PROCEDURE — 99233 SBSQ HOSP IP/OBS HIGH 50: CPT | Mod: GC

## 2019-09-09 RX ORDER — ATORVASTATIN CALCIUM 80 MG/1
20 TABLET, FILM COATED ORAL AT BEDTIME
Refills: 0 | Status: DISCONTINUED | OUTPATIENT
Start: 2019-09-09 | End: 2019-09-11

## 2019-09-09 RX ORDER — FUROSEMIDE 40 MG
40 TABLET ORAL ONCE
Refills: 0 | Status: COMPLETED | OUTPATIENT
Start: 2019-09-09 | End: 2019-09-09

## 2019-09-09 RX ORDER — ASPIRIN/CALCIUM CARB/MAGNESIUM 324 MG
81 TABLET ORAL DAILY
Refills: 0 | Status: DISCONTINUED | OUTPATIENT
Start: 2019-09-09 | End: 2019-09-12

## 2019-09-09 RX ADMIN — Medication 40 MILLIGRAM(S): at 14:43

## 2019-09-09 RX ADMIN — ATORVASTATIN CALCIUM 20 MILLIGRAM(S): 80 TABLET, FILM COATED ORAL at 21:21

## 2019-09-09 RX ADMIN — Medication 81 MILLIGRAM(S): at 13:22

## 2019-09-09 RX ADMIN — ENOXAPARIN SODIUM 40 MILLIGRAM(S): 100 INJECTION SUBCUTANEOUS at 13:22

## 2019-09-09 NOTE — PROGRESS NOTE ADULT - ASSESSMENT
Mr. Dennison is an 89 yo man hx prostate CA (s/p prostate resection), HTN, and HLD sent from [facility] for hyponatremia (114), clinically hypervolemic, found with elevated BNP and EKG findings c/w ischemia. Nephrology consulted for hyponatemia       RECOMMEND:      - SP  Samsca 15 mg x 1 yesterday    - Ensure TID to increase protein content    - Recommend iron panel and ferritin.  -DC salt restriction from the diet as well           With warm regards,       [employer]  [phone]

## 2019-09-09 NOTE — PROGRESS NOTE ADULT - PROBLEM SELECTOR PLAN 2
- Findings suggestive of AS given age, new RUSB systolic murmur, worsening exercise intolerance, PND, possible syncopal episodes. Low suspicion of ischemic etiology given troponin wnl, no chest pain  - ECG: new TWI - no plans for intervention at this time per cards  - BNP: >18K  -TTE pending  - Cardiology following

## 2019-09-09 NOTE — PHYSICAL THERAPY INITIAL EVALUATION ADULT - TRANSFER TRAINING, PT EVAL
GOAL: Pt will perform all transfers with or without appropriate assistive device with min A in 4 weeks

## 2019-09-09 NOTE — PROGRESS NOTE ADULT - ATTENDING COMMENTS
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  Volume overloaded on exam, likely would benefit from eventual diuresis, clear with renal prior to dosing loop diuretic as this will cause further elevation in his sodium.

## 2019-09-09 NOTE — PROGRESS NOTE ADULT - SUBJECTIVE AND OBJECTIVE BOX
SUBJ:  NAEO. Endorses increased urination overnight     Otherwise denies chest pain, shortness of breath, orthopnea, cough, nausea, vomiting, diarrhea, fevers, chills, or  headache.    MEDICATIONS  (STANDING):  enoxaparin Injectable 40 milliGRAM(s) SubCutaneous daily    Vital Signs Last 24 Hrs  T(C): 36.9 (09 Sep 2019 07:50), Max: 37 (08 Sep 2019 09:48)  T(F): 98.4 (09 Sep 2019 07:50), Max: 98.6 (08 Sep 2019 09:48)  HR: 84 (09 Sep 2019 07:50) (72 - 89)  BP: 111/70 (09 Sep 2019 07:50) (102/64 - 120/70)  BP(mean): --  RR: 18 (09 Sep 2019 07:50) (17 - 19)  SpO2: 96% (09 Sep 2019 07:50) (95% - 97%)    PHYSICAL EXAM:  · CONSTITUTIONAL: Well-developed, elderly man, accompanied with daughter.  · NECK:	No bruits; no thyromegaly or nodules. No JVD  ·BACK:	+Kyphosis   ·RESPIRATORY:   airway patent; breath sounds equal; good air movement; respirations non-labored; clear to auscultation bilaterally; no chest wall tenderness; no intercostal retractions; no rales,rhonchi or wheeze  · CARDIOVASCULAR	+Grade 3 murmur best heard in RUSB. regular rate and rhythm. No rubs. No JVD.   . GASTROINTESTINAL:  no distention; no masses palpable; bowel sounds normal; no rebound tenderness; no guarding; no rigidity; no organomegaly  · EXTREMITIES: 3+ pitting edema to knees. Warm and well perfused. Symmetric. No cyanosis, clubbing.  · VASCULAR: 	Equal and normal pulses (carotid, femoral, dorsalis pedis)  ·NEUROLOGICAL:   alert and oriented x 3; moves all extremities on command. Hard of hearing  .  LABS:                        9.5    9.24  )-----------( 448      ( 09 Sep 2019 08:16 )             28.5     09-09    125<L>  |  92<L>  |  16  ----------------------------<  104<H>  4.3   |  23  |  0.72    Ca    8.2<L>      09 Sep 2019 06:25  Phos  2.6     09-09  Mg     2.2     09-09    TPro  6.0  /  Alb  3.4  /  TBili  0.9  /  DBili  x   /  AST  23  /  ALT  23  /  AlkPhos  107  09-07    CARDIAC MARKERS ( 07 Sep 2019 18:01 )  x     / x     / 64 U/L / x     / 4.8 ng/mL      PT/INR - ( 07 Sep 2019 18:01 )   PT: 12.2 sec;   INR: 1.07 ratio         PTT - ( 07 Sep 2019 18:01 )  PTT:28.2 sec  Creatinine Trend: 0.72<--, 0.72<--, 0.62<--, 0.79<--, 0.68<--, 0.64<--  I&O's Summary    08 Sep 2019 07:01  -  09 Sep 2019 07:00  --------------------------------------------------------  IN: 800 mL / OUT: 1400 mL / NET: -600 mL      BNP  RADIOLOGY & ADDITIONAL STUDIES:    IMPRESSION AND PLAN:

## 2019-09-09 NOTE — PHYSICAL THERAPY INITIAL EVALUATION ADULT - GENERAL OBSERVATIONS, REHAB EVAL
Received in bed, +IVL, +tele, no complaints, agreeable to PT, daughter at bedside assisting with communicating with pt who is very hard of hearing

## 2019-09-09 NOTE — PHYSICAL THERAPY INITIAL EVALUATION ADULT - GAIT TRAINING, PT EVAL
GOAL: Pt will ambulate with or without appropriate assistive device x 50 feet with min A in 4 weeks.

## 2019-09-09 NOTE — PROGRESS NOTE ADULT - ASSESSMENT
89 y/o man with h/o prostate ca s/p prostate resection, HTN/HLD, recent hip surgery 8/2018 who presents with 1 month of worsening b/l LE edema, found to be hyponatremic to 114.     #Hyponatremia  -Na improved to 125, will defer to nephro for further tolvaptan dosage    #Volume overload  -Obtain TTE, hold off on lasix if pt is to receive more Tolvaptan    Ric Mccallum PGY4  [phone]  All Cardiology service information can be found 24/7 on [email or web address], password: [email or web address] 89 y/o man with h/o prostate ca s/p prostate resection, HTN/HLD, recent hip surgery 8/2018.  Presents with 1 month of worsening b/l LE edema, found to be hyponatremic to 114.       REC:  #1. Hyponatremia  - Na improved to 125, will defer to nephro for further tolvaptan dosage    #2.  Volume overload  - Obtain TTE  - hold off on Lasix if pt is to receive more Tolvaptan      Ric Mccallum PGY4  [phone]  All Cardiology service information can be found 24/7 on [email or web address], password: [email or web address]    Norris Salgado M.D.  Cardiology Attending, Consult Service  [phone]

## 2019-09-09 NOTE — PHYSICAL THERAPY INITIAL EVALUATION ADULT - GAIT DEVIATIONS NOTED, PT EVAL
decreased theresa/decreased velocity of limb motion/decreased step length/increased time in double stance

## 2019-09-09 NOTE — PHYSICAL THERAPY INITIAL EVALUATION ADULT - PERTINENT HX OF CURRENT PROBLEM, REHAB EVAL
89 yo man hx prostate CA (s/p prostate resection), HTN, and HLD sent from [facility] for hyponatremia (114), clinically hypervolemic, found with elevated BNP and EKG findings c/w ischemia 89 yo man hx prostate CA (s/p prostate resection), HTN, and HLD sent from [facility] for hyponatremia (114), clinically hypervolemic, found with elevated BNP and EKG findings c/w ischemia. History of L hip IMN on 8/21/19

## 2019-09-09 NOTE — PROGRESS NOTE ADULT - SUBJECTIVE AND OBJECTIVE BOX
Ric Mccallum  [phone]  All Cardiology service information can be found 24/7 on [email or web address], password: [email or web address]    Patient seen and examined at bedside.    Overnight Events: No events overnight. Pt hard of hearing, denies any chest pain, sob, or palpitations.     CONSTITUTIONAL:  No fevers or chills  HEENT: No rhinorrhea   SKIN:  No rash or itching.  CARDIOVASCULAR:  No chest pain, chest pressure or chest discomfort.  RESPIRATORY:  No shortness of breath, cough or sputum.  GASTROINTESTINAL:  No nausea, vomiting or diarrhea. No abdominal pain.   GENITOURINARY:  Denies hematuria, dysuria.   NEUROLOGICAL:  No headache, dizziness, syncope.   MUSCULOSKELETAL:  No muscle, back pain, joint pain or stiffness.  HEMATOLOGIC:  No bleeding or bruising.    Current Meds:  aspirin enteric coated 81 milliGRAM(s) Oral daily  atorvastatin 20 milliGRAM(s) Oral at bedtime  enoxaparin Injectable 40 milliGRAM(s) SubCutaneous daily      Vitals:  T(F): 98.4 (09-09), Max: 98.4 (09-08)  HR: 95 (09-09) (80 - 95)  BP: 125/68 (09-09) (102/64 - 125/68)  RR: 20 (09-09)  SpO2: 98% (09-09)  I&O's Summary    08 Sep 2019 07:01  -  09 Sep 2019 07:00  --------------------------------------------------------  IN: 800 mL / OUT: 1400 mL / NET: -600 mL        Physical Exam:  Appearance: No acute distress  Eyes: PERRL, EOMI, pink conjunctiva  HEENT: Normal oral mucosa  Cardiovascular: RRR, S1, S2, 3+ b/l pitting LE  Respiratory: Clear to auscultation bilaterally  Gastrointestinal: soft, non-tender, non-distended with normal bowel sounds  Musculoskeletal: No clubbing; no joint deformity   Neurologic: Non-focal  Lymphatic: No lymphadenopathy  Psychiatry: mood & affect appropriate  Skin: No rashes, ecchymoses, or cyanosis                          9.5    9.24  )-----------( 448      ( 09 Sep 2019 08:16 )             28.5     09-09    125<L>  |  92<L>  |  16  ----------------------------<  104<H>  4.3   |  23  |  0.72    Ca    8.2<L>      09 Sep 2019 06:25  Phos  2.6     09-09  Mg     2.2     09-09    TPro  6.0  /  Alb  3.4  /  TBili  0.9  /  DBili  x   /  AST  23  /  ALT  23  /  AlkPhos  107  09-07    PT/INR - ( 07 Sep 2019 18:01 )   PT: 12.2 sec;   INR: 1.07 ratio         PTT - ( 07 Sep 2019 18:01 )  PTT:28.2 sec  CARDIAC MARKERS ( 07 Sep 2019 21:58 )  36 ng/L / x     / x     / x     / x     / x      CARDIAC MARKERS ( 07 Sep 2019 18:01 )  35 ng/L / x     / x     / 64 U/L / x     / 4.8 ng/mL      Serum Pro-Brain Natriuretic Peptide: [id number] pg/mL (09-07 @ 18:01)          New ECG(s): Personally reviewed      Interpretation of Telemetry: SR 70-80s Ric Mccallum  [phone]  All Cardiology service information can be found 24/7 on [email or web address], password: [email or web address]      Patient seen and examined at bedside.    Overnight Events: No events overnight. Pt hard of hearing, denies any chest pain, sob, or palpitations.     CONSTITUTIONAL:  No fevers or chills  HEENT: No rhinorrhea   SKIN:  No rash or itching.  CARDIOVASCULAR:  No chest pain, chest pressure or chest discomfort.  RESPIRATORY:  No shortness of breath, cough or sputum.  GASTROINTESTINAL:  No nausea, vomiting or diarrhea. No abdominal pain.   GENITOURINARY:  Denies hematuria, dysuria.   NEUROLOGICAL:  No headache, dizziness, syncope.   MUSCULOSKELETAL:  No muscle, back pain, joint pain or stiffness.  HEMATOLOGIC:  No bleeding or bruising.    Current Meds:  aspirin enteric coated 81 milliGRAM(s) Oral daily  atorvastatin 20 milliGRAM(s) Oral at bedtime  enoxaparin Injectable 40 milliGRAM(s) SubCutaneous daily      Vitals:  T(F): 98.4 (09-09), Max: 98.4 (09-08)  HR: 95 (09-09) (80 - 95)  BP: 125/68 (09-09) (102/64 - 125/68)  RR: 20 (09-09)  SpO2: 98% (09-09)      Physical Exam:  Appearance: No acute distress  Eyes: PERRL, EOMI, pink conjunctiva  HEENT: Normal oral mucosa  Cardiovascular: RRR, S1, S2, 3+ b/l pitting LE  Respiratory: Clear to auscultation bilaterally  Gastrointestinal: soft, non-tender, non-distended with normal bowel sounds  Musculoskeletal: No clubbing; no joint deformity   Neurologic: Non-focal  Lymphatic: No lymphadenopathy  Psychiatry: mood & affect appropriate  Skin: No rashes, ecchymoses, or cyanosis    I&O's Summary    08 Sep 2019 07:01  -  09 Sep 2019 07:00  --------------------------------------------------------  IN: 800 mL / OUT: 1400 mL / NET: -600 mL      LABS:                      9.5    9.24  )-----------( 448      ( 09 Sep 2019 08:16 )             28.5     09-09  125<L>  |  92<L>  |  16  ----------------------------<  104<H>  4.3   |  23  |  0.72    Ca    8.2<L>      09 Sep 2019 06:25  Phos  2.6     09-09  Mg     2.2     09-09    TPro  6.0  /  Alb  3.4  /  TBili  0.9  /  DBili  x   /  AST  23  /  ALT  23  /  AlkPhos  107  09-07    PT/INR - ( 07 Sep 2019 18:01 )   PT: 12.2 sec;   INR: 1.07 ratio    PTT - ( 07 Sep 2019 18:01 )  PTT:28.2 sec      CARDIAC MARKERS ( 07 Sep 2019 21:58 )  36 ng/L / x     / x     / x     / x     / x      CARDIAC MARKERS ( 07 Sep 2019 18:01 )  35 ng/L / x     / x     / 64 U/L / x     / 4.8 ng/mL    Serum Pro-Brain Natriuretic Peptide: [id number] pg/mL (09-07 @ 18:01)      Interpretation of Telemetry: SR 70-80s

## 2019-09-09 NOTE — PROGRESS NOTE ADULT - ASSESSMENT
*****INCOMPLETE******  *****INCOMPLETE******  *****INCOMPLETE******  *****INCOMPLETE******  89M with PMHx of recent L hip surgery fixation, multiple falls, prostate cancer (s/p prostatectomy, on Lupron) and HTN who presents from rehab for management of asymptomatic hypervolemic hypotonic hyponatremia which is likely chronic in nature.     Etiology is most concerning for SIADH 2/2 recent stress of surgery vs. CHF (possibly 2/2 AS), given his age, RUSB murmur, worsening exercise intolerance (2 blocks), and recent history  multiple falls. 89M with PMHx of recent L hip surgery fixation, multiple falls, prostate cancer (s/p prostatectomy, on Lupron) and HTN who presents from rehab for management of asymptomatic hypervolemic hypotonic hyponatremia.    Etiology is most concerning for SIADH 2/2 recent stress of surgery vs. CHF (possibly 2/2 AS), given his age, RUSB murmur, worsening exercise intolerance (2 blocks), and recent history  multiple falls.

## 2019-09-09 NOTE — PROGRESS NOTE ADULT - PROBLEM SELECTOR PLAN 1
- Hyponatremia (116) Hypervolemic, hypotonic, likely sub-acute given previous workup 1 week PTA with similar hypoNa as outpatient.  - Urine studies suggestive of SIADH etiology, most likely 2/2 recent surgery, post procedure pain vs. CHF given worsening exercise intolerance vs. renal dysfunction.   -s/p lasix 20 IV in ED  - Samsca 15mg x 1 (9/8)  - AM cortisol wnl  - Uric acid wnl  - TSH pending   - Strict IO, daily weights   - BMP Q6; correct Na .5/hr, <8/24hours given chronicity.   - Hold lasix   - Workup for HF and renal dysfunction as below. - Hyponatremia (116) Hypervolemic, hypotonic, likely sub-acute given previous workup 1 week PTA with similar hypoNa as outpatient.  - Urine studies suggestive of SIADH etiology, most likely 2/2 recent surgery, post procedure pain vs. CHF given worsening exercise intolerance vs. renal dysfunction.   -s/p lasix 20 IV in ED  - Samsca 15mg x 1 (9/8)  - AM cortisol /Uric acid / TSH wnl  - Strict IO, daily weights   - Will start lasix diuresis pending nephrology recommendations  - BMP Q8;  - Workup for HF and renal dysfunction as below.

## 2019-09-09 NOTE — PROGRESS NOTE ADULT - SUBJECTIVE AND OBJECTIVE BOX
NEPHROLOGY-[facility] [phone]        Patient seen and examined         MEDICATIONS  (STANDING):  aspirin enteric coated 81 milliGRAM(s) Oral daily  atorvastatin 20 milliGRAM(s) Oral at bedtime  enoxaparin Injectable 40 milliGRAM(s) SubCutaneous daily      VITAL:  T(C): , Max: 36.9 (09-08-19 @ 20:55)  T(F): , Max: 98.4 (09-08-19 @ 20:55)  HR: 87 (09-09-19 @ 11:42)  BP: 120/61 (09-09-19 @ 11:42)  BP(mean): --  RR: 18 (09-09-19 @ 11:42)  SpO2: 95% (09-09-19 @ 11:42)  Wt(kg): --    I and O's:    09-08 @ 07:01  -  09-09 @ 07:00  --------------------------------------------------------  IN: 800 mL / OUT: 1400 mL / NET: -600 mL          PHYSICAL EXAM:    Constitutional: NAD  Neck:  No JVD  Respiratory: CTAB/L  Cardiovascular: S1 and S2  Gastrointestinal: BS+, soft, NT/ND  Extremities: No peripheral edema  Neurological: A/O x 3, no focal deficits  Psychiatric: Normal mood, normal affect  : No Carrizales  Skin: No rashes  Access: Not applicable    LABS:                        9.5    9.24  )-----------( 448      ( 09 Sep 2019 08:16 )             28.5     09-09    125<L>  |  91<L>  |  16  ----------------------------<  114<H>  4.4   |  23  |  0.70    Ca    8.7      09 Sep 2019 12:53  Phos  2.6     09-09  Mg     2.2     09-09    TPro  6.0  /  Alb  3.4  /  TBili  0.9  /  DBili  x   /  AST  23  /  ALT  23  /  AlkPhos  107  09-07          Urine Studies:    Osmolality, Random Urine: 614 mos/kg (09-07 @ 20:44)  Sodium, Random Urine: 34 mmol/L (09-07 @ 20:44)        RADIOLOGY & ADDITIONAL STUDIES: NEPHROLOGY[facility] [phone]        Patient seen and examined in bed and pleasant   seen with daughter at bedside         MEDICATIONS  (STANDING):  aspirin enteric coated 81 milliGRAM(s) Oral daily  atorvastatin 20 milliGRAM(s) Oral at bedtime  enoxaparin Injectable 40 milliGRAM(s) SubCutaneous daily      VITAL:  T(C): , Max: 36.9 (09-08-19 @ 20:55)  T(F): , Max: 98.4 (09-08-19 @ 20:55)  HR: 87 (09-09-19 @ 11:42)  BP: 120/61 (09-09-19 @ 11:42)  BP(mean): --  RR: 18 (09-09-19 @ 11:42)  SpO2: 95% (09-09-19 @ 11:42)  Wt(kg): --    I and O's:    09-08 @ 07:01  -  09-09 @ 07:00  --------------------------------------------------------  IN: 800 mL / OUT: 1400 mL / NET: -600 mL          PHYSICAL EXAM:    Constitutional: NAD  Neck:  No JVD  Respiratory: CTAB/L  Cardiovascular: S1 and S2  Gastrointestinal: BS+, soft, NT/ND  Extremities: No peripheral edema  Neurological: A/O x 3, no focal deficits  Psychiatric: Normal mood, normal affect  : No Carrizales  Skin: No rashes  Access: Not applicable    LABS:                        9.5    9.24  )-----------( 448      ( 09 Sep 2019 08:16 )             28.5     09-09    125<L>  |  91<L>  |  16  ----------------------------<  114<H>  4.4   |  23  |  0.70    Ca    8.7      09 Sep 2019 12:53  Phos  2.6     09-09  Mg     2.2     09-09    TPro  6.0  /  Alb  3.4  /  TBili  0.9  /  DBili  x   /  AST  23  /  ALT  23  /  AlkPhos  107  09-07          Urine Studies:    Osmolality, Random Urine: 614 mos/kg (09-07 @ 20:44)  Sodium, Random Urine: 34 mmol/L (09-07 @ 20:44)        RADIOLOGY & ADDITIONAL STUDIES:

## 2019-09-09 NOTE — PHYSICAL THERAPY INITIAL EVALUATION ADULT - BALANCE TRAINING, PT EVAL
GOAL: Pt will demonstrate improved static/dynamic balance to fair+ in order to improve stability, decrease fall risk, and increase independence in ADLs within 4 weeks.

## 2019-09-09 NOTE — PROGRESS NOTE ADULT - PROBLEM SELECTOR PLAN 4
- Possibly 2/2 renal etiology vs. poor PO intake ,  - iron panel, ferritin, LDH, haptoglobin, pending - Possibly 2/2 renal etiology vs. poor PO intake

## 2019-09-09 NOTE — PHYSICAL THERAPY INITIAL EVALUATION ADULT - ADDITIONAL COMMENTS
Pt admitted from subacute rehab following L hip IMN. Prior to hospitalizations, pt lived in a private house with spouse, daughter, son & his family. Pt was independent in ambulation with occasional use of straight cane.

## 2019-09-10 LAB
ANION GAP SERPL CALC-SCNC: 10 MMOL/L — SIGNIFICANT CHANGE UP (ref 5–17)
BUN SERPL-MCNC: 20 MG/DL — SIGNIFICANT CHANGE UP (ref 7–23)
CALCIUM SERPL-MCNC: 7.5 MG/DL — LOW (ref 8.4–10.5)
CHLORIDE SERPL-SCNC: 96 MMOL/L — SIGNIFICANT CHANGE UP (ref 96–108)
CO2 SERPL-SCNC: 22 MMOL/L — SIGNIFICANT CHANGE UP (ref 22–31)
CREAT SERPL-MCNC: 0.71 MG/DL — SIGNIFICANT CHANGE UP (ref 0.5–1.3)
GLUCOSE SERPL-MCNC: 101 MG/DL — HIGH (ref 70–99)
HCT VFR BLD CALC: 24.8 % — LOW (ref 39–50)
HGB BLD-MCNC: 8.1 G/DL — LOW (ref 13–17)
MAGNESIUM SERPL-MCNC: 2 MG/DL — SIGNIFICANT CHANGE UP (ref 1.6–2.6)
MCHC RBC-ENTMCNC: 29.3 PG — SIGNIFICANT CHANGE UP (ref 27–34)
MCHC RBC-ENTMCNC: 32.7 GM/DL — SIGNIFICANT CHANGE UP (ref 32–36)
MCV RBC AUTO: 89.9 FL — SIGNIFICANT CHANGE UP (ref 80–100)
PHOSPHATE SERPL-MCNC: 2.4 MG/DL — LOW (ref 2.5–4.5)
PLATELET # BLD AUTO: 402 K/UL — HIGH (ref 150–400)
POTASSIUM SERPL-MCNC: 3.6 MMOL/L — SIGNIFICANT CHANGE UP (ref 3.5–5.3)
POTASSIUM SERPL-SCNC: 3.6 MMOL/L — SIGNIFICANT CHANGE UP (ref 3.5–5.3)
RBC # BLD: 2.76 M/UL — LOW (ref 4.2–5.8)
RBC # FLD: 13.8 % — SIGNIFICANT CHANGE UP (ref 10.3–14.5)
SODIUM SERPL-SCNC: 128 MMOL/L — LOW (ref 135–145)
WBC # BLD: 8.64 K/UL — SIGNIFICANT CHANGE UP (ref 3.8–10.5)
WBC # FLD AUTO: 8.64 K/UL — SIGNIFICANT CHANGE UP (ref 3.8–10.5)

## 2019-09-10 PROCEDURE — 99232 SBSQ HOSP IP/OBS MODERATE 35: CPT | Mod: GC

## 2019-09-10 PROCEDURE — 93306 TTE W/DOPPLER COMPLETE: CPT | Mod: 26

## 2019-09-10 PROCEDURE — 99233 SBSQ HOSP IP/OBS HIGH 50: CPT | Mod: GC

## 2019-09-10 RX ORDER — SENNA PLUS 8.6 MG/1
1 TABLET ORAL DAILY
Refills: 0 | Status: DISCONTINUED | OUTPATIENT
Start: 2019-09-10 | End: 2019-09-12

## 2019-09-10 RX ORDER — FUROSEMIDE 40 MG
40 TABLET ORAL ONCE
Refills: 0 | Status: COMPLETED | OUTPATIENT
Start: 2019-09-10 | End: 2019-09-10

## 2019-09-10 RX ORDER — POLYETHYLENE GLYCOL 3350 17 G/17G
17 POWDER, FOR SOLUTION ORAL ONCE
Refills: 0 | Status: COMPLETED | OUTPATIENT
Start: 2019-09-10 | End: 2019-09-10

## 2019-09-10 RX ORDER — DOCUSATE SODIUM 100 MG
100 CAPSULE ORAL DAILY
Refills: 0 | Status: DISCONTINUED | OUTPATIENT
Start: 2019-09-10 | End: 2019-09-12

## 2019-09-10 RX ORDER — FUROSEMIDE 40 MG
40 TABLET ORAL ONCE
Refills: 0 | Status: DISCONTINUED | OUTPATIENT
Start: 2019-09-10 | End: 2019-09-10

## 2019-09-10 RX ADMIN — Medication 81 MILLIGRAM(S): at 13:09

## 2019-09-10 RX ADMIN — Medication 100 MILLIGRAM(S): at 14:20

## 2019-09-10 RX ADMIN — ENOXAPARIN SODIUM 40 MILLIGRAM(S): 100 INJECTION SUBCUTANEOUS at 13:10

## 2019-09-10 RX ADMIN — SENNA PLUS 1 TABLET(S): 8.6 TABLET ORAL at 14:20

## 2019-09-10 RX ADMIN — Medication 40 MILLIGRAM(S): at 16:49

## 2019-09-10 RX ADMIN — ATORVASTATIN CALCIUM 20 MILLIGRAM(S): 80 TABLET, FILM COATED ORAL at 21:07

## 2019-09-10 RX ADMIN — POLYETHYLENE GLYCOL 3350 17 GRAM(S): 17 POWDER, FOR SOLUTION ORAL at 14:20

## 2019-09-10 NOTE — PROGRESS NOTE ADULT - SUBJECTIVE AND OBJECTIVE BOX
Ric Mccallum  [phone]  All Cardiology service information can be found 24/7 on [email or web address], password: [email or web address]    Patient seen and examined at bedside.    Overnight Events: No events overnight. Pt denies any chest pain, sob, or palpitations.     CONSTITUTIONAL:  No fevers or chills  HEENT: No rhinorrhea   SKIN:  No rash or itching.  CARDIOVASCULAR:  No chest pain, chest pressure or chest discomfort. No palpitations or edema.  RESPIRATORY:  No shortness of breath, cough or sputum.  GASTROINTESTINAL:  No nausea, vomiting or diarrhea. No abdominal pain.   GENITOURINARY:  Denies hematuria, dysuria.   NEUROLOGICAL:  No headache, dizziness, syncope.   MUSCULOSKELETAL:  No muscle, back pain, joint pain or stiffness.  HEMATOLOGIC:  No bleeding or bruising.      Current Meds:  aspirin enteric coated 81 milliGRAM(s) Oral daily  atorvastatin 20 milliGRAM(s) Oral at bedtime  enoxaparin Injectable 40 milliGRAM(s) SubCutaneous daily      Vitals:  T(F): 98.4 (09-10), Max: 98.4 (09-09)  HR: 89 (09-10) (80 - 95)  BP: 116/64 (09-10) (111/70 - 125/68)  RR: 18 (09-10)  SpO2: 97% (09-10)  I&O's Summary    09 Sep 2019 07:01  -  10 Sep 2019 07:00  --------------------------------------------------------  IN: 820 mL / OUT: 1725 mL / NET: -905 mL        Physical Exam:  Appearance: No acute distress  Eyes: PERRL, EOMI, pink conjunctiva  HEENT: Normal oral mucosa, hard of hearing   Cardiovascular: RRR, S1, S2, 2+ b/l pitting LE edema   Respiratory: Clear to auscultation bilaterally  Gastrointestinal: soft, non-tender, non-distended with normal bowel sounds  Musculoskeletal: No clubbing; no joint deformity   Neurologic: Non-focal  Lymphatic: No lymphadenopathy  Psychiatry: mood & affect appropriate  Skin: No rashes, ecchymoses, or cyanosis                          9.5    9.24  )-----------( 448      ( 09 Sep 2019 08:16 )             28.5     09-09    126<L>  |  92<L>  |  21  ----------------------------<  130<H>  4.3   |  24  |  0.98    Ca    8.3<L>      09 Sep 2019 22:35  Phos  2.6     09-09  Mg     2.2     09-09        CARDIAC MARKERS ( 07 Sep 2019 21:58 )  36 ng/L / x     / x     / x     / x     / x      CARDIAC MARKERS ( 07 Sep 2019 18:01 )  35 ng/L / x     / x     / 64 U/L / x     / 4.8 ng/mL      Serum Pro-Brain Natriuretic Peptide: 68088 pg/mL (09-07 @ 18:01)        Interpretation of Telemetry: SR: 70-80s Ric Mccallum  [phone]  All Cardiology service information can be found 24/7 on [email or web address], password: [email or web address]        Patient seen and examined at bedside.    Overnight Events: No events overnight. Pt denies any chest pain, sob, or palpitations.     CONSTITUTIONAL:  No fevers or chills  HEENT: No rhinorrhea   SKIN:  No rash or itching.  CARDIOVASCULAR:  No chest pain, chest pressure or chest discomfort. No palpitations or edema.  RESPIRATORY:  No shortness of breath, cough or sputum.  GASTROINTESTINAL:  No nausea, vomiting or diarrhea. No abdominal pain.   GENITOURINARY:  Denies hematuria, dysuria.   NEUROLOGICAL:  No headache, dizziness, syncope.   MUSCULOSKELETAL:  No muscle, back pain, joint pain or stiffness.  HEMATOLOGIC:  No bleeding or bruising.      Current Meds:  aspirin enteric coated 81 milliGRAM(s) Oral daily  atorvastatin 20 milliGRAM(s) Oral at bedtime  enoxaparin Injectable 40 milliGRAM(s) SubCutaneous daily      Vitals:  T(F): 98.4 (09-10), Max: 98.4 (09-09)  HR: 89 (09-10) (80 - 95)  BP: 116/64 (09-10) (111/70 - 125/68)  RR: 18 (09-10)  SpO2: 97% (09-10)    Physical Exam:  Appearance: No acute distress  Eyes: PERRL, EOMI, pink conjunctiva  HEENT: Normal oral mucosa, hard of hearing   Cardiovascular: RRR, S1, S2, 2+ b/l pitting LE edema   Respiratory: Clear to auscultation bilaterally  Gastrointestinal: soft, non-tender, non-distended with normal bowel sounds  Musculoskeletal: No clubbing; no joint deformity   Neurologic: Non-focal  Lymphatic: No lymphadenopathy  Psychiatry: mood & affect appropriate  Skin: No rashes, ecchymoses, or cyanosis      I&O's Summary    09 Sep 2019 07:01  -  10 Sep 2019 07:00  --------------------------------------------------------  IN: 820 mL / OUT: 1725 mL / NET: -905 mL      LABS:                      9.5    9.24  )-----------( 448      ( 09 Sep 2019 08:16 )             28.5     09-09    126<L>  |  92<L>  |  21  ----------------------------<  130<H>  4.3   |  24  |  0.98    Ca    8.3<L>      09 Sep 2019 22:35  Phos  2.6     09-09  Mg     2.2     09-09      CARDIAC MARKERS ( 07 Sep 2019 21:58 )  36 ng/L / x     / x     / x     / x     / x      CARDIAC MARKERS ( 07 Sep 2019 18:01 )  35 ng/L / x     / x     / 64 U/L / x     / 4.8 ng/mL    Serum Pro-Brain Natriuretic Peptide: 57150 pg/mL (09-07 @ 18:01)      Interpretation of Telemetry: SR: 70-80s

## 2019-09-10 NOTE — PROGRESS NOTE ADULT - PROBLEM SELECTOR PLAN 2
- Findings suggestive of AS given age, new RUSB systolic murmur, worsening exercise intolerance, PND, possible syncopal episodes. - Low suspicion of ischemic etiology given troponin wnl, no chest pain  - ECG: new TWI - no plans for intervention at this time per cards  - BNP: >18K  - TTE pending  - Cardiology following - Findings suggestive of AS given age, new RUSB systolic murmur, worsening exercise intolerance, PND, possible syncopal episodes. - Low suspicion of ischemic etiology given troponin wnl, no chest pain  - ECG: new TWI - no plans for intervention at this time per cards  - BNP: >18K  - TTE: moderate stenosis with poor opening of aortic valve  - Cardiology following: will f/u candidacy for TAVR

## 2019-09-10 NOTE — PROGRESS NOTE ADULT - ASSESSMENT
89 y/o man with h/o prostate ca s/p prostate resection, HTN/HLD, recent hip surgery 8/2018 a/w volume overload w/ hyponatremia.     #1. Hyponatremia  - Na improved to 125, continue to monitor.     #2.  Volume overload  - Await TTE.   - Volume status improving, net neg 2L this admission. F/u Na levels, if stable would administer additional IV lasix 40mg x1 today.    Ric Mccallum PGY4  [phone]  All Cardiology service information can be found 24/7 on [email or web address], password: [email or web address] 89 y/o man with h/o prostate ca s/p prostate resection, HTN/HLD, recent hip surgery 8/2018 a/w volume overload w/ hyponatremia.       #1. Hyponatremia  - Na improved to 125, continue to monitor.     #2.  Volume overload  - Await TTE.   - Volume status improving, net neg 2L this admission. F/u Na levels, if stable would administer additional IV Lasix 40mg x1 today.    Ric Mccallum PGY4  [phone]  All Cardiology service information can be found 24/7 on [email or web address], password: [email or web address]

## 2019-09-10 NOTE — PROGRESS NOTE ADULT - ASSESSMENT
Mr. Dennison is an 89 yo man hx prostate CA (s/p prostate resection), HTN, and HLD sent from [facility] for hyponatremia ([phone])  Nephrology consulted for hyponatemia       RECOMMEND:      - SP  Samsca 15 mg a few days ago and the serum sodium is coming up nicely.    - Ensure TID to increase protein content    -Head > 45 degrees at all times- Cough is less   -Off  salt restriction from the diet as well  -Lasix given yesterday...I dont think he is hypervolemic at present                [employer]  [phone]

## 2019-09-10 NOTE — PROGRESS NOTE ADULT - SUBJECTIVE AND OBJECTIVE BOX
SUBJ:  Smiling. Says she feels good today and yesterday was a good day.  No headache, fevers, chills, nausea or pain.      MEDICATIONS  (STANDING):  aspirin enteric coated 81 milliGRAM(s) Oral daily  atorvastatin 20 milliGRAM(s) Oral at bedtime  enoxaparin Injectable 40 milliGRAM(s) SubCutaneous daily    MEDICATIONS  (PRN):    Vital Signs Last 24 Hrs  T(C): 36.9 (10 Sep 2019 04:22), Max: 36.9 (10 Sep 2019 04:22)  T(F): 98.4 (10 Sep 2019 04:22), Max: 98.4 (10 Sep 2019 04:22)  HR: 89 (10 Sep 2019 04:22) (80 - 95)  BP: 116/64 (10 Sep 2019 04:22) (116/64 - 125/68)  BP(mean): --  RR: 18 (10 Sep 2019 04:22) (18 - 20)  SpO2: 97% (10 Sep 2019 04:22) (95% - 99%)      PHYSICAL EXAM:  · CONSTITUTIONAL: Well-developed, elderly man, accompanied with daughter.  · NECK:	No bruits; no thyromegaly or nodules. No JVD  ·BACK:	+Kyphosis   ·RESPIRATORY:   airway patent; breath sounds equal; good air movement; respirations non-labored; clear to auscultation bilaterally; no chest wall tenderness; no intercostal retractions; no rales,rhonchi or wheeze  · CARDIOVASCULAR	+Grade 3 murmur best heard in RUSB. regular rate and rhythm. No rubs. No JVD.   . GASTROINTESTINAL:  no distention; no masses palpable; bowel sounds normal; no rebound tenderness; no guarding; no rigidity; no organomegaly  · EXTREMITIES: 3+ pitting edema to knees. Warm and well perfused. Symmetric. No cyanosis, clubbing.  · VASCULAR: 	Equal and normal pulses (carotid, femoral, dorsalis pedis)  ·NEUROLOGICAL:   alert and oriented x 3; moves all extremities on command. Hard of hearing    LABS:                        9.5    9.24  )-----------( 448      ( 09 Sep 2019 08:16 )             28.5     09-10    128<L>  |  96  |  20  ----------------------------<  101<H>  3.6   |  22  |  0.71    Ca    7.5<L>      10 Sep 2019 06:43  Phos  2.4     09-10  Mg     2.0     09-10            Creatinine Trend: 0.71<--, 0.98<--, 0.70<--, 0.72<--, 0.72<--, 0.62<--  I&O's Summary    09 Sep 2019 07:01  -  10 Sep 2019 07:00  --------------------------------------------------------  IN: 820 mL / OUT: 1725 mL / NET: -905 mL      BNP  RADIOLOGY & ADDITIONAL STUDIES:    IMPRESSION AND PLAN: SUBJ:  Abdominal pain has improved today  Urinating well. No other complaints    MEDICATIONS  (STANDING):  aspirin enteric coated 81 milliGRAM(s) Oral daily  atorvastatin 20 milliGRAM(s) Oral at bedtime  enoxaparin Injectable 40 milliGRAM(s) SubCutaneous daily    MEDICATIONS  (PRN):    Vital Signs Last 24 Hrs  T(C): 36.9 (10 Sep 2019 04:22), Max: 36.9 (10 Sep 2019 04:22)  T(F): 98.4 (10 Sep 2019 04:22), Max: 98.4 (10 Sep 2019 04:22)  HR: 89 (10 Sep 2019 04:22) (80 - 95)  BP: 116/64 (10 Sep 2019 04:22) (116/64 - 125/68)  BP(mean): --  RR: 18 (10 Sep 2019 04:22) (18 - 20)  SpO2: 97% (10 Sep 2019 04:22) (95% - 99%)      PHYSICAL EXAM:  · CONSTITUTIONAL: Well-developed, elderly man, accompanied with daughter.  · NECK:	No bruits; no thyromegaly or nodules. No JVD  ·BACK:	+Kyphosis   ·RESPIRATORY:   airway patent; breath sounds equal; good air movement; respirations non-labored; clear to auscultation bilaterally; no chest wall tenderness; no intercostal retractions; no rales,rhonchi or wheeze  · CARDIOVASCULAR	+Grade 3 murmur best heard in RUSB. regular rate and rhythm. No rubs. No JVD.   . GASTROINTESTINAL:  no distention; no masses palpable; bowel sounds normal; no rebound tenderness; no guarding; no rigidity; no organomegaly  · EXTREMITIES: 3+ pitting edema to knees. Warm and well perfused. Symmetric. No cyanosis, clubbing.  · VASCULAR: 	Equal and normal pulses (carotid, femoral, dorsalis pedis)  ·NEUROLOGICAL:   alert and oriented x 3; moves all extremities on command. Hard of hearing    LABS:                        9.5    9.24  )-----------( 448      ( 09 Sep 2019 08:16 )             28.5     09-10    128<L>  |  96  |  20  ----------------------------<  101<H>  3.6   |  22  |  0.71    Ca    7.5<L>      10 Sep 2019 06:43  Phos  2.4     09-10  Mg     2.0     09-10            Creatinine Trend: 0.71<--, 0.98<--, 0.70<--, 0.72<--, 0.72<--, 0.62<--  I&O's Summary    09 Sep 2019 07:01  -  10 Sep 2019 07:00  --------------------------------------------------------  IN: 820 mL / OUT: 1725 mL / NET: -905 mL      BNP  RADIOLOGY & ADDITIONAL STUDIES:    IMPRESSION AND PLAN:

## 2019-09-10 NOTE — PROGRESS NOTE ADULT - ASSESSMENT
89M with PMHx of recent L hip surgery fixation, multiple falls, prostate cancer (s/p prostatectomy, on Lupron) and HTN who presents from rehab for management of asymptomatic hypervolemic hypotonic hyponatremia.    Etiology is most concerning for SIADH 2/2 recent stress of surgery vs. CHF (possibly 2/2 AS), given his age, RUSB murmur, worsening exercise intolerance (2 blocks), and recent history  multiple falls.

## 2019-09-10 NOTE — PROGRESS NOTE ADULT - SUBJECTIVE AND OBJECTIVE BOX
NEPHROLOGY-[facility] [phone]        Patient seen and examined in bed.  He was in good spirits and offered no complaints         MEDICATIONS  (STANDING):  aspirin enteric coated 81 milliGRAM(s) Oral daily  atorvastatin 20 milliGRAM(s) Oral at bedtime  docusate sodium 100 milliGRAM(s) Oral daily  enoxaparin Injectable 40 milliGRAM(s) SubCutaneous daily  senna 1 Tablet(s) Oral daily      VITAL:  T(C): , Max: 36.9 (09-10-19 @ 04:22)  T(F): , Max: 98.4 (09-10-19 @ 04:22)  HR: 86 (09-10-19 @ 12:31)  BP: 128/79 (09-10-19 @ 12:31)  BP(mean): --  RR: 18 (09-10-19 @ 12:31)  SpO2: 98% (09-10-19 @ 12:31)  Wt(kg): --    I and O's:    09-09 @ 07:01  -  09-10 @ 07:00  --------------------------------------------------------  IN: 820 mL / OUT: 1725 mL / NET: -905 mL          PHYSICAL EXAM:    Constitutional: NAD  Neck:  No JVD  Respiratory: poor effort   Cardiovascular: S1 and S2  Gastrointestinal: BS+, soft, NT/ND  Extremities: No peripheral edema  Neurological: A/O x 3, no focal deficits  Psychiatric: Normal mood, normal affect  : No Carrizales  Skin: No rashes  Access: Not applicable    LABS:                        8.1    8.64  )-----------( 402      ( 10 Sep 2019 09:29 )             24.8     09-10    128<L>  |  96  |  20  ----------------------------<  101<H>  3.6   |  22  |  0.71    Ca    7.5<L>      10 Sep 2019 06:43  Phos  2.4     09-10  Mg     2.0     09-10            Urine Studies:          RADIOLOGY & ADDITIONAL STUDIES:

## 2019-09-10 NOTE — PROGRESS NOTE ADULT - PROBLEM SELECTOR PLAN 1
- Hyponatremia (116) Hypervolemic, hypotonic, likely sub-acute given previous workup 1 week PTA with similar hypoNa as outpatient.  - Urine studies suggestive of SIADH etiology, most likely 2/2 recent surgery, post procedure pain vs. CHF given worsening exercise intolerance vs. renal dysfunction.   -s/p lasix 20 IV in ED  - Samsca 15mg x 1 (9/8)  - AM cortisol /Uric acid / TSH wnl  - Strict IO, daily weights   - Will start lasix diuresis pending nephrology recommendations  - BMP Q12;  - Workup for HF and renal dysfunction as below. - Hyponatremia (116) Hypervolemic, hypotonic, likely sub-acute given previous workup 1 week PTA with similar hypoNa as outpatient.  - Urine studies suggestive of SIADH etiology, most likely 2/2 recent surgery, post procedure pain vs. CHF given worsening exercise intolerance vs. renal dysfunction.   -s/p lasix 20 IV in ED  - Samsca 15mg x 1 (9/8)  - AM cortisol /Uric acid / TSH wnl  - Strict IO, daily weights   - s/p lasix x 1 with improvement in sodium  - Continue diuresis carefully despite gross hypervolemia given his preload dependence in setting of AS   - BMP Q12;  - Workup for HF and renal dysfunction as below.

## 2019-09-11 ENCOUNTER — TRANSCRIPTION ENCOUNTER (OUTPATIENT)
Age: 84
End: 2019-09-11

## 2019-09-11 LAB
ANION GAP SERPL CALC-SCNC: 13 MMOL/L — SIGNIFICANT CHANGE UP (ref 5–17)
BUN SERPL-MCNC: 24 MG/DL — HIGH (ref 7–23)
CALCIUM SERPL-MCNC: 6.8 MG/DL — LOW (ref 8.4–10.5)
CHLORIDE SERPL-SCNC: 100 MMOL/L — SIGNIFICANT CHANGE UP (ref 96–108)
CO2 SERPL-SCNC: 18 MMOL/L — LOW (ref 22–31)
CREAT SERPL-MCNC: 0.63 MG/DL — SIGNIFICANT CHANGE UP (ref 0.5–1.3)
GLUCOSE SERPL-MCNC: 110 MG/DL — HIGH (ref 70–99)
HCT VFR BLD CALC: 24.8 % — LOW (ref 39–50)
HGB BLD-MCNC: 7.9 G/DL — LOW (ref 13–17)
MCHC RBC-ENTMCNC: 29.2 PG — SIGNIFICANT CHANGE UP (ref 27–34)
MCHC RBC-ENTMCNC: 31.9 GM/DL — LOW (ref 32–36)
MCV RBC AUTO: 91.5 FL — SIGNIFICANT CHANGE UP (ref 80–100)
PLATELET # BLD AUTO: 398 K/UL — SIGNIFICANT CHANGE UP (ref 150–400)
POTASSIUM SERPL-MCNC: 3.7 MMOL/L — SIGNIFICANT CHANGE UP (ref 3.5–5.3)
POTASSIUM SERPL-SCNC: 3.7 MMOL/L — SIGNIFICANT CHANGE UP (ref 3.5–5.3)
RBC # BLD: 2.71 M/UL — LOW (ref 4.2–5.8)
RBC # FLD: 13.7 % — SIGNIFICANT CHANGE UP (ref 10.3–14.5)
SODIUM SERPL-SCNC: 131 MMOL/L — LOW (ref 135–145)
WBC # BLD: 9.92 K/UL — SIGNIFICANT CHANGE UP (ref 3.8–10.5)
WBC # FLD AUTO: 9.92 K/UL — SIGNIFICANT CHANGE UP (ref 3.8–10.5)

## 2019-09-11 PROCEDURE — 99232 SBSQ HOSP IP/OBS MODERATE 35: CPT | Mod: GC

## 2019-09-11 PROCEDURE — 93010 ELECTROCARDIOGRAM REPORT: CPT

## 2019-09-11 RX ORDER — LISINOPRIL 2.5 MG/1
5 TABLET ORAL DAILY
Refills: 0 | Status: DISCONTINUED | OUTPATIENT
Start: 2019-09-11 | End: 2019-09-12

## 2019-09-11 RX ORDER — FUROSEMIDE 40 MG
20 TABLET ORAL ONCE
Refills: 0 | Status: DISCONTINUED | OUTPATIENT
Start: 2019-09-11 | End: 2019-09-11

## 2019-09-11 RX ORDER — FUROSEMIDE 40 MG
20 TABLET ORAL DAILY
Refills: 0 | Status: DISCONTINUED | OUTPATIENT
Start: 2019-09-11 | End: 2019-09-12

## 2019-09-11 RX ORDER — METOPROLOL TARTRATE 50 MG
25 TABLET ORAL DAILY
Refills: 0 | Status: DISCONTINUED | OUTPATIENT
Start: 2019-09-11 | End: 2019-09-12

## 2019-09-11 RX ORDER — ATORVASTATIN CALCIUM 80 MG/1
40 TABLET, FILM COATED ORAL AT BEDTIME
Refills: 0 | Status: DISCONTINUED | OUTPATIENT
Start: 2019-09-11 | End: 2019-09-12

## 2019-09-11 RX ADMIN — Medication 100 MILLIGRAM(S): at 12:53

## 2019-09-11 RX ADMIN — Medication 20 MILLIGRAM(S): at 15:17

## 2019-09-11 RX ADMIN — SENNA PLUS 1 TABLET(S): 8.6 TABLET ORAL at 12:53

## 2019-09-11 RX ADMIN — ENOXAPARIN SODIUM 40 MILLIGRAM(S): 100 INJECTION SUBCUTANEOUS at 12:53

## 2019-09-11 RX ADMIN — Medication 25 MILLIGRAM(S): at 15:17

## 2019-09-11 RX ADMIN — LISINOPRIL 5 MILLIGRAM(S): 2.5 TABLET ORAL at 15:17

## 2019-09-11 RX ADMIN — Medication 81 MILLIGRAM(S): at 12:53

## 2019-09-11 RX ADMIN — ATORVASTATIN CALCIUM 40 MILLIGRAM(S): 80 TABLET, FILM COATED ORAL at 21:53

## 2019-09-11 NOTE — DISCHARGE NOTE PROVIDER - CARE PROVIDER_API CALL
Geovani Puri)  Internal Medicine  [address], [address]  [place] [address]  Phone: [phone]  Fax: [phone]  Follow Up Time: 1-3 days

## 2019-09-11 NOTE — PROGRESS NOTE ADULT - PROBLEM SELECTOR PLAN 2
- likely 2/2 ICM w/ moderate AS  - c/w ASA   - increase Lipitor to 40mg.   -start lisinopril 5mg   - Start metoprolol 25mg XL  - ECG: new TWI - no plans for intervention at this time per cards  - BNP: >18K  - TTE: global LV dysfunction.  moderate stenosis with poor opening of aortic valve  - Cardiology following

## 2019-09-11 NOTE — DISCHARGE NOTE PROVIDER - NSDCFUADDAPPT_GEN_ALL_CORE_FT
1. Please follow up with cardiology within 1 week of discharge.  2. Please follow up with your primary care doctor within 1 week of discharge.

## 2019-09-11 NOTE — PROGRESS NOTE ADULT - ASSESSMENT
89M with PMHx of recent L hip surgery fixation, multiple falls, prostate cancer (s/p prostatectomy, on Lupron) and HTN who presents from rehab for management of asymptomatic hypervolemic hypotonic hyponatremia.    Etiology is most concerning for SIADH 2/2 recent stress of surgery vs. CHF 2/2 LV dysfunction and AS)

## 2019-09-11 NOTE — DISCHARGE NOTE PROVIDER - NSDCFUSCHEDAPPT_GEN_ALL_CORE_FT
MANOLO WALDRON ; 11/22/2019 ; [facility] [address]  MANOLO WALDRON ; 11/22/2019 ; [facility] [address] MANOLO WALDRON ; 11/22/2019 ; [facility] [address]  MANOLO WALDRON ; 11/22/2019 ; [facility] [address] MANOLO WALDRON ; 11/22/2019 ; [facility] [address] [facility]  MANOLO WALDRON ; 11/22/2019 ; [facility] [address] MANOLO WALDRON ; 11/22/2019 ; [facility] [address] [facility]  MANOLO WALDRON ; 11/22/2019 ; [facility] [address] [facility] MANOLO WALDRON ; 11/22/2019 ; [facility] [address]  MANOLO WALDRON ; 11/22/2019 ; [facility] [address] MANOLO WALDRON ; 11/22/2019 ; [facility] [address] [facility]  MANOLO WALDRON ; 11/22/2019 ; [facility] [address] [facility] [address] [facility]

## 2019-09-11 NOTE — DISCHARGE NOTE PROVIDER - HOSPITAL COURSE
89M with PMHx of recent L hip surgery fixation, multiple falls, prostate cancer (s/p prostatectomy, on Lupron) who presents from rehab for management of asymptomatic hypervolemic hypotonic hyponatremia (Na on admission 114) and heart failure. Nephrology was consulted and he was given tolvaptan 15mg on the day of his admission and then gently diuresed with lasix thereafter. 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 A TTE demonstrated global left ventricular dysfunction and moderate aortic stenosis; suggestive of ischemic heart disease; and he was started on goal directed medical therapy for heart failure with reduced ejection fraction with lisinopril and metoprolol. He will be discharged home for continued diuresis and further workup for his heart failure.  89M with PMHx of recent L hip surgery fixation, multiple falls, prostate cancer (s/p prostatectomy, on Lupron) who presents from rehab for management of asymptomatic hypervolemic hypotonic hyponatremia (Na on admission 114) and heart failure. Nephrology was consulted and he was given tolvaptan 15mg on the day of his admission and then gently diuresed with lasix thereafter. 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 A TTE demonstrated global left ventricular dysfunction and moderate aortic stenosis; suggestive of ischemic heart disease; and he was started on goal directed medical therapy for heart failure with reduced ejection fraction with lisinopril and metoprolol. With diuresis he has achieved euvolemia and his sodium has normalized. He will be discharged home for continued diuresis and further workup for his heart failure.  89M with PMHx of recent L hip surgery fixation, multiple falls, prostate cancer (s/p prostatectomy, on Lupron) who presents from rehab for management of asymptomatic hypervolemic hypotonic hyponatremia (Na on admission 114) and heart failure. Nephrology was consulted and he was given tolvaptan 15mg on the day of his admission and then gently diuresed with lasix thereafter. 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 A TTE demonstrated global left ventricular dysfunction and moderate aortic stenosis; suggestive of ischemic heart disease; and he was started on goal directed medical therapy for heart failure with reduced ejection fraction with lisinopril and metoprolol which he tolerated without any adverse effects.. With diuresis he has achieved euvolemia and his sodium has normalized. He will be discharged home for continued diuresis and further workup for his heart failure.  89M with PMHx of recent L hip surgery fixation, multiple falls, prostate cancer (s/p prostatectomy, on Lupron) who presents from rehab for management of asymptomatic hypotonic hyponatremia (Na on admission 114). On admission, patient appeared to be hypervolemic, however his urine and serum studies were more suggestive of SIADH. He was given tolvaptan by nephrology, which improved his hyponatremia from 114 to 125 over 36 hours. He was then given gentle diuresis for his volume overload status, with further improvement in his sodium.         Patient's SIADH was likely secondary to worsening pain from his recent hip fracture, and a AM cortisol and TSH found no evidence for adrenal insufficiency or hyperthyroid etiology of his hyponatremia.  Because of his gross hypervolemia, BNP >18k and history of progressive dyspnea on exertion, a TTE was done which demonstrated moderate segmental wall dysfunction suggestive of ischemic heart disease. Cardiology was consulted who recommended medical management, and patient was started on lisinopril and metoprolol. With diuresis he has achieved euvolemia and his sodium has normalized. He will be discharged home for continued diuresis and further workup for his heart failure.  89M with PMHx of recent L hip surgery fixation, multiple falls, prostate cancer (s/p prostatectomy, on Lupron) who presents from rehab for management of asymptomatic hypotonic hyponatremia (Na on admission 114). On admission, patient appeared to be hypervolemic, however his urine and serum studies were more suggestive of SIADH. He was given tolvaptan by nephrology, which improved his hyponatremia from 114 to 125 over 36 hours. He was then given gentle diuresis for his volume overload status, with further improvement in his sodium.         Patient's SIADH was likely secondary to worsening pain from his recent hip fracture, and a AM cortisol and TSH found no evidence for adrenal insufficiency or hyperthyroid etiology of his hyponatremia.  Because of his gross hypervolemia, BNP >18k and history of progressive dyspnea on exertion, a TTE was done which demonstrated moderate segmental wall dysfunction suggestive of ischemic heart disease. Cardiology was consulted who recommended medical management, and patient was started on lisinopril and metoprolol. With diuresis he has achieved euvolemia and his sodium has normalized. He will be discharged home for continued diuresis and further workup for his newly diagnosed systolic heart failure.   Of note patient was not in acute decompensated heart failure.

## 2019-09-11 NOTE — PROVIDER CONTACT NOTE (OTHER) - ASSESSMENT
Pt in NAD, vs per flowsheet. Pt was asleep at time of ectopy, converted back to NSR without intervention. Denies CP, palpitations, SOB, lightheadedness.

## 2019-09-11 NOTE — PROGRESS NOTE ADULT - ASSESSMENT
89 y/o man with h/o prostate ca s/p prostate resection, HTN/HLD, recent hip surgery 8/2018 a/w volume overload w/ TTE revealing new cardiomyopathy    #1. Cardiomyopathy  -Suspect component of ischemic disease, but given pt's elderly age and comorbidities, will not pursue aggressive w/u and manage conservatively. c/w ASA and increase Lipitor to 40mg. Can start Lisinopril 5mg and Metoprolol 25mg XL and uptitrate as tolerated.   -Volume status improving, would c/w IV lasix through today and reassess tomorrow.     #2. Hyponatremia  - Na continues to improve, continue to monitor.       Ric Mccallum PGY4  [phone]  All Cardiology service information can be found 24/7 on [email or web address], password: [email or web address] 89 y/o man with h/o prostate ca s/p prostate resection, HTN/HLD, recent hip surgery 8/2018.  A/W volume overload w/ TTE revealing moderate segmental dysfunction, suggesting CAD as well as moderate A.S.      REC:  #1. Cardiomyopathy  -Suspect component of ischemic disease, but given pt's elderly age and comorbidities, will not pursue aggressive w/u.   Would manage conservatively - c/w ASA and increase Lipitor to 40mg. Can start lisinopril 5mg and metoprolol 25mg XL and uptitrate as tolerated.   -Volume status improving, would c/w IV Lasix through today and reassess tomorrow.     #2. Hyponatremia  - Na continues to improve, continue to monitor.     #3.  Moderate A.S.  - no intervention required      Ric Mccallum PGY4  [phone]  All Cardiology service information can be found 24/7 on [email or web address], password: [email or web address]    Norris Salgado M.D.  Cardiology Attending, Consult Service  [phone]

## 2019-09-11 NOTE — PROGRESS NOTE ADULT - SUBJECTIVE AND OBJECTIVE BOX
NEPHROLOGY-[facility] [phone]        Patient seen and examined in bed.  He was in good spirits         MEDICATIONS  (STANDING):  aspirin enteric coated 81 milliGRAM(s) Oral daily  atorvastatin 20 milliGRAM(s) Oral at bedtime  docusate sodium 100 milliGRAM(s) Oral daily  enoxaparin Injectable 40 milliGRAM(s) SubCutaneous daily  senna 1 Tablet(s) Oral daily      VITAL:  T(C): , Max: 36.8 (09-10-19 @ 12:31)  T(F): , Max: 98.3 (09-10-19 @ 20:22)  HR: 92 (09-11-19 @ 08:30)  BP: 118/76 (09-11-19 @ 08:30)  BP(mean): --  RR: 18 (09-11-19 @ 08:30)  SpO2: 97% (09-11-19 @ 08:30)  Wt(kg): --    I and O's:    09-10 @ 07:01  -  09-11 @ 07:00  --------------------------------------------------------  IN: 120 mL / OUT: 1100 mL / NET: -980 mL          PHYSICAL EXAM:    Constitutional: NAD  Neck:  No JVD  Respiratory: CTAB/L  Cardiovascular: S1 and S2  Gastrointestinal: BS+, soft, NT/ND  Extremities: No peripheral edema  Neurological: A/O x 3, no focal deficits  Psychiatric: Normal mood, normal affect  : No Carrizales  Skin: No rashes  Access: Not applicable    LABS:                        7.9    9.92  )-----------( 398      ( 11 Sep 2019 08:31 )             24.8     09-11    131<L>  |  100  |  24<H>  ----------------------------<  110<H>  3.7   |  18<L>  |  0.63    Ca    6.8<L>      11 Sep 2019 06:42  Phos  2.4     09-10  Mg     2.0     09-10            Urine Studies:          RADIOLOGY & ADDITIONAL STUDIES:    < from: TTE with Doppler (w/Cont) (09.10.19 @ 07:28) >    Patient name: MANOLO WALDRON  YOB: 1929   Age: 89 (M)   MR#: [id number]  Study Date: 9/10/2019  Location: [address] [place], [place] [id number]: [facility]  Study quality: Technically difficult/Limited  Referring Physician: Danica Pelaez MD  Blood Pressure: 116/64 mmHg  Height: 173 cm  Weight: 68 kg  BSA: 1.8 m2  ------------------------------------------------------------------------  PROCEDURE: Transthoracic echocardiogram with 2-D, M-Mode  and complete spectral and color flow Doppler. Verbal  consent was obtained for injection of  Ultrasonic Enhancing  Agent following a discussion of risks and benefits.  Following intravenous injection of Ultrasonic Enhancing  Agent , harmonic imaging was performed.  INDICATION: Chronic ischemic heart disease, unspecified  (I25.9)  ------------------------------------------------------------------------  Dimensions:    Normal Values:  LA:     3.2    2.0 - 4.0 cm  Ao:     3.5    2.0 - 3.8 cm  SEPTUM: 1.0    0.6 - 1.2 cm  PWT:    0.9    0.6 - 1.1 cm  LVIDd:  4.8    3.0 - 5.6 cm  LVIDs:  3.3    1.8 - 4.0 cm  Derived variables:  LVMI: 88 g/m2  RWT: 0.37  Fractional short: 31 %  EF (Visual Estimate): 40 %  Doppler Peak Velocity (m/sec): AoV=2.7  ------------------------------------------------------------------------  Observations:  Mitral Valve: Normal mitral valve.  Aortic Valve/Aorta: Calcified aortic valve with decreased  opening. Peak transaortic valve gradient equals 29 mm Hg,  mean transaortic valve gradient equals 19 mm Hg, estimated  aortic valve area equals 1.2 sqcm (by continuity equation),  aortic valve velocity time integral equals 54 cm,  consistent with moderate aortic stenosis. Minimal aortic  regurgitation.  Peak left ventricular outflow tract  gradient equals 4 mm Hg, mean gradient is equal to 2 mm Hg,  LVOT velocity time integral equals 15 cm.  Aortic Root: 3.5 cm.  LVOT diameter: 2.3 cm.  Left Atrium: Normal left atrium.  Left Ventricle: Moderagte segmental left ventricular  systolic dysfunction.  Hypoknesis of the mid and apical  septum, anterioor wall and a;pex, distal inferior wall.  Endocardial visualization enhanced with intravenous  injection of Ultrasonic Enhancing Agent (Definity). No left  ventricular thrombus. Normal left ventricular internal  dimensions and wall thicknesses. Mild diastolic dysfunction  (Stage I).  Right Heart: Right atrium not well visualized. The right  ventricle is not well visualized; grossly normal right  ventricular systolic function. Normal tricuspid valve.  Normal pulmonic valve.  Pericardium/Pleura: Normal pericardium with no pericardial  effusion.  Hemodynamic: Estimated right atrial pressure is 8 mm Hg.  ------------------------------------------------------------------------  Conclusions:  1. Calcified aortic valve with decreased opening. Peak  transaortic valve gradient equals 29 mm Hg, mean  transaortic valve gradient equals 19 mm Hg, estimated  aortic valve area equals 1.2 sqcm (by continuity equation),  aortic valve velocity time integral equals 54 cm,  consistent with moderate aortic stenosis.  2. Normal left ventricular internal dimensions and wall  thicknesses.  3. Moderagte segmental left ventricular systolic  dysfunction.  Hypoknesis of the mid and apical septum,  anterioor wall and a;pex, distal inferior wall.  Endocardial visualization enhanced with intravenous  injection of Ultrasonic Enhancing Agent (Definity). No left  ventricular thrombus.  4. Mild diastolic dysfunction (Stage I).  *** No previous Echo exam.  ------------------------------------------------------------------------  Confirmed on  9/10/2019 - 11:34:51 by Ward Garcia M.D.  ------------------------------------------------------------------------    < end of copied text >

## 2019-09-11 NOTE — PROGRESS NOTE ADULT - SUBJECTIVE AND OBJECTIVE BOX
SUBJ:  NAEO. Ate well yesterday.    MEDICATIONS  (STANDING):  aspirin enteric coated 81 milliGRAM(s) Oral daily  atorvastatin 20 milliGRAM(s) Oral at bedtime  docusate sodium 100 milliGRAM(s) Oral daily  enoxaparin Injectable 40 milliGRAM(s) SubCutaneous daily  senna 1 Tablet(s) Oral daily    MEDICATIONS  (PRN):            Vital Signs Last 24 Hrs  T(C): 36.7 (11 Sep 2019 08:30), Max: 36.8 (10 Sep 2019 12:31)  T(F): 98 (11 Sep 2019 08:30), Max: 98.3 (10 Sep 2019 20:22)  HR: 92 (11 Sep 2019 08:30) (86 - 96)  BP: 118/76 (11 Sep 2019 08:30) (118/76 - 128/79)  BP(mean): --  RR: 18 (11 Sep 2019 08:30) (18 - 19)  SpO2: 97% (11 Sep 2019 08:30) (93% - 98%)    PHYSICAL EXAM:  · CONSTITUTIONAL: Well-developed, elderly man, accompanied with daughter.  · NECK:	No bruits; no thyromegaly or nodules. No JVD  ·BACK:	+Kyphosis   ·RESPIRATORY:   airway patent; breath sounds equal; good air movement; respirations non-labored; clear to auscultation bilaterally; no chest wall tenderness; no intercostal retractions; no rales,rhonchi or wheeze  · CARDIOVASCULAR	+Grade 3 murmur best heard in RUSB. regular rate and rhythm. No rubs. No JVD.   . GASTROINTESTINAL:  no distention; no masses palpable; bowel sounds normal; no rebound tenderness; no guarding; no rigidity; no organomegaly  · EXTREMITIES: 2+ pitting edema to knees. Warm and well perfused. Symmetric. No cyanosis, clubbing.  · VASCULAR: 	Equal and normal pulses (carotid, femoral, dorsalis pedis)  ·NEUROLOGICAL:   alert and oriented x 3; moves all extremities on command. Hard of hearing  LABS:                        7.9    9.92  )-----------( 398      ( 11 Sep 2019 08:31 )             24.8     09-11    131<L>  |  100  |  24<H>  ----------------------------<  110<H>  3.7   |  18<L>  |  0.63    Ca    6.8<L>      11 Sep 2019 06:42  Phos  2.4     09-10  Mg     2.0     09-10            Creatinine Trend: 0.63<--, 0.71<--, 0.98<--, 0.70<--, 0.72<--, 0.72<--  I&O's Summary    10 Sep 2019 07:01  -  11 Sep 2019 07:00  --------------------------------------------------------  IN: 120 mL / OUT: 1100 mL / NET: -980 mL      BNP  RADIOLOGY & ADDITIONAL STUDIES:    IMPRESSION AND PLAN:

## 2019-09-11 NOTE — PROGRESS NOTE ADULT - NSHPATTENDINGPLANDISCUSS_GEN_ALL_CORE
Daughter
Plan discussed with cardiology fellow, Dr. Mccallum; patient seen and examined.       I was physically present for the key portions of the evaluation and management (E/M) service provided.    I agree with the above history, physical, and plan which I have reviewed and edited where appropriate.

## 2019-09-11 NOTE — PROGRESS NOTE ADULT - SUBJECTIVE AND OBJECTIVE BOX
Ric Mccallum  [phone]  All Cardiology service information can be found 24/7 on [email or web address], password: [email or web address]    Patient seen and examined at bedside.    Overnight Events: No events overnight. Pt denies any chest pain, sob, or palpitations.     CONSTITUTIONAL:  No fevers or chills  HEENT: No rhinorrhea   SKIN:  No rash or itching.  CARDIOVASCULAR:  No chest pain, chest pressure or chest discomfort.   RESPIRATORY:  No shortness of breath, cough or sputum.  GASTROINTESTINAL:  No nausea, vomiting or diarrhea. No abdominal pain.   GENITOURINARY:  Denies hematuria, dysuria.   NEUROLOGICAL:  No headache, dizziness, syncope.   MUSCULOSKELETAL:  No muscle, back pain, joint pain or stiffness.  HEMATOLOGIC:  No bleeding or bruising.         Current Meds:  aspirin enteric coated 81 milliGRAM(s) Oral daily  atorvastatin 20 milliGRAM(s) Oral at bedtime  docusate sodium 100 milliGRAM(s) Oral daily  enoxaparin Injectable 40 milliGRAM(s) SubCutaneous daily  senna 1 Tablet(s) Oral daily      Vitals:  T(F): 98 (09-11), Max: 98.3 (09-10)  HR: 92 (09-11) (86 - 96)  BP: 118/76 (09-11) (118/76 - 128/79)  RR: 18 (09-11)  SpO2: 97% (09-11)  I&O's Summary    10 Sep 2019 07:01  -  11 Sep 2019 07:00  --------------------------------------------------------  IN: 120 mL / OUT: 1100 mL / NET: -980 mL        Physical Exam:  Appearance: No acute distress; well appearing  Eyes: PERRL, EOMI, pink conjunctiva  HEENT: Normal oral mucosa  Cardiovascular: RRR, S1, S2, 2+ b/l LE edema   Respiratory: Clear to auscultation bilaterally  Gastrointestinal: soft, non-tender, non-distended with normal bowel sounds  Musculoskeletal: No clubbing; no joint deformity   Neurologic: Non-focal  Lymphatic: No lymphadenopathy  Psychiatry: mood & affect appropriate  Skin: No rashes, ecchymoses, or cyanosis                          7.9    9.92  )-----------( 398      ( 11 Sep 2019 08:31 )             24.8     09-11    131<L>  |  100  |  24<H>  ----------------------------<  110<H>  3.7   |  18<L>  |  0.63    Ca    6.8<L>      11 Sep 2019 06:42  Phos  2.4     09-10  Mg     2.0     09-10        CARDIAC MARKERS ( 07 Sep 2019 21:58 )  36 ng/L / x     / x     / x     / x     / x      CARDIAC MARKERS ( 07 Sep 2019 18:01 )  35 ng/L / x     / x     / 64 U/L / x     / 4.8 ng/mL      Serum Pro-Brain Natriuretic Peptide: 83914 pg/mL (09-07 @ 18:01)          New ECG(s): Personally reviewed    Echo:    < from: TTE with Doppler (w/Cont) (09.10.19 @ 07:28) >  1. Calcified aortic valve with decreased opening. Peak  transaortic valve gradient equals 29 mm Hg, mean  transaortic valve gradient equals 19 mm Hg, estimated  aortic valve area equals 1.2 sqcm (by continuity equation),  aortic valve velocity time integral equals 54 cm,  consistent with moderate aortic stenosis.  2. Normal left ventricular internal dimensions and wall  thicknesses.  3. Moderagte segmental left ventricular systolic  dysfunction.  Hypoknesis of the mid and apical septum,  anterioor wall and a;pex, distal inferior wall.  Endocardial visualization enhanced with intravenous  injection of Ultrasonic Enhancing Agent (Definity). No left  ventricular thrombus.  4. Mild diastolic dysfunction (Stage I).  *** No previous Echo exam.    < end of copied text >    Interpretation of Telemetry: SR 90-100s, NSVT Ric Mccallum  [phone]  All Cardiology service information can be found 24/7 on [email or web address], password: [email or web address]        Patient seen and examined at bedside.    Overnight Events: No events overnight. Pt denies any chest pain, sob, or palpitations.     CONSTITUTIONAL:  No fevers or chills  HEENT: No rhinorrhea   SKIN:  No rash or itching.  CARDIOVASCULAR:  No chest pain, chest pressure or chest discomfort.   RESPIRATORY:  No shortness of breath, cough or sputum.  GASTROINTESTINAL:  No nausea, vomiting or diarrhea. No abdominal pain.   GENITOURINARY:  Denies hematuria, dysuria.   NEUROLOGICAL:  No headache, dizziness, syncope.   MUSCULOSKELETAL:  No muscle, back pain, joint pain or stiffness.  HEMATOLOGIC:  No bleeding or bruising.         Current Meds:  aspirin enteric coated 81 milliGRAM(s) Oral daily  atorvastatin 20 milliGRAM(s) Oral at bedtime  docusate sodium 100 milliGRAM(s) Oral daily  enoxaparin Injectable 40 milliGRAM(s) SubCutaneous daily  senna 1 Tablet(s) Oral daily      Vitals:  T(F): 98 (09-11), Max: 98.3 (09-10)  HR: 92 (09-11) (86 - 96)  BP: 118/76 (09-11) (118/76 - 128/79)  RR: 18 (09-11)  SpO2: 97% (09-11)    Physical Exam:  Appearance: No acute distress; well appearing  Eyes: PERRL, EOMI, pink conjunctiva  HEENT: Normal oral mucosa  Cardiovascular: RRR, S1, S2, 2+ b/l LE edema   Respiratory: Clear to auscultation bilaterally  Gastrointestinal: soft, non-tender, non-distended with normal bowel sounds  Musculoskeletal: No clubbing; no joint deformity   Neurologic: Non-focal  Lymphatic: No lymphadenopathy  Psychiatry: mood & affect appropriate  Skin: No rashes, ecchymoses, or cyanosis      I&O's Summary    10 Sep 2019 07:01  -  11 Sep 2019 07:00  --------------------------------------------------------  IN: 120 mL / OUT: 1100 mL / NET: -980 mL      LABS:                        7.9    9.92  )-----------( 398      ( 11 Sep 2019 08:31 )             24.8     09-11  131<L>  |  100  |  24<H>  ----------------------------<  110<H>  3.7   |  18<L>  |  0.63    Ca    6.8<L>      11 Sep 2019 06:42  Phos  2.4     09-10  Mg     2.0     09-10      TTE with Doppler (w/Cont) (09.10.19 @ 07:28) >  1. Calcified aortic valve with decreased opening. Peak transaortic valve gradient equals 29 mm Hg, mean transaortic valve gradient equals 19 mm Hg, estimated aortic valve area equals 1.2 sqcm (by continuity equation), aortic valve velocity time integral equals 54 cm, consistent with moderate aortic stenosis.  2. Normal left ventricular internal dimensions and wall thicknesses.  3. Moderate segmental left ventricular systolic dysfunction.  Hypokinesis of the mid and apical septum, anterior wall and apex, distal inferior wall. Endocardial visualization enhanced with intravenous injection of Ultrasonic Enhancing Agent (Definity). No left ventricular thrombus.  4. Mild diastolic dysfunction (Stage I).        Interpretation of Telemetry: SR 90-100s, NSVT

## 2019-09-11 NOTE — DISCHARGE NOTE PROVIDER - NSDCCPCAREPLAN_GEN_ALL_CORE_FT
PRINCIPAL DISCHARGE DIAGNOSIS  Diagnosis: Hyponatremia  Assessment and Plan of Treatment: You presented to the hospital with a low sodium in your blood tests, which was most likely caused by fluid overload. We provided you with medicines to help you urinate the fluid off your body and normalize your sodium level. We also did several tests to work up the cause of your fluid overload; where we found that your heart is not pumping as effectively, which is most likely caused by diseased blood vessels to the heart.   We are sending you to rehab to continue your treatment with medicine to help you urinate more frequently and to manage your new diagnosis of heart failure.      SECONDARY DISCHARGE DIAGNOSES  Diagnosis: Heart failure  Assessment and Plan of Treatment: PRINCIPAL DISCHARGE DIAGNOSIS  Diagnosis: Hyponatremia  Assessment and Plan of Treatment: You presented to the hospital with a low sodium in your blood tests, which was most likely caused by fluid overload. We provided you with medicines to help you urinate the fluid off your body and normalize your sodium level. We also did several tests to work up the cause of your fluid overload; where we found that your heart is not pumping as effectively, which is most likely caused by diseased blood vessels to the heart.   We are sending you to rehab to continue your treatment with medicine to help you urinate more frequently and to manage your new diagnosis of heart failure.      SECONDARY DISCHARGE DIAGNOSES  Diagnosis: Heart failure  Assessment and Plan of Treatment: You were found to have some abnormalities on your echocardigraph. You may have underlying coronary artery disease. Per cardiology recommendations, you were started on 2 new medications, lisinopril and metoprolol, and your cholesterol medication was increased.

## 2019-09-11 NOTE — PROGRESS NOTE ADULT - ASSESSMENT
Mr. Dennison is an 89 yo man hx prostate CA (s/p prostate resection), HTN, and HLD sent from [facility] for hyponatremia (114)  Nephrology consulted for hyponatemia  Moderate AS        RECOMMEND:      - SP  Samsca 15 mg a few days ago and the serum sodium is coming up nicely.    - Ensure TID to increase protein content    -Head > 45 degrees at all times- Cough is less   -Off  salt restriction from the diet as well  - I heve no objection to standing lasix but at present he is euvolemic.  Maybe lasix qod dosing as outpt?    -Physical therapy and back to rehab                 [employer]  [phone]

## 2019-09-11 NOTE — PROGRESS NOTE ADULT - PROBLEM SELECTOR PLAN 1
- Hyponatremia (116) Hypervolemic, hypotonic, likely sub-acute given previous workup 1 week PTA with similar hypoNa as outpatient.  - Urine studies suggestive of SIADH etiology, most likely 2/2 recent surgery, post procedure pain vs. CHF given worsening exercise intolerance vs. renal dysfunction.   -s/p lasix 20 IV in ED  - Samsca 15mg x 1 (9/8)  - AM cortisol /Uric acid / TSH wnl  - Strict IO, daily weights   - Continue diuresis carefully despite gross hypervolemia given his preload dependence in setting of AS   - BMP Q12;  - Workup for HF and renal dysfunction as below.

## 2019-09-12 ENCOUNTER — TRANSCRIPTION ENCOUNTER (OUTPATIENT)
Age: 84
End: 2019-09-12

## 2019-09-12 VITALS
RESPIRATION RATE: 18 BRPM | SYSTOLIC BLOOD PRESSURE: 104 MMHG | DIASTOLIC BLOOD PRESSURE: 69 MMHG | HEART RATE: 91 BPM | TEMPERATURE: 98 F | OXYGEN SATURATION: 96 %

## 2019-09-12 LAB
ANION GAP SERPL CALC-SCNC: 13 MMOL/L — SIGNIFICANT CHANGE UP (ref 5–17)
BASOPHILS # BLD AUTO: 0.03 K/UL — SIGNIFICANT CHANGE UP (ref 0–0.2)
BASOPHILS NFR BLD AUTO: 0.2 % — SIGNIFICANT CHANGE UP (ref 0–2)
BUN SERPL-MCNC: 30 MG/DL — HIGH (ref 7–23)
CALCIUM SERPL-MCNC: 8.7 MG/DL — SIGNIFICANT CHANGE UP (ref 8.4–10.5)
CHLORIDE SERPL-SCNC: 92 MMOL/L — LOW (ref 96–108)
CO2 SERPL-SCNC: 24 MMOL/L — SIGNIFICANT CHANGE UP (ref 22–31)
CREAT SERPL-MCNC: 0.71 MG/DL — SIGNIFICANT CHANGE UP (ref 0.5–1.3)
EOSINOPHIL # BLD AUTO: 0.09 K/UL — SIGNIFICANT CHANGE UP (ref 0–0.5)
EOSINOPHIL NFR BLD AUTO: 0.7 % — SIGNIFICANT CHANGE UP (ref 0–6)
GLUCOSE SERPL-MCNC: 148 MG/DL — HIGH (ref 70–99)
HCT VFR BLD CALC: 27.7 % — LOW (ref 39–50)
HGB BLD-MCNC: 9 G/DL — LOW (ref 13–17)
IMM GRANULOCYTES NFR BLD AUTO: 0.5 % — SIGNIFICANT CHANGE UP (ref 0–1.5)
LYMPHOCYTES # BLD AUTO: 0.61 K/UL — LOW (ref 1–3.3)
LYMPHOCYTES # BLD AUTO: 4.6 % — LOW (ref 13–44)
MAGNESIUM SERPL-MCNC: 2 MG/DL — SIGNIFICANT CHANGE UP (ref 1.6–2.6)
MCHC RBC-ENTMCNC: 28.9 PG — SIGNIFICANT CHANGE UP (ref 27–34)
MCHC RBC-ENTMCNC: 32.5 GM/DL — SIGNIFICANT CHANGE UP (ref 32–36)
MCV RBC AUTO: 89.1 FL — SIGNIFICANT CHANGE UP (ref 80–100)
MONOCYTES # BLD AUTO: 1.13 K/UL — HIGH (ref 0–0.9)
MONOCYTES NFR BLD AUTO: 8.6 % — SIGNIFICANT CHANGE UP (ref 2–14)
NEUTROPHILS # BLD AUTO: 11.21 K/UL — HIGH (ref 1.8–7.4)
NEUTROPHILS NFR BLD AUTO: 85.4 % — HIGH (ref 43–77)
PHOSPHATE SERPL-MCNC: 3.2 MG/DL — SIGNIFICANT CHANGE UP (ref 2.5–4.5)
PLATELET # BLD AUTO: 418 K/UL — HIGH (ref 150–400)
POTASSIUM SERPL-MCNC: 4.3 MMOL/L — SIGNIFICANT CHANGE UP (ref 3.5–5.3)
POTASSIUM SERPL-SCNC: 4.3 MMOL/L — SIGNIFICANT CHANGE UP (ref 3.5–5.3)
RBC # BLD: 3.11 M/UL — LOW (ref 4.2–5.8)
RBC # FLD: 13.9 % — SIGNIFICANT CHANGE UP (ref 10.3–14.5)
SODIUM SERPL-SCNC: 129 MMOL/L — LOW (ref 135–145)
WBC # BLD: 13.44 K/UL — HIGH (ref 3.8–10.5)
WBC # FLD AUTO: 13.44 K/UL — HIGH (ref 3.8–10.5)

## 2019-09-12 PROCEDURE — 99239 HOSP IP/OBS DSCHRG MGMT >30: CPT

## 2019-09-12 RX ORDER — LISINOPRIL 2.5 MG/1
1 TABLET ORAL
Qty: 0 | Refills: 0 | DISCHARGE
Start: 2019-09-12

## 2019-09-12 RX ORDER — DOCUSATE SODIUM 100 MG
1 CAPSULE ORAL
Qty: 0 | Refills: 0 | DISCHARGE
Start: 2019-09-12

## 2019-09-12 RX ORDER — SENNA PLUS 8.6 MG/1
1 TABLET ORAL
Qty: 0 | Refills: 0 | DISCHARGE
Start: 2019-09-12

## 2019-09-12 RX ORDER — METOPROLOL TARTRATE 50 MG
1 TABLET ORAL
Qty: 0 | Refills: 0 | DISCHARGE
Start: 2019-09-12

## 2019-09-12 RX ORDER — ATORVASTATIN CALCIUM 80 MG/1
1 TABLET, FILM COATED ORAL
Qty: 0 | Refills: 0 | DISCHARGE
Start: 2019-09-12

## 2019-09-12 RX ORDER — FUROSEMIDE 40 MG
1 TABLET ORAL
Qty: 0 | Refills: 0 | DISCHARGE
Start: 2019-09-12

## 2019-09-12 RX ADMIN — SENNA PLUS 1 TABLET(S): 8.6 TABLET ORAL at 11:23

## 2019-09-12 RX ADMIN — LISINOPRIL 5 MILLIGRAM(S): 2.5 TABLET ORAL at 05:03

## 2019-09-12 RX ADMIN — Medication 100 MILLIGRAM(S): at 11:23

## 2019-09-12 RX ADMIN — ENOXAPARIN SODIUM 40 MILLIGRAM(S): 100 INJECTION SUBCUTANEOUS at 11:23

## 2019-09-12 RX ADMIN — Medication 25 MILLIGRAM(S): at 05:03

## 2019-09-12 RX ADMIN — Medication 81 MILLIGRAM(S): at 11:23

## 2019-09-12 RX ADMIN — Medication 20 MILLIGRAM(S): at 05:03

## 2019-09-12 NOTE — DISCHARGE NOTE NURSING/CASE MANAGEMENT/SOCIAL WORK - PATIENT PORTAL LINK FT
You can access the FollowMyHealth Patient Portal offered by [facility] by registering at the following website: [email or web address]. By joining [facility]’s FollowMyHealth portal, you will also be able to view your health information using other applications (apps) compatible with our system.

## 2019-09-12 NOTE — PROGRESS NOTE ADULT - PROVIDER SPECIALTY LIST ADULT
Cardiology
Cardiology
Internal Medicine
Nephrology
Cardiology

## 2019-09-12 NOTE — PROGRESS NOTE ADULT - SUBJECTIVE AND OBJECTIVE BOX
SUBJ:  NAEO denies chest pain, shortness of breath, orthopnea, cough, nausea, vomiting, diarrhea, fevers, chills, or  headache.    MEDICATIONS  (STANDING):  aspirin enteric coated 81 milliGRAM(s) Oral daily  atorvastatin 40 milliGRAM(s) Oral at bedtime  docusate sodium 100 milliGRAM(s) Oral daily  enoxaparin Injectable 40 milliGRAM(s) SubCutaneous daily  furosemide    Tablet 20 milliGRAM(s) Oral daily  lisinopril 5 milliGRAM(s) Oral daily  metoprolol succinate ER 25 milliGRAM(s) Oral daily  senna 1 Tablet(s) Oral daily    MEDICATIONS  (PRN):            Vital Signs Last 24 Hrs  T(C): 36.7 (12 Sep 2019 11:40), Max: 37 (12 Sep 2019 00:02)  T(F): 98 (12 Sep 2019 11:40), Max: 98.6 (12 Sep 2019 00:02)  HR: 91 (12 Sep 2019 11:40) (90 - 96)  BP: 104/69 (12 Sep 2019 11:40) (104/69 - 137/80)  BP(mean): --  RR: 18 (12 Sep 2019 11:40) (18 - 18)  SpO2: 96% (12 Sep 2019 11:40) (94% - 97%)      PHYSICAL EXAM:  · CONSTITUTIONAL: Well-developed, elderly man, accompanied with daughter.  · NECK:	No bruits; no thyromegaly or nodules. No JVD  ·BACK:	+Kyphosis   ·RESPIRATORY:   airway patent; breath sounds equal; good air movement; respirations non-labored; clear to auscultation bilaterally; no chest wall tenderness; no intercostal retractions; no rales,rhonchi or wheeze  · CARDIOVASCULAR	+Grade 3 murmur best heard in RUSB. regular rate and rhythm. No rubs. No JVD.   . GASTROINTESTINAL:  no distention; no masses palpable; bowel sounds normal; no rebound tenderness; no guarding; no rigidity; no organomegaly  · EXTREMITIES: 2+ pitting edema to knees. Warm and well perfused. Symmetric. No cyanosis, clubbing.  · VASCULAR: 	Equal and normal pulses (carotid, femoral, dorsalis pedis)  ·NEUROLOGICAL:   alert and oriented x 3; moves all extremities on command. Hard of hearing  TELEMETRY:    ECG:    LABS:                        9.0    13.44 )-----------( 418      ( 12 Sep 2019 08:48 )             27.7     09-12    129<L>  |  92<L>  |  30<H>  ----------------------------<  148<H>  4.3   |  24  |  0.71    Ca    8.7      12 Sep 2019 06:49  Phos  3.2     09-12  Mg     2.0     09-12            Creatinine Trend: 0.71<--, 0.63<--, 0.71<--, 0.98<--, 0.70<--, 0.72<--  I&O's Summary    11 Sep 2019 07:01  -  12 Sep 2019 07:00  --------------------------------------------------------  IN: 1120 mL / OUT: 850 mL / NET: 270 mL    12 Sep 2019 07:01  -  12 Sep 2019 14:09  --------------------------------------------------------  IN: 600 mL / OUT: 200 mL / NET: 400 mL

## 2019-09-12 NOTE — PROGRESS NOTE ADULT - REASON FOR ADMISSION
Hyponatremia

## 2019-09-17 ENCOUNTER — RX RENEWAL (OUTPATIENT)
Age: 84
End: 2019-09-17

## 2019-09-17 RX ORDER — DENOSUMAB 60 MG/ML
60 INJECTION SUBCUTANEOUS
Qty: 1 | Refills: 2 | Status: ACTIVE | COMMUNITY
Start: 2019-05-10 | End: 1900-01-01

## 2019-10-01 PROCEDURE — 85027 COMPLETE CBC AUTOMATED: CPT

## 2019-10-01 PROCEDURE — 82533 TOTAL CORTISOL: CPT

## 2019-10-01 PROCEDURE — 82550 ASSAY OF CK (CPK): CPT

## 2019-10-01 PROCEDURE — 83540 ASSAY OF IRON: CPT

## 2019-10-01 PROCEDURE — 84443 ASSAY THYROID STIM HORMONE: CPT

## 2019-10-01 PROCEDURE — 96374 THER/PROPH/DIAG INJ IV PUSH: CPT

## 2019-10-01 PROCEDURE — 85730 THROMBOPLASTIN TIME PARTIAL: CPT

## 2019-10-01 PROCEDURE — 82962 GLUCOSE BLOOD TEST: CPT

## 2019-10-01 PROCEDURE — 83550 IRON BINDING TEST: CPT

## 2019-10-01 PROCEDURE — 85610 PROTHROMBIN TIME: CPT

## 2019-10-01 PROCEDURE — 97161 PT EVAL LOW COMPLEX 20 MIN: CPT

## 2019-10-01 PROCEDURE — 93005 ELECTROCARDIOGRAM TRACING: CPT | Mod: 76

## 2019-10-01 PROCEDURE — 80048 BASIC METABOLIC PNL TOTAL CA: CPT

## 2019-10-01 PROCEDURE — C8929: CPT

## 2019-10-01 PROCEDURE — 84100 ASSAY OF PHOSPHORUS: CPT

## 2019-10-01 PROCEDURE — 82728 ASSAY OF FERRITIN: CPT

## 2019-10-01 PROCEDURE — 83735 ASSAY OF MAGNESIUM: CPT

## 2019-10-01 PROCEDURE — 99285 EMERGENCY DEPT VISIT HI MDM: CPT | Mod: 25

## 2019-10-01 PROCEDURE — 83880 ASSAY OF NATRIURETIC PEPTIDE: CPT

## 2019-10-01 PROCEDURE — 83935 ASSAY OF URINE OSMOLALITY: CPT

## 2019-10-01 PROCEDURE — 83930 ASSAY OF BLOOD OSMOLALITY: CPT

## 2019-10-01 PROCEDURE — 82553 CREATINE MB FRACTION: CPT

## 2019-10-01 PROCEDURE — 80053 COMPREHEN METABOLIC PANEL: CPT

## 2019-10-01 PROCEDURE — 84300 ASSAY OF URINE SODIUM: CPT

## 2019-10-01 PROCEDURE — 84484 ASSAY OF TROPONIN QUANT: CPT

## 2019-10-01 PROCEDURE — 71045 X-RAY EXAM CHEST 1 VIEW: CPT

## 2019-11-18 NOTE — END OF VISIT
[FreeTextEntry3] : I, Joni Anderson MD, ordering physician, have read and attest that all the information, medical decision making and discharge instructions within are true and accurate.

## 2019-11-18 NOTE — HISTORY OF PRESENT ILLNESS
[de-identified] : s/p ORIF left intertrochanteric hip fracture on 08/21/2019.  [de-identified] : The patient is a 89 year male who returns for the 1st postoperative visit after undergoing ORIF left intertrochanteric hip fracture at [facility]. The patient was doing well until he fell on 08/20/2019. He presented to the ED c/o left hip pain at which point x-rays were taken and showed a left intertrochanteric fracture. He was advised surgery with ORIF. The surgery was on 08/21/2019. The patient is recovering at home where he has been receiving home therapy sessions. He reports mild postoperative pain. He is weight bearing with a walker but still walks with a limp. He presents today with his daughter. \par Additionally, the patient is also s/p ORIF right intertrochanteric fracture from several years ago.  [de-identified] : Patient is WDWN, alert, and in no acute distress. Breathing is unlabored. He is grossly oriented to person, place, and time. \par \par Left Hip: Incision is healed. There are no signs of infection. Sutures were previously removed. Normal amount of postoperative edema and tenderness present.  [de-identified] : AP view of the left hip and pelvis were obtained and revealed stabilization of the left hip intertrochanteric fracture with a short TFN in place. Fracture line is still visualized. \par  [de-identified] : Walking and physical activity were encouraged. Topical analgesics as needed. NSAIDs as tolerated. \par Follow up in 3 months.

## 2019-11-18 NOTE — ADDENDUM
[FreeTextEntry1] : I, Hannah Ace wrote this note acting as a scribe for Dr. Joni Anderson on Nov 18, 2019.

## 2019-11-25 NOTE — END OF VISIT
[FreeTextEntry3] : I, Diogo Lambert, authored this note working as a medical scribe for Dr. Chi.  11/22/2019. 10:30AM.  This note was authored by the medical scribe for me. I have reviewed, edited, and revised the note as needed. I am in agreement with the exam findings, imaging findings, and treatment plan.  Julian Chi MD

## 2019-11-25 NOTE — REVIEW OF SYSTEMS
[Recent Weight Gain (___ Lbs)] : recent [unfilled] ~Ulb weight gain [Lower Ext Edema] : lower extremity edema [Polyuria] : polyuria [Nocturia] : nocturia [Back Pain] : back pain [Swelling] : swelling [All other systems negative] : All other systems negative [Visual Field Defect] : no visual field defect [Blurry Vision] : no blurred vision [Dry Eyes] : no dryness of the eyes [Eyes Itch] : no itching of the eyes [Dysphagia] : no dysphagia [Dysphonia] : no dysphonia [Neck Pain] : no neck pain [Nasal Congestion] : no nasal congestion [Palpitations] : no palpitations [Leg Claudication] : no intermittent leg claudication [Chest Pain] : no chest pain [Wheezing] : no wheezing was heard [Cough] : no cough [Shortness Of Breath] : no shortness of breath [Nausea] : no nausea [SOB on Exertion] : no shortness of breath during exertion [Diarrhea] : no diarrhea [Constipation] : no constipation [Vomiting] : no vomiting was observed [Joint Stiffness] : no joint stiffness [Joint Pain] : no joint pain [Muscle Weakness] : no muscle weakness [Acanthosis] : no acanthosis  [Acne] : no acne [Hirsutism] : no hirsutism [Tremors] : no tremors [Headache] : no headaches [Dizziness] : no dizziness [Pain/Numbness of Digits] : no pain/numbness of digits [Depression] : no depression [Anxiety] : no anxiety [Polydipsia] : no polydipsia [Heat Intolerance] : heat tolerant [Lymphadenopathy] : no lymphadenopathy [Cold Intolerance] : cold tolerant [Easy Bleeding] : no ~M tendency for easy bleeding

## 2019-11-25 NOTE — PHYSICAL EXAM
[Alert] : alert [No Acute Distress] : no acute distress [No Proptosis] : no proptosis [No Oral Ulcers] : no oral ulcers [Thyroid Not Enlarged] : the thyroid was not enlarged [No Respiratory Distress] : no respiratory distress [Normal Rate and Effort] : normal respiratory rhythm and effort [No Accessory Muscle Use] : no accessory muscle use [Clear to Auscultation] : lungs were clear to auscultation bilaterally [Normal S1, S2] : normal S1 and S2 [Normal Bowel Sounds] : normal bowel sounds [Not Tender] : non-tender [Soft] : abdomen soft [Normal] : normal and non tender [Kyphosis] : kyphosis present [No Stigmata of Cushings Syndrome] : no stigmata of cushings syndrome [Oriented x3] : oriented to person, place, and time [Normal Affect] : the affect was normal [Normal Mood] : the mood was normal [de-identified] : Patient with posts right lower jaw, no signs of necrosis at this time. Hard of hearing, wearing aids [de-identified] : Elderly frail-appearing male [de-identified] : Ambulates with a walker [de-identified] : Severe kyphosis [de-identified] : bandage under left mandible clean, dry

## 2019-11-25 NOTE — ASSESSMENT
[Importance of Diet and Exercise] : importance of diet and exercise to improve glycemic control, achieve weight loss and improve cardiovascular health [FreeTextEntry1] : 89 y/o male with severe osteoporosis. \par \par Has h/o multiple vertebral fx in the past, and was treated with Reclast in 2014. He had further R clavicle fx on 5/5/16 and a R upper femur fx in November 2015 with a hip replacement by Dr Joni Anderson. \par Began Prolia  8/2016. Tolerating well. No thigh pain, no interval fx. No ONJ. BMD 9/2017 indicated potential decrease hip. Stable but very severely low density in other sites. Repeat BMD 11/2018 indicates stability in all sites, though severely low. BMD 11/2019 indicates osteopenia in spine which is trending towards improvement, proximal radius still consistent with severely low osteoporosis. Ca 8.8 normal. Continue Prolia, buy and bill.\par \par Of note, pt fell on 08/20/2019. He presented to the ED c/o left hip pain at which point x-rays were taken and showed a left intertrochanteric fracture. He was advised surgery with ORIF. The surgery was on 08/21/2019. The patient is recovering at home where he has been receiving home therapy sessions. Fx is slow to heal, pt has pain and had severely limited mobility. Ambulates in wheelchair.\par \par I discussed use aggressive bone drugs, specifically Tymlos for 6 months, which has strong evidence in healing slow to heal fractures. Risks and benefits discussed including blood Ca elevation and/or lightheadedness. In animal models long term use has shown increased risk for bone CA, though never seen in humans. Pt would be on this treatment for <2 years followed by another osteoporosis rx to prevent rapid bone loss. Pt can transition to bisphosphonate therapy or Prolia  after taking Tymlos or Forteo after <2 years. Recommend the pt ask Dr. Anderson about using this rx.\par I also discussed monthly Evenity (romosozumab), an anti-sclersotin antibody given for 1 year. Pt would have to transition to another osteoporosis rx such has Prolia or bisphosphonate therapy after 1 year to avoid long term risks. Risks and benefits discussed including possible cardiovascular issues.\par \par f/u in 6 months

## 2019-11-25 NOTE — PROCEDURE
[FreeTextEntry1] : Bone mineral density: 11/22/2019 \par Indication: vs. 2018\par Spine: (L1-L2) -2.4 osteopenia +20.0% strongly suspect artifact\par Total hip: not performed\par Femoral neck: not performed\par Proximal radius: -7.7 osteoporosis, no significant change\par \par Bone mineral density: 11/01/2018 \par Indication: vs. 2017, assess response to medication on androgen deprivation\par Spine: (L1-L2) -3.6 osteoporosis, no significant change \par Total hip: -3.9 osteoporosis, no significant change \par Femoral neck: -4.3 osteoporosis, no significant change \par Proximal radius: -7.8 osteoporosis, no significant change \par \par BMD 9/6/17\par Lumbar spine 1-2: -3.5 osteoporosis, -3.6%\par Total hip: -4.1 osteoporosis, -7.0% \par Femoral neck: -4.4  osteoporosis, no significant change  \par Proximal wrist: -7.7  osteoporosis, +3.2% not significant  \par \par BMD July 2016-  see note

## 2019-11-25 NOTE — HISTORY OF PRESENT ILLNESS
[Zoledronic Acid (Zometa)] : Zoledronic Acid [Patient taking Meds Correctly] : Patient is taking meds correctly [Prolia (Denosumab)] : Prolia (Denosumab) [FreeTextEntry1] : 89 year old male with severe osteoporosis \par \par Long h/o osteoporosis and has sustained multiple fx, including four rib fx and 2 vertebral compression fx in late October 2013. He was noted to have 2 more old vertebral fx. Prior shoulder fx which required arthroplasty/prosthesis. \par He had been on Zometa for about 4 years but that was discontinued > 5 years ago upon pt's request because of concern for ONJ.  Pt had a R clavicle fx on 5/5/16 and a R upper femur fx in November 2015 with a hip replacement by Dr Joni Anderson. \par Started Prolia 2016. Slightly late f/u in 2017. Tolerating well. No thigh pain, no interval fx. No ONJ.\par \par Pt fell on 08/20/2019. He presented to the ED c/o left hip pain at which point x-rays were taken and showed a left intertrochanteric fracture. He was advised surgery with ORIF. The surgery was on 08/21/2019. The patient is recovering at home where he has been receiving home therapy sessions. Ambulates in wheelchair.\par \par Pt is on Androgen Deprivation Therapy for over 20 years for prostate CA. Increasing PSA in May 2018, followed at [facility]. Pt was taken off of Xtandi and this number decreased. He is still on Lupron. \par Pt had an episode of Sodium below 134. He was admitted at [facility] August 2018.
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
## 2019-11-27 PROBLEM — L40.9 PSORIASIS: Status: ACTIVE | Noted: 2018-12-24

## 2019-11-27 NOTE — END OF VISIT
[] : Resident [FreeTextEntry3] : 89 yo man, retired  from [employer] brought in by daughter Luna (HCP, [phone]) for follow up and comprehensive reassessment after his fall in April 2019 and ORIF. He was recently discharged from [facility], placed on Lasix and Alprazolam for anxiety. He is now at home with HHA 9 hours/ M-F.  he has been followed at [facility] for prostate Ca and PSA is on the rise. Family unsure how to proceed.\par On PE he is a frail and kyphotic man in no distress. His skin is intact. Lungs clear, minimal edema left ankle (Left femur ORIF)\par Plan: D/C Alprazolam and Lasix. Refer to  to discuss options for long term care. For the time being, home with family and HHA

## 2019-11-27 NOTE — ASSESSMENT
[FreeTextEntry1] : 89 y/o M [place] and retired  from [employer] presents w/ daughter Luna for follow up\par \par Left Hip Fracture\par - F/u orthopedics, outpatient PT, Tylenol PRN for pain control, and advising to stop taking Xanaz\par \par MIld Cognitive Impairment\par - MMSE 11/27/2019 score of 25/30 w/ normal CDT, impairment w/ recall\par \par HTN\par - Patient on Amlodipine/Benazepril; BP low in clinic, advised to stop taking Lasix for now and monitor outpatinetly\par \par Lower extremity swelling\par - Minimal and expected swelling s/p ORIF L hip; Advised to stop taking Lasix\par \par Prostate CA\par - PSA currently rising 120s, on Lupron shots, planning for follow up w/ Oncology w/ plan for further imaging to characterize grading\par \par Osteoporosis\par - On Prolia shot w/ plan for possible Tymolos shot x 6 months but pending Oncology\par \par Urinary incontinence\par - Worsening urinary incontinence leading to skin breakdown at groin; patient taking Lasix at evening, advised daughter to switch to morning\par \par HLD\par - On Atorvastatin 20 mg; given his age and no Hx of CAD, CVA, or vascular Sx unclear indication for continued use. Can consider discontinuing as per Cardiology

## 2019-11-27 NOTE — HISTORY OF PRESENT ILLNESS
[None] : The patient is currently asymptomatic [0] : 2) Feeling down, depressed, or hopeless: Not at all [Mild] : Stage: Mild [Stable] : Status: Stable [FreeTextEntry1] : 89 y/o M [place] and retired  from [employer] presents w/ daughter Luna (HCP, [phone]) for follow up - since April patient had a fall s/p ORIF and BROOKE c/b lower extremity swelling and placed on Lasix and SIADH requiring hospitalizations/BROOKE. Right now he lives at home w/ his family and HHA for 9 hours x 5 days. Patient has begun Xanax 2/2 anxiety about falling. Labs drawn at [facility] week prior showing Na 138 and K 5.3.\par \par Left Hip Fracture\par - 2/2 mechanical slip and fall and following orthopedics s/p ORIF and BROOKE, currently ambulating w/ walker \par - Pain control w/ Tylenol\par - Xanax for anxiety; on daily, prescribed at [facility], advising to stop taking.\par \par MIld Cognitive Impairment\par - Prior MMSE 27/30; no changes in cognitive function as per daughter; repeat 25/30\par \par HTN\par - Patient on Amlodipine/Benazepril; BP low normal, asymptomatic, but prior vitals noted 140s/80s\par \par Lower extremity swelling\par - Improved, 1+ LLE and minimal RLE; possibly 2/2 CCB, was begun on Lasix\par \par Prostate CA\par - PSA currently rising 120s, on Lupron shots, planning for follow up w/ Oncology w/ plan for further imaging to characterize grading\par \par Osteoporosis\par - On Prolia shot w/ plan for possible Tymolos shot x 6 months but pending Oncology\par \par Urinary incontinence\par - Worsening urinary incontinence leading to skin breakdown at groin; patient taking Lasix at evening, advised daughter to switch to morning\par \par HLD\par - On Atorvastatin 20 mg; given his age and no Hx of CAD, CVA, or vascular Sx unclear indication for continued use. \par \par HCM\par - Independent w/ feeding and cleaning but requires assistance w/ cooking, finances, and cleaning\par - S/p Influenza vaccine Oct. 2019\par - Daughter reports receiving Shingrix vaccine given his Hx of infection 10 years ago

## 2019-11-27 NOTE — PHYSICAL EXAM
[General Appearance - In No Acute Distress] : in no acute distress [Extraocular Movements] : extraocular movements were intact [Normal Oral Mucosa] : normal oral mucosa [Jugular Venous Distention Increased] : there was no jugular-venous distention [Auscultation Breath Sounds / Voice Sounds] : lungs were clear to auscultation bilaterally [Heart Sounds] : normal S1 and S2 [Abdomen Soft] : soft [Testes Swelling] : the testicles were not swollen [Cervical Lymph Nodes Enlarged Anterior Bilaterally] : anterior cervical [Musculoskeletal - Swelling] : no joint swelling seen [Skin Color & Pigmentation] : normal skin color and pigmentation [] : no rash [Skin Lesions] : no skin lesions [Oriented To Time, Place, And Person] : oriented to person, place, and time [Affect] : the affect was normal [Mood] : the mood was normal [FreeTextEntry1] : 1+ Edema LLE, severe kyphosis

## 2020-01-01 ENCOUNTER — APPOINTMENT (OUTPATIENT)
Dept: GERIATRICS | Facility: CLINIC | Age: 85
End: 2020-01-01
Payer: MEDICARE

## 2020-01-01 ENCOUNTER — APPOINTMENT (OUTPATIENT)
Dept: ENDOCRINOLOGY | Facility: CLINIC | Age: 85
End: 2020-01-01

## 2020-01-01 ENCOUNTER — TRANSCRIPTION ENCOUNTER (OUTPATIENT)
Age: 85
End: 2020-01-01

## 2020-01-01 ENCOUNTER — APPOINTMENT (OUTPATIENT)
Dept: ORTHOPEDIC SURGERY | Facility: CLINIC | Age: 85
End: 2020-01-01
Payer: MEDICARE

## 2020-01-01 ENCOUNTER — APPOINTMENT (OUTPATIENT)
Dept: GERIATRICS | Facility: CLINIC | Age: 85
End: 2020-01-01

## 2020-01-01 ENCOUNTER — APPOINTMENT (OUTPATIENT)
Dept: ENDOCRINOLOGY | Facility: CLINIC | Age: 85
End: 2020-01-01
Payer: MEDICARE

## 2020-01-01 VITALS
OXYGEN SATURATION: 98 % | TEMPERATURE: 98 F | RESPIRATION RATE: 15 BRPM | SYSTOLIC BLOOD PRESSURE: 90 MMHG | HEIGHT: 60 IN | HEART RATE: 83 BPM | DIASTOLIC BLOOD PRESSURE: 60 MMHG

## 2020-01-01 DIAGNOSIS — M81.0 AGE-RELATED OSTEOPOROSIS W/OUT CURRENT PATHOLOGICAL FRACTURE: ICD-10-CM

## 2020-01-01 DIAGNOSIS — L57.0 ACTINIC KERATOSIS: ICD-10-CM

## 2020-01-01 DIAGNOSIS — R26.81 UNSTEADINESS ON FEET: ICD-10-CM

## 2020-01-01 DIAGNOSIS — Z79.818 LONG TERM (CURRENT) USE OF OTHER AGENTS AFFECTING ESTROGEN RECEPTORS AND ESTROGEN LEVELS: ICD-10-CM

## 2020-01-01 DIAGNOSIS — E78.5 HYPERLIPIDEMIA, UNSPECIFIED: ICD-10-CM

## 2020-01-01 DIAGNOSIS — L98.9 DISORDER OF THE SKIN AND SUBCUTANEOUS TISSUE, UNSPECIFIED: ICD-10-CM

## 2020-01-01 DIAGNOSIS — I10 ESSENTIAL (PRIMARY) HYPERTENSION: ICD-10-CM

## 2020-01-01 DIAGNOSIS — S72.142D DISPLACED INTERTROCHANTERIC FRACTURE OF LEFT FEMUR, SUBSEQUENT ENCOUNTER FOR CLOSED FRACTURE WITH ROUTINE HEALING: ICD-10-CM

## 2020-01-01 DIAGNOSIS — C61 MALIGNANT NEOPLASM OF PROSTATE: ICD-10-CM

## 2020-01-01 LAB
25(OH)D3 SERPL-MCNC: 65.5 NG/ML
ALBUMIN SERPL ELPH-MCNC: 4.1 G/DL
ALP BLD-CCNC: 170 U/L
ALT SERPL-CCNC: 17 U/L
ANION GAP SERPL CALC-SCNC: 12 MMOL/L
AST SERPL-CCNC: 24 U/L
BILIRUB SERPL-MCNC: 0.4 MG/DL
BUN SERPL-MCNC: 18 MG/DL
CALCIUM SERPL-MCNC: 9.6 MG/DL
CHLORIDE SERPL-SCNC: 102 MMOL/L
CO2 SERPL-SCNC: 24 MMOL/L
CREAT SERPL-MCNC: 0.94 MG/DL
GLUCOSE SERPL-MCNC: 113 MG/DL
POTASSIUM SERPL-SCNC: 4.6 MMOL/L
PROT SERPL-MCNC: 6 G/DL
SODIUM SERPL-SCNC: 138 MMOL/L

## 2020-01-01 PROCEDURE — 73502 X-RAY EXAM HIP UNI 2-3 VIEWS: CPT | Mod: LT

## 2020-01-01 PROCEDURE — 99213 OFFICE O/P EST LOW 20 MIN: CPT

## 2020-01-01 PROCEDURE — 96372 THER/PROPH/DIAG INJ SC/IM: CPT

## 2020-01-01 PROCEDURE — 99215 OFFICE O/P EST HI 40 MIN: CPT | Mod: GC

## 2020-01-01 RX ORDER — DENOSUMAB 60 MG/ML
60 INJECTION SUBCUTANEOUS
Qty: 1 | Refills: 0 | Status: COMPLETED | OUTPATIENT
Start: 2020-01-01

## 2020-01-01 RX ORDER — ALPRAZOLAM 0.25 MG/1
0.25 TABLET ORAL DAILY
Refills: 0 | Status: DISCONTINUED | COMMUNITY
Start: 2019-01-01 | End: 2020-01-01

## 2020-01-01 RX ORDER — FUROSEMIDE 20 MG/1
20 TABLET ORAL DAILY
Refills: 0 | Status: DISCONTINUED | COMMUNITY
Start: 2019-01-01 | End: 2020-01-01

## 2020-01-24 PROBLEM — L57.0 ACTINIC KERATOSES: Status: ACTIVE | Noted: 2019-03-22

## 2020-01-24 PROBLEM — L98.9 SKIN LESION: Status: ACTIVE | Noted: 2020-01-01

## 2020-01-24 PROBLEM — Z79.818 ANDROGEN DEPRIVATION THERAPY: Status: ACTIVE | Noted: 2017-08-31

## 2020-01-24 PROBLEM — R26.81 UNSTEADY GAIT: Status: ACTIVE | Noted: 2019-01-01

## 2020-01-24 NOTE — END OF VISIT
[] : Fellow [FreeTextEntry3] : 89 yo elderly man, retired  from [employer]   brought in by his devoted daughter Luna (HCP, [phone]) to check his chronic buttocks rash. Patient lives with his wife has Alzheimer's disease and two of his 3 children.PMH: fall , s/p Left Hip June 2019, s/p BROOKE discharged home on 11/2019. Has HHA for 9 hours x 6 days, ambulates with walker, no falls. Prior MMSE 27/30, repeated was 25/30\par On PE : bearded man, kyphotic, in no acute distress, exam is wnl, no tremors, no focal findings. Has MASD, skin is intact, red around bilateral buttocks and anal area, as well as scrotal area. no odor, no discharge\par Plan: Discuss with daughter patient's hygiene, currently only showered once a week. Encouraged to use daily shower with shower bench and 2 assist. Increase use and frequency of zinc oxide applications, and expand to scrotum as well.\par No medications change

## 2020-01-24 NOTE — HISTORY OF PRESENT ILLNESS
[FreeTextEntry1] : 89 y/o elderly man presented to the clinic for follow up appointment and evaluation his rash on his buttocks. Patient was accompanied by his daughter Luna (HCP, [phone])\par \par Patient social situation:\par - Patient is retired  from [employer]\par - he is , his wife has Alzheimer's disease \par - patient has 3 children, 2 lives with him in the same house\par - his  daughter Luan is his HCP ( [phone])\par \par H/o fall 2/2 mechanical slip and fall/Left Hip Fracture:\par - patient fell 6/2019, had L hip fracture\par - s/p ORIF and BROOKE \par - returned home 11/2019\par - he lives at home w/ his family and HHA for 9 hours x 6 days\par - currently ambulating w/ walker \par \par MIld Cognitive Impairment\par - Prior MMSE 27/30, repeated was 25/30\par - no changes in cognitive function as per daughter\par \par HTN\par - controlled Amlodipine/Benazepril\par - BP low normal\par - asymptomatic\par \par Lower extremity swelling\par - Improved, off Lasix now\par \par Prostate CA\par - on Lupron shots \par - follows with Oncology\par - had bone scan within past 2 months\par \par Osteoporosis\par - On Prolia shot w/ plan for possible Tymolos shot x 6 months \par - follows with endocrinologist \par - had bone density done within past 6 months\par \par Urinary incontinence/skin breakdown\par - Worsening urinary incontinence leading to skin breakdown at groin\par \par HLD\par - On Atorvastatin 20 mg\par - given his age and no Hx of CAD, CVA, or vascular Sx unclear indication for continued use. \par \par HCM\par - Independent w/ feeding and cleaning \par - needs assistance with bathing, cooking, cleaning, finances \par - S/p Influenza vaccine Oct. 2019\par - up-to date on Shingrex and Pneumonia vaccine

## 2020-01-24 NOTE — PHYSICAL EXAM
[General Appearance - Alert] : alert [General Appearance - Well Nourished] : well nourished [Sclera] : the sclera and conjunctiva were normal [Extraocular Movements] : extraocular movements were intact [Normal Oral Mucosa] : normal oral mucosa [No Oral Pallor] : no oral pallor [Neck Appearance] : the appearance of the neck was normal [Neck Cervical Mass (___cm)] : no neck mass was observed [Jugular Venous Distention Increased] : there was no jugular-venous distention [] : no respiratory distress [Apical Impulse] : the apical impulse was normal [Exaggerated Use Of Accessory Muscles For Inspiration] : no accessory muscle use [Heart Sounds] : normal S1 and S2 [Heart Sounds Gallop] : no gallops [Bowel Sounds] : normal bowel sounds [Abdomen Soft] : soft [Abdomen Tenderness] : non-tender [No CVA Tenderness] : no ~M costovertebral angle tenderness [No Spinal Tenderness] : no spinal tenderness [Abnormal Walk] : normal gait [Nail Clubbing] : no clubbing  or cyanosis of the fingernails [Skin Color & Pigmentation] : normal skin color and pigmentation [FreeTextEntry1] : skin redness

## 2020-01-24 NOTE — REVIEW OF SYSTEMS
[Incontinence] : incontinence [Nocturia] : nocturia [Negative] : Heme/Lymph [Dysuria] : no dysuria [Joint Stiffness] : no joint stiffness [Joint Swelling] : no joint swelling [Itching] : no itching [Dizziness] : no dizziness [Fainting] : no fainting [Depression] : no depression [Anxiety] : no anxiety [FreeTextEntry4] : decreased hearing bilaterally  [FreeTextEntry9] : occasional hips pain [de-identified] : has skin redness on his, no open skin

## 2020-02-27 PROBLEM — S72.142D CLOSED DISPLACED INTERTROCHANTERIC FRACTURE OF LEFT FEMUR WITH ROUTINE HEALING, SUBSEQUENT ENCOUNTER: Status: ACTIVE | Noted: 2019-01-01

## 2020-02-28 NOTE — PHYSICAL EXAM
[de-identified] : Patient is WDWN, alert, and in no acute distress. Breathing is unlabored. He is grossly oriented to person, place, and time. \par \par Left Hip: Incision is healed. There are no signs of infection. Sutures were previously removed. Normal amount of postoperative edema and tenderness present.  [de-identified] : AP view of the left hip and pelvis were obtained and revealed stabilization of the left hip intertrochanteric fracture with a short TFN in place. Fracture line is still visualized. \par Additionally, right intertrochanteric fracture with long TFN is in place. \par \par

## 2020-02-28 NOTE — ADDENDUM
[FreeTextEntry1] : I, Hannah Ace wrote this note acting as a scribe for Dr. Joni Anderson on Feb 27, 2020.

## 2020-02-28 NOTE — HISTORY OF PRESENT ILLNESS
[de-identified] : The patient is a 89 year male who returns for a followup visit after undergoing ORIF left intertrochanteric hip fracture at [facility]. The patient was doing well until he fell on 08/20/2019. He presented to the ED c/o left hip pain at which point x-rays were taken and showed a left intertrochanteric fracture. He was advised surgery with ORIF. The surgery was on 08/21/2019. The patient recovered at home where he received home therapy sessions. He presents today at the 6 month lalo. He reports mild postoperative pain but it is within tolerable limits as he does not take any pain medication. He is weight bearing with a walker but still has a slight limp. He presents today with his daughter. \par Additionally, the patient is also s/p ORIF right intertrochanteric fracture from several years ago.

## 2020-02-28 NOTE — DISCUSSION/SUMMARY
[de-identified] : Walking and physical activity were encouraged. \par Topical analgesics as needed.\par  NSAIDs as tolerated. \par Follow up in 3 months. \par

## 2020-03-25 NOTE — CHART NOTE - NSCHARTNOTEFT_GEN_A_CORE
Note:    spoke w/ Daughter (@ bedside) and patient @ length.   Pt's Rpt h/H @ 4pm p receiving 1 Unit prbc's for acute blood loss / post op anemia 2/2 surgery:         9/26.4  V/S 117/76 -84R 16 02 sat 99% (2L/M)    Daughter states pt's mentation / appetite / overall appearance improved since transfusion  Participated in PT today this AM: OOB-> chair  Risks and benefits of 2nd Txn d/w daughter and patient    Plan:  -Will hold off on 2nd unit  - Recheck labs in am  - If pt becomes symptomatic later this evening will reconsider transfusion  - All questions and issues addressed      ***See Above  Elias [facility]AT[facility]  Orthopedics  B: [phone]  S: [phone]
Patient's daughter spoke with outpatient Oncologist office today to discuss when next Lupron injection will be required.  Patient will be going to Subacute Rehab today, Oncologist office and Daughter in agreement that Lupron injections for Prostate Ca will be deferred until after discharge from Subacute Rehab.  Patient will follow up with Oncologist after discharge from Rehab.    MORTEZA Lopez PA-C  #[id number]
TYLER (acute kidney injury)

## 2020-07-27 NOTE — ED PROVIDER NOTE - INTERPRETATION
Pt presents to ER via EMS from work for c/o weakness and syncopal episode pta. Pt states that yesterday he woke up and didn't feel well, N/V/D and general fatigue. Today pt states he was still feeling lightheaded and was sitting at work and \"blacked out\" falling onto the floor. Pt also reports that he has had generalized muscle soreness all of the time, since the age of 10 and states he has been meaning to go the doctor about this. Pt masked at arrival.   PPE worn by writer: gloves, gown, face shield/goggles and level 3 procedure mask  
normal sinus rhythm

## 2021-01-09 NOTE — PATIENT PROFILE ADULT. - MEDICATION ADMINISTRATION INFO, PROFILE
Bed: 23  Expected date:   Expected time:   Means of arrival:   Comments:  Triage low O2 sats   no concerns

## 2021-01-14 ENCOUNTER — APPOINTMENT (OUTPATIENT)
Dept: ENDOCRINOLOGY | Facility: CLINIC | Age: 86
End: 2021-01-14

## 2021-05-28 NOTE — PROGRESS NOTE ADULT - SUBJECTIVE AND OBJECTIVE BOX
"Chief Complaint   Patient presents with     RECHECK     Patient here today for follow up     /83 (BP Location: Right arm, Patient Position: Sitting, Cuff Size: Adult Regular)   Pulse 108   Temp 99.3  F (37.4  C) (Oral)   Resp 20   Ht 1.487 m (4' 10.54\")   Wt 50.6 kg (111 lb 8.8 oz)   SpO2 98%   BMI 22.88 kg/m      No Pain (0)  Data Unavailable    I have reviewed the patients medication and allergy list.    Patient needs refills: no    Dressing change needed? No    EKG needed? No    Marylin Lynne CMA  May 28, 2021  " SUBJ:  ***Additional HPI obtained from daughter: Complains of worsening exercise intolerance for past 4-5 years. 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 Has had multiple falls and bone fractures in last several years but no reported episodes of syncope. Also has worsening lethargia in past few years. He also endorses paroxysmal nocturnal dyspnea and orthopnea for last several years  Has never been told about he has a heart murmur.    This AM: No headache, increasing confusion, somnolence. No chest pain, palpitations, SOB.      MEDICATIONS  (STANDING):  enoxaparin Injectable 40 milliGRAM(s) SubCutaneous daily    MEDICATIONS  (PRN):    Vital Signs Last 24 Hrs  T(C): 37 (08 Sep 2019 09:48), Max: 37 (08 Sep 2019 09:48)  T(F): 98.6 (08 Sep 2019 09:48), Max: 98.6 (08 Sep 2019 09:48)  HR: 72 (08 Sep 2019 09:48) (72 - 95)  BP: 120/70 (08 Sep 2019 09:48) (97/59 - 135/61)  BP(mean): 77 (07 Sep 2019 21:43) (77 - 77)  RR: 17 (08 Sep 2019 09:48) (16 - 21)  SpO2: 97% (08 Sep 2019 09:48) (95% - 100%)    PHYSICAL EXAM:  · CONSTITUTIONAL: Well-developed, elderly man, accompanied with daughter.  · EYES:	EOMI; PERRL; no drainage or redness  · ENMT	No oral lesions; no gross abnormalities  · NECK:	No bruits; no thyromegaly or nodules  ·BACK:	+Kyphosis   ·RESPIRATORY:   airway patent; breath sounds equal; good air movement; respirations non-labored; clear to auscultation bilaterally; no chest wall tenderness; no intercostal retractions; no rales,rhonchi or wheeze  · CARDIOVASCULAR	+Grade 3 murmur best heard in RUSB. regular rate and rhythm. No rubs. No JVD.   . GASTROINTESTINAL:  no distention; no masses palpable; bowel sounds normal; no rebound tenderness; no guarding; no rigidity; no organomegaly  · EXTREMITIES: 3+ pitting edema to knees. Warm and well perfused. Symmetric. No cyanosis, clubbing.  · VASCULAR: 	Equal and normal pulses (carotid, femoral, dorsalis pedis)  ·NEUROLOGICAL:   alert and oriented x 3; sensation intact; deep reflexes intact; cranial nerves intact; no spontaneous movement; superficial reflexes intact; normal strength  · SKIN:	No lesions; no rash  . LYMPH NODES:	No lymphadedenopathy  · MUSCULOSKELETAL:   No calf tenderness  no joint swelling	    LABS:                        9.9    13.7  )-----------( 513      ( 07 Sep 2019 18:01 )             30.4     09-08    115<LL>  |  82<L>  |  16  ----------------------------<  95  4.7   |  21<L>  |  0.64    Ca    8.2<L>      08 Sep 2019 03:39  Mg     1.9     09-07    TPro  6.0  /  Alb  3.4  /  TBili  0.9  /  DBili  x   /  AST  23  /  ALT  23  /  AlkPhos  107  09-07    CARDIAC MARKERS ( 07 Sep 2019 18:01 )  x     / x     / 64 U/L / x     / 4.8 ng/mL      PT/INR - ( 07 Sep 2019 18:01 )   PT: 12.2 sec;   INR: 1.07 ratio         PTT - ( 07 Sep 2019 18:01 )  PTT:28.2 sec  Creatinine Trend: 0.64<--, 0.60<--, 0.62<--, 0.67<--, 0.87<--, 0.97<--  I&O's Summary    07 Sep 2019 07:01  -  08 Sep 2019 07:00  --------------------------------------------------------  IN: 120 mL / OUT: 600 mL / NET: -480 mL    08 Sep 2019 07:01  -  08 Sep 2019 09:57  --------------------------------------------------------  IN: 0 mL / OUT: 750 mL / NET: -750 mL      BNPSerum Pro-Brain Natriuretic Peptide: 18932 pg/mL (09-07 @ 18:01)    RADIOLOGY & ADDITIONAL STUDIES:    IMPRESSION AND PLAN:

## 2021-09-07 NOTE — H&P ADULT - I WAS PHYSICALLY PRESENT FOR THE KEY PORTIONS OF THE EVALUATION AND MANAGEMENT (E/M) SERVICE PROVIDED.  I AGREE WITH THE ABOVE HISTORY, PHYSICAL, AND PLAN WHICH I HAVE REVIEWED AND EDITED WHERE APPROPRIATE
Quality 431: Preventive Care And Screening: Unhealthy Alcohol Use - Screening: Patient not identified as an unhealthy alcohol user when screened for unhealthy alcohol use using a systematic screening method Quality 47: Advance Care Plan: Advance Care Planning discussed and documented in the medical record; patient did not wish or was not able to name a surrogate decision maker or provide an advance care plan. Quality 226: Preventive Care And Screening: Tobacco Use: Screening And Cessation Intervention: Patient screened for tobacco use and is an ex/non-smoker Detail Level: Detailed Quality 130: Documentation Of Current Medications In The Medical Record: Current Medications Documented Statement Selected

## 2022-10-05 NOTE — PHYSICAL THERAPY INITIAL EVALUATION ADULT - BED MOBILITY TRAINING, PT EVAL
24 hour urine collection initiated 10/5 at 1045 following guerra insertion. Collection container with ice and appropriate signage present in patient room.      GOAL: Pt will perform bed mobility with min A in 4 weeks.

## 2023-05-23 NOTE — ED CDU PROVIDER INITIAL DAY NOTE - MUSCULOSKELETAL, MLM
"ACUPUNCTURIST TREATMENT NOTE    Name: Mayela Espino  :  1932  MRN:  [id number]    Acupuncture Treatment  Patient Type: Medical  Intervention Reason: Pain  Pain Location: (R) back  Pre-session Pain Ratin  Post-session Pain Ratin  Patient complaint:: (R) back pain  Initial insertions: Yin naidu, (R) Si 6, (L) Si 3, (L) Ling gu, (R) Bl 60, 62, 63, 65  Number of needles inserted: 8  Number of needles removed: 8         \"Risks and benefits of acupuncture were discussed with patient. Consent for treatment was given. We thank you for the referral.\"     Shubham Iniguez    Date:  2023  Time:  12:02 PM    " Spine appears normal, range of motion is not limited, no muscle or joint tenderness

## 2023-05-24 NOTE — ED PROVIDER NOTE - CHIEF COMPLAINT
The patient is a 89y Male complaining of Purse String (Intermediate) Text: Given the location of the defect and the characteristics of the surrounding skin a purse string intermediate closure was deemed most appropriate.  Undermining was performed circumfirentially around the surgical defect.  A purse string suture was then placed and tightened.

## 2024-11-05 NOTE — PROGRESS NOTE ADULT - ASSESSMENT
88 yo male h/o prstate ca, htn, chol, a/w left submandibular cellulitis with abscess    s/p I&D by ENT  wound care  ent f/u  f/u cult and sens  abx as per id  plan dc on oral clinda in AM  I  pain control  hydration    cont home meds    dvt ppx Detail Level: Zone

## 2025-02-13 NOTE — ED CDU PROVIDER INITIAL DAY NOTE - MOUTH NORMAL
Post-Op Assessment Note    CV Status:  Stable         Mental Status:  Alert   PONV Controlled:  Controlled   Airway Patency:  Patent     Post Op Vitals Reviewed: Yes    No anethesia notable event occurred.    Staff: CRNA           Last Filed PACU Vitals:  Vitals Value Taken Time   Temp     Pulse 61    /54    Resp 20    SpO2 100                normal mucosa